# Patient Record
Sex: MALE | Race: WHITE | Employment: OTHER | RURAL
[De-identification: names, ages, dates, MRNs, and addresses within clinical notes are randomized per-mention and may not be internally consistent; named-entity substitution may affect disease eponyms.]

---

## 2017-03-09 DIAGNOSIS — M15.9 GENERALIZED OSTEOARTHRITIS OF MULTIPLE SITES: ICD-10-CM

## 2017-03-09 RX ORDER — DICLOFENAC SODIUM 75 MG/1
TABLET, DELAYED RELEASE ORAL
Qty: 60 TAB | Refills: 3 | Status: SHIPPED | OUTPATIENT
Start: 2017-03-09 | End: 2017-07-25 | Stop reason: SDUPTHER

## 2017-04-10 DIAGNOSIS — K21.9 GASTROESOPHAGEAL REFLUX DISEASE WITHOUT ESOPHAGITIS: ICD-10-CM

## 2017-04-10 RX ORDER — FAMOTIDINE 40 MG/1
40 TABLET, FILM COATED ORAL DAILY
Qty: 30 TAB | Refills: 5 | Status: ON HOLD | OUTPATIENT
Start: 2017-04-10 | End: 2017-10-11 | Stop reason: SDUPTHER

## 2017-07-25 ENCOUNTER — OFFICE VISIT (OUTPATIENT)
Dept: FAMILY MEDICINE CLINIC | Age: 72
End: 2017-07-25

## 2017-07-25 VITALS
BODY MASS INDEX: 33.13 KG/M2 | RESPIRATION RATE: 18 BRPM | HEIGHT: 68 IN | OXYGEN SATURATION: 96 % | HEART RATE: 84 BPM | WEIGHT: 218.6 LBS | DIASTOLIC BLOOD PRESSURE: 99 MMHG | SYSTOLIC BLOOD PRESSURE: 157 MMHG

## 2017-07-25 DIAGNOSIS — Z13.1 DIABETES MELLITUS SCREENING: ICD-10-CM

## 2017-07-25 DIAGNOSIS — M15.9 GENERALIZED OSTEOARTHRITIS OF MULTIPLE SITES: ICD-10-CM

## 2017-07-25 DIAGNOSIS — R06.09 DYSPNEA ON EXERTION: Primary | ICD-10-CM

## 2017-07-25 DIAGNOSIS — R03.0 ELEVATED BLOOD PRESSURE READING WITHOUT DIAGNOSIS OF HYPERTENSION: ICD-10-CM

## 2017-07-25 DIAGNOSIS — R07.89 ANTERIOR CHEST WALL PAIN: ICD-10-CM

## 2017-07-25 RX ORDER — DICLOFENAC SODIUM 75 MG/1
TABLET, DELAYED RELEASE ORAL
Qty: 60 TAB | Refills: 5 | Status: SHIPPED | OUTPATIENT
Start: 2017-07-25 | End: 2018-05-30 | Stop reason: SDUPTHER

## 2017-07-25 NOTE — MR AVS SNAPSHOT
Visit Information Date & Time Provider Department Dept. Phone Encounter #  
 7/25/2017  7:00 AM Gemini Nunez MD [facility] Primary [facility] [phone] [id number] Upcoming Health Maintenance Date Due Hepatitis C Screening 1945 DTaP/Tdap/Td series (1 - Tdap) 3/31/1967 FOBT Q 1 YEAR AGE 50-75 3/31/1996 ZOSTER VACCINE AGE 60> 1/31/2006 GLAUCOMA SCREENING Q2Y 3/31/2011 Pneumococcal 65+ Low/Medium Risk (1 of 2 - PCV13) 3/31/2011 INFLUENZA AGE 9 TO ADULT 8/1/2017 MEDICARE YEARLY EXAM 7/22/2018 Allergies as of 7/25/2017  Review Complete On: 7/25/2017 By: Gemini Nunez MD  
  
 Severity Noted Reaction Type Reactions Pcn [Penicillins]  12/23/2013    Unknown (comments) Current Immunizations  Never Reviewed No immunizations on file. Not reviewed this visit You Were Diagnosed With   
  
 Codes Comments Dyspnea on exertion    -  Primary ICD-10-CM: R06.09 
ICD-9-CM: 786.09 Generalized osteoarthritis of multiple sites     ICD-10-CM: M15.9 ICD-9-CM: 715.09 Anterior chest wall pain     ICD-10-CM: R07.89 ICD-9-CM: 786.52 Elevated blood pressure reading without diagnosis of hypertension     ICD-10-CM: R03.0 ICD-9-CM: 796.2 Diabetes mellitus screening     ICD-10-CM: Z13.1 ICD-9-CM: V77.1 Vitals BP Pulse Resp Height(growth percentile) Weight(growth percentile) SpO2  
 (!) 157/99 (BP 1 Location: Left arm, BP Patient Position: Sitting) 84 18 5' 8\" (1.727 m) 218 lb 9.6 oz (99.2 kg) 96% BMI Smoking Status 33.24 kg/m2 Former Smoker Vitals History BMI and BSA Data Body Mass Index Body Surface Area  
 33.24 kg/m 2 2.18 m 2 Preferred Pharmacy Pharmacy Name Phone [id number] [address] [place] [phone] Your Updated Medication List  
  
   
This list is accurate as of: 7/25/17  7:58 AM.  Always use your most recent med list.  
  
  
  
  
 diclofenac EC 75 mg EC tablet Commonly known as:  VOLTAREN Take twice a day  as needed for arthritis pain  
  
 doxazosin 4 mg tablet Commonly known as:  CARDURA Take  by mouth daily. famotidine 40 mg tablet Commonly known as:  PEPCID Take 1 Tab by mouth daily. finasteride 5 mg tablet Commonly known as:  PROSCAR Take 5 mg by mouth daily. Prescriptions Sent to Pharmacy Refills  
 diclofenac EC (VOLTAREN) 75 mg EC tablet 5 Sig: Take twice a day  as needed for arthritis pain  
 Class: Normal  
 Pharmacy: [facility]/pharmacy #[id number] Remingtonharshad Yeung, [address] [place] Ph #: [phone] We Performed the Following AMB POC EKG ROUTINE W/ 12 LEADS, INTER & REP [64367 CPT(R)] CBC WITH AUTOMATED DIFF [93097 CPT(R)] HEMOGLOBIN A1C WITH EAG [41554 CPT(R)] LIPID PANEL [49919 CPT(R)] METABOLIC PANEL, COMPREHENSIVE [45853 CPT(R)] PA COLLECTION VENOUS BLOOD,VENIPUNCTURE Z598595 CPT(R)] REFERRAL FOR COLONOSCOPY [HGY913 Custom] Comments:  
 Screening colonoscopy To-Do List   
 07/31/2017 Imaging:  NM CARDIAC SPECT W STRS/REST MULT Referral Information Referral ID Referred By Referred To  
  
 [id number] Yesika Rm, [address] [place] [address] Way Phone: [phone] Fax: [phone] Visits Status Start Date End Date 1 Authorized 7/25/17 7/25/18 If your referral has a status of pending review or denied, additional information will be sent to support the outcome of this decision. Introducing [facility] & HEALTH SERVICES! Deepti Lopez introduces Mindscore patient portal. Now you can access parts of your medical record, email your doctor's office, and request medication refills online. 1. In your internet browser, go to [email or web address] 2. Click on the First Time User? Click Here link in the Sign In box. You will see the New Member Sign Up page. 3. Enter your [employer] Access Code exactly as it appears below. You will not need to use this code after youve completed the sign-up process. If you do not sign up before the expiration date, you must request a new code. · [employer] Access Code: [email or web address] Expires: 10/23/2017  7:58 AM 
 
4. Enter the last four digits of your Social Security Number (xxxx) and Date of Birth (mm/dd/yyyy) as indicated and click Submit. You will be taken to the next sign-up page. 5. Create a HistoSonics ID. This will be your Histo[employer]ics login ID and cannot be changed, so think of one that is secure and easy to remember. 6. Create a [employer] password. You can change your password at any time. 7. Enter your Password Reset Question and Answer. This can be used at a later time if you forget your password. 8. Enter your e-mail address. You will receive e-mail notification when new information is available in [address] 9. Click Sign Up. You can now view and download portions of your medical record. 10. Click the Download Summary menu link to download a portable copy of your medical information. If you have questions, please visit the Frequently Asked Questions section of the [employer]ics website. Remember, [employer]oSonics is NOT to be used for urgent needs. For medical emergencies, dial 911. Now available from your iPhone and Android! Please provide this summary of care documentation to your next provider. Your primary care clinician is listed as Miah Crane. If you have any questions after today's visit, please call [phone].

## 2017-07-25 NOTE — PROGRESS NOTES
Clinton Jovel is a 71 y.o. male who presents with the following:  Chief Complaint   Patient presents with    Weight Gain       Chest Pain    The history is provided by the patient and spouse (Patient complains of dyspnea on exertion as well as squeezing chest pain when he takes one lap around his house. No palpitations. No sweating. But his wife is noted his abdomen becoming more protuberant and she was concerned about fluid. ). Associated symptoms include malaise/fatigue and shortness of breath. Pertinent negatives include no abdominal pain, no claudication, no cough, no dizziness, no fever, no headaches, no orthopnea and no palpitations. Allergies   Allergen Reactions    Pcn [Penicillins] Unknown (comments)       Current Outpatient Prescriptions   Medication Sig    diclofenac EC (VOLTAREN) 75 mg EC tablet Take twice a day  as needed for arthritis pain    famotidine (PEPCID) 40 mg tablet Take 1 Tab by mouth daily.  finasteride (PROSCAR) 5 mg tablet Take 5 mg by mouth daily.  doxazosin (CARDURA) 4 mg tablet Take  by mouth daily. No current facility-administered medications for this visit.         Past Medical History:   Diagnosis Date    BPH (benign prostatic hyperplasia) 1/5/2015    Enlarged prostate     GERD (gastroesophageal reflux disease)     Rotator cuff tear        Past Surgical History:   Procedure Laterality Date    HX CHOLECYSTECTOMY      HX HERNIA REPAIR  2001    HX MOHS PROCEDURES         Family History   Problem Relation Age of Onset    Cancer Mother     Heart Disease Father     Cancer Sister     Diabetes Brother        Social History     Social History    Marital status:      Spouse name: N/A    Number of children: N/A    Years of education: N/A     Social History Main Topics    Smoking status: Former Smoker    Smokeless tobacco: None    Alcohol use No    Drug use: None    Sexual activity: Not Asked     Other Topics Concern    None     Social History Narrative       Review of Systems   Constitutional: Positive for malaise/fatigue. Negative for chills, fever and weight loss. HENT: Negative for congestion, hearing loss, sore throat and tinnitus. Eyes: Negative for blurred vision, pain and discharge. Respiratory: Positive for shortness of breath. Negative for cough and wheezing. Cardiovascular: Positive for chest pain. Negative for palpitations, orthopnea, claudication and leg swelling. Gastrointestinal: Negative for abdominal pain, constipation and heartburn. Genitourinary: Negative for dysuria, frequency and urgency. Musculoskeletal: Negative for falls, joint pain and myalgias. Skin: Negative for itching and rash. Neurological: Negative for dizziness, tingling, tremors and headaches. Endo/Heme/Allergies: Negative for environmental allergies and polydipsia. Psychiatric/Behavioral: Negative for depression and substance abuse. The patient is not nervous/anxious. Visit Vitals    BP (!) 157/99 (BP 1 Location: Left arm, BP Patient Position: Sitting)    Pulse 84    Resp 18    Ht 5' 8\" (1.727 m)    Wt 218 lb 9.6 oz (99.2 kg)    SpO2 96%    BMI 33.24 kg/m2     Physical Exam   Constitutional: He is oriented to person, place, and time and well-developed, well-nourished, and in no distress. Obese white male with most of his weight carried in his middle. HENT:   Head: Normocephalic and atraumatic. Nose: Nose normal.   Mouth/Throat: Oropharynx is clear and moist.   Eyes: Conjunctivae and EOM are normal. Pupils are equal, round, and reactive to light. Neck: Normal range of motion. Neck supple. No JVD present. No tracheal deviation present. No thyromegaly present. Cardiovascular: Normal rate, regular rhythm, normal heart sounds and intact distal pulses. Exam reveals no gallop and no friction rub. No murmur heard. Pulmonary/Chest: Effort normal and breath sounds normal. No respiratory distress. He has no wheezes.  He has no rales. He exhibits no tenderness. Abdominal: Soft. Bowel sounds are normal. He exhibits no distension and no mass. There is no tenderness. There is no rebound and no guarding. Musculoskeletal: Normal range of motion. He exhibits no edema or tenderness. Lymphadenopathy:     He has no cervical adenopathy. Neurological: He is alert and oriented to person, place, and time. He has normal reflexes. No cranial nerve deficit. He exhibits normal muscle tone. Gait normal. Coordination normal.   Skin: Skin is warm and dry. No rash noted. No erythema. Psychiatric: Mood, memory, affect and judgment normal.   Vitals reviewed. ICD-10-CM ICD-9-CM    1. Dyspnea on exertion R06.09 786.09 diclofenac EC (VOLTAREN) 75 mg EC tablet      AMB POC EKG ROUTINE W/ 12 LEADS, INTER & REP      NM CARDIAC SPECT W STRS/REST MULT      CBC WITH AUTOMATED DIFF      METABOLIC PANEL, COMPREHENSIVE      LIPID PANEL      REFERRAL FOR COLONOSCOPY   2. Generalized osteoarthritis of multiple sites M15.9 715.09 diclofenac EC (VOLTAREN) 75 mg EC tablet      AMB POC EKG ROUTINE W/ 12 LEADS, INTER & REP      NM CARDIAC SPECT W STRS/REST MULT      CBC WITH AUTOMATED DIFF      METABOLIC PANEL, COMPREHENSIVE      LIPID PANEL      REFERRAL FOR COLONOSCOPY   3.  Anterior chest wall pain R07.89 786.52 diclofenac EC (VOLTAREN) 75 mg EC tablet      AMB POC EKG ROUTINE W/ 12 LEADS, INTER & REP      NM CARDIAC SPECT W STRS/REST MULT      CBC WITH AUTOMATED DIFF      METABOLIC PANEL, COMPREHENSIVE      LIPID PANEL      REFERRAL FOR COLONOSCOPY   4. Elevated blood pressure reading without diagnosis of hypertension R03.0 796.2        Orders Placed This Encounter    NM CARDIAC SPECT W STRS/REST MULT     Standing Status:   Future     Standing Expiration Date:   8/25/2018    CBC WITH AUTOMATED DIFF    METABOLIC PANEL, COMPREHENSIVE    LIPID PANEL    REFERRAL FOR COLONOSCOPY     Referral Priority:   Routine     Referral Type:   Consultation     Referral Reason:   Specialty Services Required     Referred to Provider:   Harry Jaramillo MD     Requested Specialty:   Surgery    AMB POC EKG ROUTINE W/ 12 LEADS, INTER & REP     Order Specific Question:   Reason for Exam:     Answer:   Elevated blood pressure with a diagnosis of hypertension, dyspnea on exertion, squeezing chest pain with exertion    diclofenac EC (VOLTAREN) 75 mg EC tablet     Sig: Take twice a day  as needed for arthritis pain     Dispense:  60 Tab     Refill:  5    I told the patient to cut out pasta rice potatoes. And to eat much less than he has been eating at supper. Patient tends to skip in the morning skip at lunch and only eats snacks at those times and then he eats everything in sight around supper and sits around and that does nothing and is just putting on weight. However I am disturbed about his chest tightness and dyspnea on exertion. His blood pressure is also up some and that will need to be rechecked.     Follow-up Disposition: Not on Pradip Brennan MD

## 2017-07-26 LAB
ALBUMIN SERPL-MCNC: 3.9 G/DL (ref 3.5–4.8)
ALBUMIN/GLOB SERPL: 1.6 {RATIO} (ref 1.2–2.2)
ALP SERPL-CCNC: 71 IU/L (ref 39–117)
ALT SERPL-CCNC: 35 IU/L (ref 0–44)
AST SERPL-CCNC: 20 IU/L (ref 0–40)
BASOPHILS # BLD AUTO: 0 X10E3/UL (ref 0–0.2)
BASOPHILS NFR BLD AUTO: 0 %
BILIRUB SERPL-MCNC: 0.4 MG/DL (ref 0–1.2)
BUN SERPL-MCNC: 18 MG/DL (ref 8–27)
BUN/CREAT SERPL: 19 (ref 10–24)
CALCIUM SERPL-MCNC: 9 MG/DL (ref 8.6–10.2)
CHLORIDE SERPL-SCNC: 100 MMOL/L (ref 96–106)
CHOLEST SERPL-MCNC: 178 MG/DL (ref 100–199)
CO2 SERPL-SCNC: 24 MMOL/L (ref 18–29)
CREAT SERPL-MCNC: 0.96 MG/DL (ref 0.76–1.27)
EOSINOPHIL # BLD AUTO: 0.2 X10E3/UL (ref 0–0.4)
EOSINOPHIL NFR BLD AUTO: 3 %
ERYTHROCYTE [DISTWIDTH] IN BLOOD BY AUTOMATED COUNT: 14.5 % (ref 12.3–15.4)
EST. AVERAGE GLUCOSE BLD GHB EST-MCNC: 111 MG/DL
GLOBULIN SER CALC-MCNC: 2.5 G/DL (ref 1.5–4.5)
GLUCOSE SERPL-MCNC: 118 MG/DL (ref 65–99)
HBA1C MFR BLD: 5.5 % (ref 4.8–5.6)
HCT VFR BLD AUTO: 40.7 % (ref 37.5–51)
HDLC SERPL-MCNC: 28 MG/DL
HGB BLD-MCNC: 13.8 G/DL (ref 12.6–17.7)
IMM GRANULOCYTES # BLD: 0 X10E3/UL (ref 0–0.1)
IMM GRANULOCYTES NFR BLD: 0 %
INTERPRETATION, 910389: NORMAL
LDLC SERPL CALC-MCNC: 112 MG/DL (ref 0–99)
LYMPHOCYTES # BLD AUTO: 1.7 X10E3/UL (ref 0.7–3.1)
LYMPHOCYTES NFR BLD AUTO: 24 %
MCH RBC QN AUTO: 27.9 PG (ref 26.6–33)
MCHC RBC AUTO-ENTMCNC: 33.9 G/DL (ref 31.5–35.7)
MCV RBC AUTO: 82 FL (ref 79–97)
MONOCYTES # BLD AUTO: 0.6 X10E3/UL (ref 0.1–0.9)
MONOCYTES NFR BLD AUTO: 9 %
NEUTROPHILS # BLD AUTO: 4.8 X10E3/UL (ref 1.4–7)
NEUTROPHILS NFR BLD AUTO: 64 %
PLATELET # BLD AUTO: 313 X10E3/UL (ref 150–379)
POTASSIUM SERPL-SCNC: 4 MMOL/L (ref 3.5–5.2)
PROT SERPL-MCNC: 6.4 G/DL (ref 6–8.5)
RBC # BLD AUTO: 4.94 X10E6/UL (ref 4.14–5.8)
SODIUM SERPL-SCNC: 141 MMOL/L (ref 134–144)
TRIGL SERPL-MCNC: 188 MG/DL (ref 0–149)
VLDLC SERPL CALC-MCNC: 38 MG/DL (ref 5–40)
WBC # BLD AUTO: 7.4 X10E3/UL (ref 3.4–10.8)

## 2017-09-13 ENCOUNTER — OFFICE VISIT (OUTPATIENT)
Dept: FAMILY MEDICINE CLINIC | Age: 72
End: 2017-09-13

## 2017-09-13 VITALS
TEMPERATURE: 97.5 F | BODY MASS INDEX: 33.25 KG/M2 | HEART RATE: 79 BPM | RESPIRATION RATE: 16 BRPM | SYSTOLIC BLOOD PRESSURE: 160 MMHG | DIASTOLIC BLOOD PRESSURE: 80 MMHG | OXYGEN SATURATION: 96 % | HEIGHT: 68 IN | WEIGHT: 219.4 LBS

## 2017-09-13 DIAGNOSIS — E78.5 HYPERLIPIDEMIA, UNSPECIFIED HYPERLIPIDEMIA TYPE: ICD-10-CM

## 2017-09-13 DIAGNOSIS — I10 ESSENTIAL HYPERTENSION: ICD-10-CM

## 2017-09-13 DIAGNOSIS — D17.1 LIPOMA OF BACK: Primary | ICD-10-CM

## 2017-09-13 RX ORDER — CIPROFLOXACIN 500 MG/1
TABLET ORAL
COMMUNITY
End: 2018-10-16 | Stop reason: ALTCHOICE

## 2017-09-13 RX ORDER — LISINOPRIL 10 MG/1
10 TABLET ORAL DAILY
Qty: 30 TAB | Refills: 2 | Status: SHIPPED | OUTPATIENT
Start: 2017-09-13 | End: 2017-10-17 | Stop reason: DRUGHIGH

## 2017-09-13 RX ORDER — ASPIRIN 81 MG/1
81 TABLET ORAL DAILY
COMMUNITY
Start: 2017-09-13 | End: 2017-12-19 | Stop reason: SINTOL

## 2017-09-13 RX ORDER — LORATADINE 10 MG/1
10 TABLET ORAL
COMMUNITY

## 2017-09-13 NOTE — MR AVS SNAPSHOT
Visit Information Date & Time Provider Department Dept. Phone Encounter #  
 9/13/2017 10:00 AM Fernando Gonzalez MD [address] [place] [address] [phone] [id number] Follow-up Instructions Return in about 1 month (around 10/13/2017). Upcoming Health Maintenance Date Due Hepatitis C Screening 1945 DTaP/Tdap/Td series (1 - Tdap) 3/31/1967 ZOSTER VACCINE AGE 60> 1/31/2006 GLAUCOMA SCREENING Q2Y 3/31/2011 Pneumococcal 65+ Low/Medium Risk (1 of 2 - PCV13) 3/31/2011 INFLUENZA AGE 9 TO ADULT 8/1/2017 FOBT Q 1 YEAR AGE 50-75 8/1/2018* MEDICARE YEARLY EXAM 7/22/2018 *Topic was postponed. The date shown is not the original due date. Allergies as of 9/13/2017  Review Complete On: 9/13/2017 By: Jonathon Gutierrez RN Severity Noted Reaction Type Reactions Pcn [Penicillins]  12/23/2013    Hives Ultram [Tramadol]  09/13/2017    Nausea and Vomiting Current Immunizations  Never Reviewed No immunizations on file. Not reviewed this visit You Were Diagnosed With   
  
 Codes Comments Lipoma of back    -  Primary ICD-10-CM: D17.1 ICD-9-CM: 214.8 Essential hypertension     ICD-10-CM: I10 
ICD-9-CM: 401.9 Hyperlipidemia, unspecified hyperlipidemia type     ICD-10-CM: E78.5 ICD-9-CM: 272.4 BMI 33.0-33.9,adult     ICD-10-CM: E22.66 
ICD-9-CM: V85.33 Vitals BP Pulse Temp Resp Height(growth percentile) Weight(growth percentile) 168/89 (BP 1 Location: Left arm, BP Patient Position: Sitting) 79 97.5 °F (36.4 °C) (Oral) 16 5' 8\" (1.727 m) 219 lb 6.4 oz (99.5 kg) SpO2 BMI Smoking Status 96% 33.36 kg/m2 Former Smoker BMI and BSA Data Body Mass Index Body Surface Area  
 33.36 kg/m 2 2.18 m 2 Preferred Pharmacy [facility] Name Phone [id number], [address] [phone] Your Updated Medication List  
  
   
 This list is accurate as of: 9/13/17 10:19 AM.  Always use your most recent med list.  
  
  
  
  
 aspirin delayed-release 81 mg tablet Take 1 Tab by mouth daily. CIPRO 500 mg tablet Generic drug:  ciprofloxacin HCl Take  by mouth two (2) times daily as needed (Low Abdomin Infections due to bladder not emptying). CLARITIN 10 mg tablet Generic drug:  loratadine Take 10 mg by mouth daily as needed for Allergies. diclofenac EC 75 mg EC tablet Commonly known as:  VOLTAREN Take twice a day  as needed for arthritis pain  
  
 doxazosin 4 mg tablet Commonly known as:  CARDURA Take  by mouth daily. famotidine 40 mg tablet Commonly known as:  PEPCID Take 1 Tab by mouth daily. finasteride 5 mg tablet Commonly known as:  PROSCAR Take 5 mg by mouth daily. lisinopril 10 mg tablet Commonly known as:  Moe Boehringer Take 1 Tab by mouth daily. Prescriptions Sent to Pharmacy Refills  
 lisinopril (PRINIVIL, ZESTRIL) 10 mg tablet 2 Sig: Take 1 Tab by mouth daily. Class: Normal  
 Pharmacy: [facility]acy #[id number] [email or web address]], [address] Ph #: [phone] Route: Oral  
  
We Performed the Following REFERRAL TO GENERAL SURGERY [REF27 Custom] Comments:  
 Patient requests Christiano Broussard surgeon in [facility] lipoma left shoulder Follow-up Instructions Return in about 1 month (around 10/13/2017). Referral Information Referral ID Referred By Referred To  
  
 [id number] Ana Reading Not Available Visits Status Start Date End Date 1 New Request 9/13/17 9/13/18 If your referral has a status of pending review or denied, additional information will be sent to support the outcome of this decision. Patient Instructions   
meds Start lisinopril one daily Add Baby aspirin daily Referral to surgery for lipoma Work on diet and exercise Learning About High Cholesterol What is high cholesterol? Cholesterol is a type of fat in your blood. It is needed for many body functions, such as making new cells. Cholesterol is made by your body. It also comes from food you eat. If you have too much cholesterol, it starts to build up in your arteries. This is called hardening of the arteries, or atherosclerosis. High cholesterol raises your risk of a heart attack and stroke. There are different types of cholesterol. LDL is the \"bad\" cholesterol. High LDL can raise your risk for heart disease, heart attack, and stroke. HDL is the \"good\" cholesterol. High HDL is linked with a lower risk for heart disease, heart attack, and stroke. Your cholesterol levels help your doctor find out your risk for having a heart attack or stroke. How can you prevent high cholesterol? A heart-healthy lifestyle can help you prevent high cholesterol. This lifestyle helps lower your risk for a heart attack and stroke. · Eat heart-healthy foods. ¨ Eat fruits, vegetables, whole grains (like oatmeal), dried beans and peas, nuts and seeds, soy products (like tofu), and fat-free or low-fat dairy products. ¨ Replace butter, margarine, and hydrogenated or partially hydrogenated oils with olive and canola oils. (Canola oil margarine without trans fat is fine.) ¨ Replace red meat with fish, poultry, and soy protein (like tofu). ¨ Limit processed and packaged foods like chips, crackers, and cookies. · Be active. Exercise can improve your cholesterol level. Get at least 30 minutes of exercise on most days of the week. Walking is a good choice. You also may want to do other activities, such as running, swimming, cycling, or playing tennis or team sports. · Stay at a healthy weight. Lose weight if you need to. · Don't smoke. If you need help quitting, talk to your doctor about stop-smoking programs and medicines. These can increase your chances of quitting for good. How is high cholesterol treated? The goal of treatment is to reduce your chances of having a heart attack or stroke. The goal is not to lower your cholesterol numbers only. · You may make lifestyle changes, such as eating healthy foods, not smoking, losing weight, and being more active. · You may have to take medicine. Follow-up care is a key part of your treatment and safety. Be sure to make and go to all appointments, and call your doctor if you are having problems. It's also a good idea to know your test results and keep a list of the medicines you take. Where can you learn more? Go to http://ling-ted.info/. Enter I851 in the search box to learn more about \"Learning About High Cholesterol. \" Current as of: April 3, 2017 Content Version: 11.3 © 4015-3001 [employer]. Care instructions adapted under license by [employer] (which disclaims liability or warranty for this information). If you have questions about a medical condition or this instruction, always ask your healthcare professional. [address] any warranty or liability for your use of this information. Learning About High Cholesterol What is high cholesterol? Cholesterol is a type of fat in your blood. It is needed for many body functions, such as making new cells. Cholesterol is made by your body. It also comes from food you eat. If you have too much cholesterol, it starts to build up in your arteries. This is called hardening of the arteries, or atherosclerosis. High cholesterol raises your risk of a heart attack and stroke. There are different types of cholesterol. LDL is the \"bad\" cholesterol. High LDL can raise your risk for heart disease, heart attack, and stroke. HDL is the \"good\" cholesterol. High HDL is linked with a lower risk for heart disease, heart attack, and stroke.  
Your cholesterol levels help your doctor find out your risk for having a heart attack or stroke. How can you prevent high cholesterol? A heart-healthy lifestyle can help you prevent high cholesterol. This lifestyle helps lower your risk for a heart attack and stroke. · Eat heart-healthy foods. ¨ Eat fruits, vegetables, whole grains (like oatmeal), dried beans and peas, nuts and seeds, soy products (like tofu), and fat-free or low-fat dairy products. ¨ Replace butter, margarine, and hydrogenated or partially hydrogenated oils with olive and canola oils. (Canola oil margarine without trans fat is fine.) ¨ Replace red meat with fish, poultry, and soy protein (like tofu). ¨ Limit processed and packaged foods like chips, crackers, and cookies. · Be active. Exercise can improve your cholesterol level. Get at least 30 minutes of exercise on most days of the week. Walking is a good choice. You also may want to do other activities, such as running, swimming, cycling, or playing tennis or team sports. · Stay at a healthy weight. Lose weight if you need to. · Don't smoke. If you need help quitting, talk to your doctor about stop-smoking programs and medicines. These can increase your chances of quitting for good. How is high cholesterol treated? The goal of treatment is to reduce your chances of having a heart attack or stroke. The goal is not to lower your cholesterol numbers only. · You may make lifestyle changes, such as eating healthy foods, not smoking, losing weight, and being more active. · You may have to take medicine. Follow-up care is a key part of your treatment and safety. Be sure to make and go to all appointments, and call your doctor if you are having problems. It's also a good idea to know your test results and keep a list of the medicines you take. Where can you learn more? Go to http://ling-ted.info/. Enter V112 in the search box to learn more about \"Learning About High Cholesterol. \" Current as of: April 3, 2017 Content Version: 11.3 © 7554-6480 Healthwise[employer] Incorporated. Care instructions adapted under license by [employer] (which disclaims liability or warranty for this information). If you have questions about a medical condition or this instruction, always ask your healthcare professional. [address] any warranty or liability for your use of this information. Introducing [facility] & HE[facility] SERVICES! Joe Medellin introduces EduRise patient portal. Now you can access parts of your medical record, email your doctor's office, and request medication refills online. 1. In your internet browser, go to [email or web address] 2. Click on the First Time User? Click Here link in the Sign In box. You will see the New Member Sign Up page. 3. Enter your EduRise Access Code exactly as it appears below. You will not need to use this code after youve completed the sign-up process. If you do not sign up before the expiration date, you must request a new code. · EduRise Access Code: [email or web address] Expires: 10/23/2017  7:58 AM 
 
4. Enter the last four digits of your Social Security Number (xxxx) and Date of Birth (mm/dd/yyyy) as indicated and click Submit. You will be taken to the next sign-up page. 5. Create a EduRise ID. This will be your EduRise login ID and cannot be changed, so think of one that is secure and easy to remember. 6. Create a EduRise password. You can change your password at any time. 7. Enter your Password Reset Question and Answer. This can be used at a later time if you forget your password. 8. Enter your e-mail address. You will receive e-mail notification when new information is available in [address] 9. Click Sign Up. You can now view and download portions of your medical record. 10. Click the Download Summary menu link to download a portable copy of your medical information.  
 
If you have questions, please visit the Frequently Asked Questions section of the Plusmo. Remember, [employer]-ID[employer] is NOT to be used for urgent needs. For medical emergencies, dial 911. Now available from your iPhone and Android! Please provide this summary of care documentation to your next provider. Your primary care clinician is listed as Richard Mahan. If you have any questions after today's visit, please call [phone].

## 2017-09-13 NOTE — PATIENT INSTRUCTIONS
meds  Start lisinopril one daily  Add Baby aspirin daily    Referral to surgery for lipoma    Work on diet and exercise     Learning About High Cholesterol  What is high cholesterol? Cholesterol is a type of fat in your blood. It is needed for many body functions, such as making new cells. Cholesterol is made by your body. It also comes from food you eat. If you have too much cholesterol, it starts to build up in your arteries. This is called hardening of the arteries, or atherosclerosis. High cholesterol raises your risk of a heart attack and stroke. There are different types of cholesterol. LDL is the \"bad\" cholesterol. High LDL can raise your risk for heart disease, heart attack, and stroke. HDL is the \"good\" cholesterol. High HDL is linked with a lower risk for heart disease, heart attack, and stroke. Your cholesterol levels help your doctor find out your risk for having a heart attack or stroke. How can you prevent high cholesterol? A heart-healthy lifestyle can help you prevent high cholesterol. This lifestyle helps lower your risk for a heart attack and stroke. · Eat heart-healthy foods. ¨ Eat fruits, vegetables, whole grains (like oatmeal), dried beans and peas, nuts and seeds, soy products (like tofu), and fat-free or low-fat dairy products. ¨ Replace butter, margarine, and hydrogenated or partially hydrogenated oils with olive and canola oils. (Canola oil margarine without trans fat is fine.)  ¨ Replace red meat with fish, poultry, and soy protein (like tofu). ¨ Limit processed and packaged foods like chips, crackers, and cookies. · Be active. Exercise can improve your cholesterol level. Get at least 30 minutes of exercise on most days of the week. Walking is a good choice. You also may want to do other activities, such as running, swimming, cycling, or playing tennis or team sports. · Stay at a healthy weight. Lose weight if you need to. · Don't smoke.  If you need help quitting, talk to your doctor about stop-smoking programs and medicines. These can increase your chances of quitting for good. How is high cholesterol treated? The goal of treatment is to reduce your chances of having a heart attack or stroke. The goal is not to lower your cholesterol numbers only. · You may make lifestyle changes, such as eating healthy foods, not smoking, losing weight, and being more active. · You may have to take medicine. Follow-up care is a key part of your treatment and safety. Be sure to make and go to all appointments, and call your doctor if you are having problems. It's also a good idea to know your test results and keep a list of the medicines you take. Where can you learn more? Go to http://ling-ted.info/. Enter H337 in the search box to learn more about \"Learning About High Cholesterol. \"  Current as of: April 3, 2017  Content Version: 11.3  © 0415-0946 [employer]. Care instructions adapted under license by [employer] (which disclaims liability or warranty for this information). If you have questions about a medical condition or this instruction, always ask your healthcare professional. [place] [address] any warranty or liability for your use of this information. Learning About High Cholesterol  What is high cholesterol? Cholesterol is a type of fat in your blood. It is needed for many body functions, such as making new cells. Cholesterol is made by your body. It also comes from food you eat. If you have too much cholesterol, it starts to build up in your arteries. This is called hardening of the arteries, or atherosclerosis. High cholesterol raises your risk of a heart attack and stroke. There are different types of cholesterol. LDL is the \"bad\" cholesterol. High LDL can raise your risk for heart disease, heart attack, and stroke. HDL is the \"good\" cholesterol.  High HDL is linked with a lower risk for heart disease, heart attack, and stroke. Your cholesterol levels help your doctor find out your risk for having a heart attack or stroke. How can you prevent high cholesterol? A heart-healthy lifestyle can help you prevent high cholesterol. This lifestyle helps lower your risk for a heart attack and stroke. · Eat heart-healthy foods. ¨ Eat fruits, vegetables, whole grains (like oatmeal), dried beans and peas, nuts and seeds, soy products (like tofu), and fat-free or low-fat dairy products. ¨ Replace butter, margarine, and hydrogenated or partially hydrogenated oils with olive and canola oils. (Canola oil margarine without trans fat is fine.)  ¨ Replace red meat with fish, poultry, and soy protein (like tofu). ¨ Limit processed and packaged foods like chips, crackers, and cookies. · Be active. Exercise can improve your cholesterol level. Get at least 30 minutes of exercise on most days of the week. Walking is a good choice. You also may want to do other activities, such as running, swimming, cycling, or playing tennis or team sports. · Stay at a healthy weight. Lose weight if you need to. · Don't smoke. If you need help quitting, talk to your doctor about stop-smoking programs and medicines. These can increase your chances of quitting for good. How is high cholesterol treated? The goal of treatment is to reduce your chances of having a heart attack or stroke. The goal is not to lower your cholesterol numbers only. · You may make lifestyle changes, such as eating healthy foods, not smoking, losing weight, and being more active. · You may have to take medicine. Follow-up care is a key part of your treatment and safety. Be sure to make and go to all appointments, and call your doctor if you are having problems. It's also a good idea to know your test results and keep a list of the medicines you take. Where can you learn more? Go to http://ling-ted.info/.   Enter U167 in the search box to learn more about \"Learning About High Cholesterol. \"  Current as of: April 3, 2017  Content Version: 11.3  © 4819-6035 [employer]. Care instructions adapted under license by [employer] (which disclaims liability or warranty for this information). If you have questions about a medical condition or this instruction, always ask your healthcare professional. [place] [address] any warranty or liability for your use of this information.

## 2017-09-13 NOTE — PROGRESS NOTES
Roger Hudson is a 71 y.o. male who presents to the office today with the following:  Chief Complaint   Patient presents with    Cyst     has a knot on his back going up into his neck       Allergies   Allergen Reactions    Pcn [Penicillins] Hives    Ultram [Tramadol] Nausea and Vomiting       Current Outpatient Prescriptions   Medication Sig    ciprofloxacin HCl (CIPRO) 500 mg tablet Take  by mouth two (2) times daily as needed (Low Abdomin Infections due to bladder not emptying).  loratadine (CLARITIN) 10 mg tablet Take 10 mg by mouth daily as needed for Allergies.  lisinopril (PRINIVIL, ZESTRIL) 10 mg tablet Take 1 Tab by mouth daily.  aspirin delayed-release 81 mg tablet Take 1 Tab by mouth daily.  diclofenac EC (VOLTAREN) 75 mg EC tablet Take twice a day  as needed for arthritis pain    famotidine (PEPCID) 40 mg tablet Take 1 Tab by mouth daily.  finasteride (PROSCAR) 5 mg tablet Take 5 mg by mouth daily.  doxazosin (CARDURA) 4 mg tablet Take  by mouth daily. No current facility-administered medications for this visit. Past Medical History:   Diagnosis Date    BPH (benign prostatic hyperplasia) 1/5/2015    Enlarged prostate     GERD (gastroesophageal reflux disease)     Rotator cuff tear        Past Surgical History:   Procedure Laterality Date    HX CHOLECYSTECTOMY      HX HERNIA REPAIR  2001    HX MOHS PROCEDURES         History   Smoking Status    Former Smoker   Smokeless Tobacco    Never Used       Family History   Problem Relation Age of Onset    Cancer Mother     Heart Disease Father     Cancer Sister     Diabetes Brother          History of Present Illness:  Patient here for evaluation lipoma and to establish care in this office    Patient with a long history of a soft tissue mass left upper back shoulder blade area. He had this removed a number of years ago. He was told it was a benign fatty cysts. It has regrown and continues to grow.   Is now causing him some discomfort in the area and he is interested in having this removed again. Review patient charts indicates he does have a history of BPH. He does follow with urology on a regular basis. He is on Proscar and Cardura. He does have Cipro to take as needed urinary infections. He tells me he is up-to-date with his urologist.    Patient's blood pressure elevated in the office today. Review of his records show it is been elevated 5 out of his last 6 visits to various clinics. I feel he does have hypertension. He would be willing to be treated for this. Recent EKG 7/17 normal.  Chemistry panel normal.  He denies any chest pain or palpitations. He would be willing to start blood pressure medication and a daily aspirin    Lipid panel in July borderline with an LDL of 112. This is too high given his other cardiac risk factors. I have instructed him in strict low-cholesterol diet. We will be repeating this in the future and if it remains elevated we will need to consider adding a medication for his lipids. He was in to see another provider over the summer complaining of some shortness of breath. It was felt most likely due to his obesity. Has noted EKG was normal.  He is referred for a stress test.  Patient canceled the appointment. He  does not want to go for cardiac stress testing. He is aware of the indication. He wants to lose weight and see how he feels. Patient does have a history of GERD. Currently on Pepcid. Asymptomatic. He does have history of DJD. He takes Voltaren on a as needed basis. Patient is not interested in any colon screening. He is aware the indication.     Patient declines any and all immunizations          Review of Systems:    Review of systems negative except as noted above      Physical Exam:  Visit Vitals    /80    Pulse 79    Temp 97.5 °F (36.4 °C) (Oral)    Resp 16    Ht 5' 8\" (1.727 m)    Wt 219 lb 6.4 oz (99.5 kg)    SpO2 96%    BMI 33.36 kg/m2     Vitals:    09/13/17 0952 09/13/17 1020   BP: 168/89 160/80   BP 1 Location: Left arm    BP Patient Position: Sitting    Pulse: 79    Resp: 16    Temp: 97.5 °F (36.4 °C)    TempSrc: Oral    SpO2: 96%    Weight: 219 lb 6.4 oz (99.5 kg)    Height: 5' 8\" (1.727 m)      Patient no acute distress. Vital signs repeated and as above  Head was normocephalic  Neck no nodes or masses no bruits  Chest was clear no wheezes rhonchi or rales  Cor regular rate and rhythm no S3 no S4 no murmurs  Abdomen obese no obvious masses soft nontender  Left upper back patient had a large soft tissue mass most consistent with a lipoma  Extremities had no edema    Assessment/Plan:  1. Lipoma of back  Enlarging lipoma upper back. Referring patient to surgery for further evaluation recommendations  - REFERRAL TO GENERAL SURGERY    2. Essential hypertension  Starting lisinopril and baby aspirin daily for his hypertension. Patient scheduled for follow-up in 1 month for recheck  - lisinopril (PRINIVIL, ZESTRIL) 10 mg tablet; Take 1 Tab by mouth daily. Dispense: 30 Tab; Refill: 2  - aspirin delayed-release 81 mg tablet; Take 1 Tab by mouth daily. 3. Hyperlipidemia, unspecified hyperlipidemia type  Patient given information on low-cholesterol diet. Will be ordering follow-up lab work in the future    4. BMI 33.0-33.9,adult  Encouraged to work on his diet and exercise    Patient Instructions   meds  Start lisinopril one daily  Add Baby aspirin daily    Referral to surgery for lipoma    Work on diet and exercise     Learning About High Cholesterol  What is high cholesterol? Cholesterol is a type of fat in your blood. It is needed for many body functions, such as making new cells. Cholesterol is made by your body. It also comes from food you eat. If you have too much cholesterol, it starts to build up in your arteries. This is called hardening of the arteries, or atherosclerosis.  High cholesterol raises your risk of a heart attack and stroke. There are different types of cholesterol. LDL is the \"bad\" cholesterol. High LDL can raise your risk for heart disease, heart attack, and stroke. HDL is the \"good\" cholesterol. High HDL is linked with a lower risk for heart disease, heart attack, and stroke. Your cholesterol levels help your doctor find out your risk for having a heart attack or stroke. How can you prevent high cholesterol? A heart-healthy lifestyle can help you prevent high cholesterol. This lifestyle helps lower your risk for a heart attack and stroke. · Eat heart-healthy foods. ¨ Eat fruits, vegetables, whole grains (like oatmeal), dried beans and peas, nuts and seeds, soy products (like tofu), and fat-free or low-fat dairy products. ¨ Replace butter, margarine, and hydrogenated or partially hydrogenated oils with olive and canola oils. (Canola oil margarine without trans fat is fine.)  ¨ Replace red meat with fish, poultry, and soy protein (like tofu). ¨ Limit processed and packaged foods like chips, crackers, and cookies. · Be active. Exercise can improve your cholesterol level. Get at least 30 minutes of exercise on most days of the week. Walking is a good choice. You also may want to do other activities, such as running, swimming, cycling, or playing tennis or team sports. · Stay at a healthy weight. Lose weight if you need to. · Don't smoke. If you need help quitting, talk to your doctor about stop-smoking programs and medicines. These can increase your chances of quitting for good. How is high cholesterol treated? The goal of treatment is to reduce your chances of having a heart attack or stroke. The goal is not to lower your cholesterol numbers only. · You may make lifestyle changes, such as eating healthy foods, not smoking, losing weight, and being more active. · You may have to take medicine. Follow-up care is a key part of your treatment and safety.  Be sure to make and go to all appointments, and call your doctor if you are having problems. It's also a good idea to know your test results and keep a list of the medicines you take. Where can you learn more? Go to http://ling-ted.info/. Enter L407 in the search box to learn more about \"Learning About High Cholesterol. \"  Current as of: April 3, 2017  Content Version: 11.3  © 6775-0495 [employer]. Care instructions adapted under license by [employer] (which disclaims liability or warranty for this information). If you have questions about a medical condition or this instruction, always ask your healthcare professional. [place] [address] any warranty or liability for your use of this information. Learning About High Cholesterol  What is high cholesterol? Cholesterol is a type of fat in your blood. It is needed for many body functions, such as making new cells. Cholesterol is made by your body. It also comes from food you eat. If you have too much cholesterol, it starts to build up in your arteries. This is called hardening of the arteries, or atherosclerosis. High cholesterol raises your risk of a heart attack and stroke. There are different types of cholesterol. LDL is the \"bad\" cholesterol. High LDL can raise your risk for heart disease, heart attack, and stroke. HDL is the \"good\" cholesterol. High HDL is linked with a lower risk for heart disease, heart attack, and stroke. Your cholesterol levels help your doctor find out your risk for having a heart attack or stroke. How can you prevent high cholesterol? A heart-healthy lifestyle can help you prevent high cholesterol. This lifestyle helps lower your risk for a heart attack and stroke. · Eat heart-healthy foods. ¨ Eat fruits, vegetables, whole grains (like oatmeal), dried beans and peas, nuts and seeds, soy products (like tofu), and fat-free or low-fat dairy products.   ¨ Replace butter, margarine, and hydrogenated or partially hydrogenated oils with olive and canola oils. (Canola oil margarine without trans fat is fine.)  ¨ Replace red meat with fish, poultry, and soy protein (like tofu). ¨ Limit processed and packaged foods like chips, crackers, and cookies. · Be active. Exercise can improve your cholesterol level. Get at least 30 minutes of exercise on most days of the week. Walking is a good choice. You also may want to do other activities, such as running, swimming, cycling, or playing tennis or team sports. · Stay at a healthy weight. Lose weight if you need to. · Don't smoke. If you need help quitting, talk to your doctor about stop-smoking programs and medicines. These can increase your chances of quitting for good. How is high cholesterol treated? The goal of treatment is to reduce your chances of having a heart attack or stroke. The goal is not to lower your cholesterol numbers only. · You may make lifestyle changes, such as eating healthy foods, not smoking, losing weight, and being more active. · You may have to take medicine. Follow-up care is a key part of your treatment and safety. Be sure to make and go to all appointments, and call your doctor if you are having problems. It's also a good idea to know your test results and keep a list of the medicines you take. Where can you learn more? Go to http://ling-ted.info/. Enter H521 in the search box to learn more about \"Learning About High Cholesterol. \"  Current as of: April 3, 2017  Content Version: 11.3  © 3480-4013 [employer]. Care instructions adapted under license by [employer] (which disclaims liability or warranty for this information). If you have questions about a medical condition or this instruction, always ask your healthcare professional. [place] [address] any warranty or liability for your use of this information. Continue current therapy plan except for indicated above.  Verbal and written instructions (see AVS) provided.  Patient expresses understanding of diagnosis and treatment plan. Follow-up Disposition:  Return in about 1 month (around 10/13/2017). Francine Harper MD

## 2017-09-26 ENCOUNTER — OFFICE VISIT (OUTPATIENT)
Dept: SURGERY | Age: 72
End: 2017-09-26

## 2017-09-26 VITALS
HEART RATE: 75 BPM | DIASTOLIC BLOOD PRESSURE: 91 MMHG | WEIGHT: 219 LBS | SYSTOLIC BLOOD PRESSURE: 157 MMHG | BODY MASS INDEX: 33.19 KG/M2 | HEIGHT: 68 IN | OXYGEN SATURATION: 100 % | RESPIRATION RATE: 24 BRPM

## 2017-09-26 DIAGNOSIS — D17.1 LIPOMA OF BACK: Primary | ICD-10-CM

## 2017-09-26 NOTE — PROGRESS NOTES
Surgery Consult:  lipoma  Requesting physician:  Dr. Radha Vigil    Subjective:   Patient 71 y.o.  male s/p excision of left posterior shoulder lipoma 12 years ago presents with recurrent lump at the same spot. Patient noticed it 2 years ago and it's increasing in size. Patient also complains of pain especially when lean against it. Denies any history of infection in this location. No skin changes overlying the lump. Patient would like to have it excised. Past Medical & Surgical History:  Past Medical History:   Diagnosis Date    Arthritis     BPH (benign prostatic hyperplasia) 1/5/2015    Enlarged prostate     GERD (gastroesophageal reflux disease)     Rotator cuff tear       Past Surgical History:   Procedure Laterality Date    HX CHOLECYSTECTOMY      HX HERNIA REPAIR  2001    HX MOHS PROCEDURES      HX ORTHOPAEDIC  2015    Knee replacement and shoulder       Social History:  Social History     Social History    Marital status:      Spouse name: N/A    Number of children: N/A    Years of education: N/A     Occupational History    Not on file. Social History Main Topics    Smoking status: Former Smoker    Smokeless tobacco: Never Used    Alcohol use No    Drug use: No    Sexual activity: Not on file     Other Topics Concern    Not on file     Social History Narrative        Family History:  Family History   Problem Relation Age of Onset   [facility] Cancer Mother     Heart Disease Father     Cancer Sister     Diabetes Brother         Medications:  Current Outpatient Prescriptions   Medication Sig    ciprofloxacin HCl (CIPRO) 500 mg tablet Take  by mouth two (2) times daily as needed (Low Abdomin Infections due to bladder not emptying).  loratadine (CLARITIN) 10 mg tablet Take 10 mg by mouth daily as needed for Allergies.  lisinopril (PRINIVIL, ZESTRIL) 10 mg tablet Take 1 Tab by mouth daily.  aspirin delayed-release 81 mg tablet Take 1 Tab by mouth daily.     diclofenac EC (VOLTAREN) 75 mg EC tablet Take twice a day  as needed for arthritis pain    famotidine (PEPCID) 40 mg tablet Take 1 Tab by mouth daily.  finasteride (PROSCAR) 5 mg tablet Take 5 mg by mouth daily.  doxazosin (CARDURA) 4 mg tablet Take  by mouth daily. No current facility-administered medications for this visit. Allergies: Allergies   Allergen Reactions    Pcn [Penicillins] Hives    Ultram [Tramadol] Nausea and Vomiting       Review of Systems  A comprehensive review of systems was negative except for that written in the HPI. Objective:     Exam:    Visit Vitals    BP (!) 157/91    Pulse 75    Resp 24    Ht 5' 8\" (1.727 m)    Wt 99.3 kg (219 lb)    SpO2 100%    BMI 33.3 kg/m2     General appearance: alert, cooperative, no distress, appears stated age  Lungs: clear to auscultation bilaterally  Heart: regular rate and rhythm  Extremities: extremities normal, atraumatic, no cyanosis or edema. JONO. Skin: Skin color, texture, turgor normal.   Left posterior shoulder with 6 x 7 cm rubbery mobile non-tender mass. No erythema. No skin changes overlying the mass except for well healed incision over it. Neurologic: Grossly normal      Assessment:     Recurrent left posterior shoulder lipoma    Plan:     Excision of recurrent left posterior shoulder lipoma  Risks, benefit, and alternative to surgery was discussed with the patient. The risks of surgery include but not limited to infection, bleeding, recurrence, and the risks of anesthetic. Patient is agreeable to surgery. All questions answered.

## 2017-09-26 NOTE — MR AVS SNAPSHOT
Visit Information Date & Time Provider Department Dept. Phone Encounter #  
 9/26/2017 10:00 AM Etelvina Gooden MD [facility] [address] [id number] Your Appointments 10/17/2017 10:50 AM  
Any with Celsa Argueta MD  
[address] [place] [address] [address] [facility]) Appt Note: 1 mo f/u,CP$0/9.13.2017  
 [address] [facility] [address]  
[phone]  
  
   
 [address]  
  
    
 10/24/2017 10:00 AM  
ESTABLISHED PATIENT with Etelvina Gooden MD  
[facility] ([address]) Appt Note: Excision left shoulder lipoma 10/10/17  
 [address] [place] [address], [address] [facility], [address] [phone]  
[address] [facility], [address] [facility], [address] [facility] [address] [address] Upcoming Health Maintenance Date Due Hepatitis C Screening 1945 GLAUCOMA SCREENING Q2Y 3/31/2011 FOBT Q 1 YEAR AGE 50-75 8/1/2018* MEDICARE YEARLY EXAM 7/22/2018 Pneumococcal 65+ Low/Medium Risk (2 of 2 - PPSV23) 9/13/2018 DTaP/Tdap/Td series (2 - Td) 9/13/2027 *Topic was postponed. The date shown is not the original due date. Allergies as of 9/26/2017  Review Complete On: 9/26/2017 By: Etelvina Gooden MD  
  
 Severity Noted Reaction Type Reactions Pcn [Penicillins]  12/23/2013    Hives Ultram [Tramadol]  09/13/2017    Nausea and Vomiting Current Immunizations  Never Reviewed No immunizations on file. Not reviewed this visit You Were Diagnosed With   
  
 Codes Comments Lipoma of back    -  Primary ICD-10-CM: D17.1 ICD-9-CM: 214.8 Vitals BP Pulse Resp Height(growth percentile) Weight(growth percentile) SpO2  
 (!) 157/91 75 24 5' 8\" (1.727 m) 219 lb (99.3 kg) 100% BMI Smoking Status 33.3 kg/m2 Former Smoker BMI and BSA Data  Body Mass Index Body Surface Area  
 33.3 kg/m 2 2.18 m 2  
 Preferred Pharmacy Pharmacy Name Phone [id number] [place] [phone] Your Updated Medication List  
  
   
This list is accurate as of: 9/26/17 11:57 AM.  Always use your most recent med list.  
  
  
  
  
 aspirin delayed-release 81 mg tablet Take 1 Tab by mouth daily. CIPRO 500 mg tablet Generic drug:  ciprofloxacin HCl Take  by mouth two (2) times daily as needed (Low Abdomin Infections due to bladder not emptying). CLARITIN 10 mg tablet Generic drug:  loratadine Take 10 mg by mouth daily as needed for Allergies. diclofenac EC 75 mg EC tablet Commonly known as:  VOLTAREN Take twice a day  as needed for arthritis pain  
  
 doxazosin 4 mg tablet Commonly known as:  CARDURA Take  by mouth daily. famotidine 40 mg tablet Commonly known as:  PEPCID Take 1 Tab by mouth daily. finasteride 5 mg tablet Commonly known as:  PROSCAR Take 5 mg by mouth daily. lisinopril 10 mg tablet Commonly known as:  Lupe Bills Take 1 Tab by mouth daily. Introducing [facility]! [employer] introduces ZOGO[employer] patient portal. Now you can access parts of your medical record, email your doctor's office, and request medication refills online. 1. In your internet browser, go to [email or web address] 2. Click on the First Time User? Click Here link in the Sign In box. You will see the New Member Sign Up page. 3. Enter your ZOGOtennis Access Code exactly as it appears below. You will not need to use this code after youve completed the sign-up process. If you do not sign up before the expiration date, you must request a new code. · ZOGO[employer]nis Access Code: [email or web address] Expires: 10/23/2017  7:58 AM 
 
4. Enter the last four digits of your Social Security Number (xxxx) and Date of Birth (mm/dd/yyyy) as indicated and click Submit.  You will be taken to the next sign-up page. 5. Create a InfoDif ID. This will be your InfoDif login ID and cannot be changed, so think of one that is secure and easy to remember. 6. Create a InfoDif password. You can change your password at any time. 7. Enter your Password Reset Question and Answer. This can be used at a later time if you forget your password. 8. Enter your e-mail address. You will receive e-mail notification when new information is available in [address]. 9. Click Sign Up. You can now view and download portions of your medical record. 10. Click the Download Summary menu link to download a portable copy of your medical information. If you have questions, please visit the Frequently Asked Questions section of the InfoDif website. Remember, InfoDif is NOT to be used for urgent needs. For medical emergencies, dial 911. Now available from your iPhone and Android! Please provide this summary of care documentation to your next provider. Your primary care clinician is listed as Whitney Rosen. If you have any questions after today's visit, please call [phone].

## 2017-10-05 NOTE — PERIOP NOTES
[facility]  Preoperative Instructions        Surgery Date 10/10/17          Time of Arrival 1030    1. On the day of your surgery, please report to the Surgical Services Registration Desk and sign in at your designated time. The Surgery Center is located to the right of the Emergency Room. 2. You must have someone with you to drive you home. You should not drive a car for 24 hours following surgery. Please make arrangements for a friend or family member to stay with you for the first 24 hours after your surgery. 3. Do not have anything to eat or drink (including water, gum, mints, coffee, juice) after midnight ?10/09/17? Hazel Lemuel ? This may not apply to medications prescribed by your physician. ?(Please note below the special instructions with medications to take the morning of your procedure.)    4. We recommend you do not drink any alcoholic beverages for 24 hours before and after your surgery. 5. Contact your surgeons office for instructions on the following medications: non-steroidal anti-inflammatory drugs (i.e. Advil, Aleve), vitamins, and supplements. (Some surgeons will want you to stop these medications prior to surgery and others may allow you to take them)  **If you are currently taking Plavix, Coumadin, Aspirin and/or other blood-thinning agents, contact your surgeon for instructions. ** Your surgeon will partner with the physician prescribing these medications to determine if it is safe to stop or if you need to continue taking. Please do not stop taking these medications without instructions from your surgeon    6. Wear comfortable clothes. Wear glasses instead of contacts. Do not bring any money or jewelry. Please bring picture ID, insurance card, and any prearranged co-payment or hospital payment. Do not wear make-up, particularly mascara the morning of your surgery. Do not wear nail polish, particularly if you are having foot /hand surgery.   Wear your hair loose or down, no ponytails, buns, farheen pins or clips. All body piercings must be removed. Please shower with antibacterial soap for three consecutive days before and on the morning of surgery, but do not apply any lotions, powders or deodorants after the shower on the day of surgery. Please use a fresh towels after each shower. Please sleep in clean clothes and change bed linens the night before surgery. Please do not shave for 48 hours prior to surgery. Shaving of the face is acceptable. 7. You should understand that if you do not follow these instructions your surgery may be cancelled. If your physical condition changes (I.e. fever, cold or flu) please contact your surgeon as soon as possible. 8. It is important that you be on time. If a situation occurs where you may be late, please call [phone] (OR Holding Area). 9. If you have any questions and or problems, please call [phone] (Pre-admission Testing). 10. Your surgery time may be subject to change. You will receive a phone call the evening prior if your time changes. 11.  If having outpatient surgery, you must have someone to drive you here, stay with you during the duration of your stay, and to drive you home at time of discharge. 12.   In an effort to improve the efficiency, privacy, and safety for all of our Pre-op patients visitors are not allowed in the Holding area. Once you arrive and are registered your family/visitors will be asked to remain in the waiting room. The Pre-op staff will get you from the Surgical Waiting Area and will explain to you and your family/visitors that the Pre-op phase is beginning. The staff will answer any questions and provide instructions for tracking of the patient, by use of the existing tracking number and color-coded status board in the waiting room.   At this time the staff will also ask for your designated spokesperson information in the event that the physician or staff need to provide an update or obtain any pertinent information. The designated spokesperson will be notified if the physician needs to speak to family during the pre-operative phase. If at any time your family/visitors has questions or concerns they may approach the volunteer desk in the waiting area for assistance. Special Instructions:    MEDICATIONS TO TAKE THE MORNING OF SURGERY WITH A SIP OF WATER:cardura      I understand a pre-operative phone call will be made to verify my surgery time. In the event that I am not available, I give permission for a message to be left on my answering service and/or with another person?   [phone] yes     ___________________      __________   _________    (Signature of Patient)             (Witness)                (Date and Time)

## 2017-10-10 ENCOUNTER — ANESTHESIA (OUTPATIENT)
Dept: SURGERY | Age: 72
End: 2017-10-10
Payer: COMMERCIAL

## 2017-10-10 ENCOUNTER — HOSPITAL ENCOUNTER (OUTPATIENT)
Age: 72
Setting detail: OUTPATIENT SURGERY
Discharge: HOME | End: 2017-10-10
Attending: SURGERY | Admitting: SURGERY
Payer: COMMERCIAL

## 2017-10-10 ENCOUNTER — ANESTHESIA EVENT (OUTPATIENT)
Dept: SURGERY | Age: 72
End: 2017-10-10
Payer: COMMERCIAL

## 2017-10-10 VITALS
SYSTOLIC BLOOD PRESSURE: 133 MMHG | HEIGHT: 68 IN | TEMPERATURE: 97.7 F | DIASTOLIC BLOOD PRESSURE: 72 MMHG | OXYGEN SATURATION: 96 % | WEIGHT: 218.26 LBS | RESPIRATION RATE: 17 BRPM | BODY MASS INDEX: 33.08 KG/M2 | HEART RATE: 62 BPM

## 2017-10-10 LAB
GLUCOSE BLD STRIP.AUTO-MCNC: 111 MG/DL (ref 65–100)
SERVICE CMNT-IMP: ABNORMAL

## 2017-10-10 PROCEDURE — 77030018836 HC SOL IRR NACL ICUM -A: Performed by: SURGERY

## 2017-10-10 PROCEDURE — 74011000250 HC RX REV CODE- 250

## 2017-10-10 PROCEDURE — 76210000006 HC OR PH I REC 0.5 TO 1 HR: Performed by: SURGERY

## 2017-10-10 PROCEDURE — 76210000021 HC REC RM PH II 0.5 TO 1 HR: Performed by: SURGERY

## 2017-10-10 PROCEDURE — 77030032490 HC SLV COMPR SCD KNE COVD -B: Performed by: SURGERY

## 2017-10-10 PROCEDURE — 77030010507 HC ADH SKN DERMBND J&J -B: Performed by: SURGERY

## 2017-10-10 PROCEDURE — 88377 M/PHMTRC ALYS ISHQUANT/SEMIQ: CPT | Performed by: SURGERY

## 2017-10-10 PROCEDURE — 74011250636 HC RX REV CODE- 250/636

## 2017-10-10 PROCEDURE — 77030011640 HC PAD GRND REM COVD -A: Performed by: SURGERY

## 2017-10-10 PROCEDURE — 76060000033 HC ANESTHESIA 1 TO 1.5 HR: Performed by: SURGERY

## 2017-10-10 PROCEDURE — 77030013079 HC BLNKT BAIR HGGR 3M -A: Performed by: NURSE ANESTHETIST, CERTIFIED REGISTERED

## 2017-10-10 PROCEDURE — 74011000250 HC RX REV CODE- 250: Performed by: SURGERY

## 2017-10-10 PROCEDURE — 76010000149 HC OR TIME 1 TO 1.5 HR: Performed by: SURGERY

## 2017-10-10 PROCEDURE — 82962 GLUCOSE BLOOD TEST: CPT

## 2017-10-10 PROCEDURE — 74011250636 HC RX REV CODE- 250/636: Performed by: ANESTHESIOLOGY

## 2017-10-10 PROCEDURE — 77030031139 HC SUT VCRL2 J&J -A: Performed by: SURGERY

## 2017-10-10 PROCEDURE — 77030003029 HC SUT VCRL J&J -B: Performed by: SURGERY

## 2017-10-10 PROCEDURE — 88307 TISSUE EXAM BY PATHOLOGIST: CPT | Performed by: SURGERY

## 2017-10-10 RX ORDER — MIDAZOLAM HYDROCHLORIDE 1 MG/ML
1 INJECTION, SOLUTION INTRAMUSCULAR; INTRAVENOUS AS NEEDED
Status: DISCONTINUED | OUTPATIENT
Start: 2017-10-10 | End: 2017-10-10 | Stop reason: HOSPADM

## 2017-10-10 RX ORDER — FENTANYL CITRATE 50 UG/ML
25 INJECTION, SOLUTION INTRAMUSCULAR; INTRAVENOUS
Status: DISCONTINUED | OUTPATIENT
Start: 2017-10-10 | End: 2017-10-10 | Stop reason: HOSPADM

## 2017-10-10 RX ORDER — SODIUM CHLORIDE, SODIUM LACTATE, POTASSIUM CHLORIDE, CALCIUM CHLORIDE 600; 310; 30; 20 MG/100ML; MG/100ML; MG/100ML; MG/100ML
25 INJECTION, SOLUTION INTRAVENOUS CONTINUOUS
Status: DISCONTINUED | OUTPATIENT
Start: 2017-10-10 | End: 2017-10-10 | Stop reason: HOSPADM

## 2017-10-10 RX ORDER — CLINDAMYCIN PHOSPHATE 900 MG/50ML
INJECTION INTRAVENOUS AS NEEDED
Status: DISCONTINUED | OUTPATIENT
Start: 2017-10-10 | End: 2017-10-10 | Stop reason: HOSPADM

## 2017-10-10 RX ORDER — PROPOFOL 10 MG/ML
INJECTION, EMULSION INTRAVENOUS AS NEEDED
Status: DISCONTINUED | OUTPATIENT
Start: 2017-10-10 | End: 2017-10-10 | Stop reason: HOSPADM

## 2017-10-10 RX ORDER — SODIUM CHLORIDE, SODIUM LACTATE, POTASSIUM CHLORIDE, CALCIUM CHLORIDE 600; 310; 30; 20 MG/100ML; MG/100ML; MG/100ML; MG/100ML
25 INJECTION, SOLUTION INTRAVENOUS CONTINUOUS
Status: DISCONTINUED | OUTPATIENT
Start: 2017-10-10 | End: 2017-10-12 | Stop reason: HOSPADM

## 2017-10-10 RX ORDER — LIDOCAINE HYDROCHLORIDE 20 MG/ML
INJECTION, SOLUTION EPIDURAL; INFILTRATION; INTRACAUDAL; PERINEURAL AS NEEDED
Status: DISCONTINUED | OUTPATIENT
Start: 2017-10-10 | End: 2017-10-10 | Stop reason: HOSPADM

## 2017-10-10 RX ORDER — FENTANYL CITRATE 50 UG/ML
50 INJECTION, SOLUTION INTRAMUSCULAR; INTRAVENOUS AS NEEDED
Status: DISCONTINUED | OUTPATIENT
Start: 2017-10-10 | End: 2017-10-10 | Stop reason: HOSPADM

## 2017-10-10 RX ORDER — LIDOCAINE HYDROCHLORIDE 10 MG/ML
0.1 INJECTION, SOLUTION EPIDURAL; INFILTRATION; INTRACAUDAL; PERINEURAL AS NEEDED
Status: DISCONTINUED | OUTPATIENT
Start: 2017-10-10 | End: 2017-10-10 | Stop reason: HOSPADM

## 2017-10-10 RX ORDER — PHENYLEPHRINE HCL IN 0.9% NACL 0.4MG/10ML
SYRINGE (ML) INTRAVENOUS AS NEEDED
Status: DISCONTINUED | OUTPATIENT
Start: 2017-10-10 | End: 2017-10-10 | Stop reason: HOSPADM

## 2017-10-10 RX ORDER — HYDROMORPHONE HYDROCHLORIDE 1 MG/ML
.2-.5 INJECTION, SOLUTION INTRAMUSCULAR; INTRAVENOUS; SUBCUTANEOUS
Status: DISCONTINUED | OUTPATIENT
Start: 2017-10-10 | End: 2017-10-10 | Stop reason: HOSPADM

## 2017-10-10 RX ORDER — ONDANSETRON 2 MG/ML
4 INJECTION INTRAMUSCULAR; INTRAVENOUS AS NEEDED
Status: DISCONTINUED | OUTPATIENT
Start: 2017-10-10 | End: 2017-10-10 | Stop reason: HOSPADM

## 2017-10-10 RX ORDER — ONDANSETRON 2 MG/ML
INJECTION INTRAMUSCULAR; INTRAVENOUS AS NEEDED
Status: DISCONTINUED | OUTPATIENT
Start: 2017-10-10 | End: 2017-10-10 | Stop reason: HOSPADM

## 2017-10-10 RX ORDER — FENTANYL CITRATE 50 UG/ML
INJECTION, SOLUTION INTRAMUSCULAR; INTRAVENOUS AS NEEDED
Status: DISCONTINUED | OUTPATIENT
Start: 2017-10-10 | End: 2017-10-10 | Stop reason: HOSPADM

## 2017-10-10 RX ORDER — HYDROCODONE BITARTRATE AND ACETAMINOPHEN 5; 325 MG/1; MG/1
1-2 TABLET ORAL
Qty: 30 TAB | Refills: 0 | Status: SHIPPED | OUTPATIENT
Start: 2017-10-10 | End: 2017-10-24

## 2017-10-10 RX ORDER — DIPHENHYDRAMINE HYDROCHLORIDE 50 MG/ML
12.5 INJECTION, SOLUTION INTRAMUSCULAR; INTRAVENOUS AS NEEDED
Status: DISCONTINUED | OUTPATIENT
Start: 2017-10-10 | End: 2017-10-10 | Stop reason: HOSPADM

## 2017-10-10 RX ORDER — MIDAZOLAM HYDROCHLORIDE 1 MG/ML
INJECTION, SOLUTION INTRAMUSCULAR; INTRAVENOUS AS NEEDED
Status: DISCONTINUED | OUTPATIENT
Start: 2017-10-10 | End: 2017-10-10 | Stop reason: HOSPADM

## 2017-10-10 RX ORDER — BUPIVACAINE HYDROCHLORIDE AND EPINEPHRINE 2.5; 5 MG/ML; UG/ML
INJECTION, SOLUTION EPIDURAL; INFILTRATION; INTRACAUDAL; PERINEURAL AS NEEDED
Status: DISCONTINUED | OUTPATIENT
Start: 2017-10-10 | End: 2017-10-10 | Stop reason: HOSPADM

## 2017-10-10 RX ORDER — PROPOFOL 10 MG/ML
INJECTION, EMULSION INTRAVENOUS
Status: DISCONTINUED | OUTPATIENT
Start: 2017-10-10 | End: 2017-10-10 | Stop reason: HOSPADM

## 2017-10-10 RX ORDER — DOCUSATE SODIUM 100 MG/1
100 CAPSULE, LIQUID FILLED ORAL 2 TIMES DAILY
Qty: 20 CAP | Refills: 0 | Status: SHIPPED | OUTPATIENT
Start: 2017-10-10 | End: 2018-11-19

## 2017-10-10 RX ADMIN — Medication 80 MCG: at 13:15

## 2017-10-10 RX ADMIN — PROPOFOL 50 MG: 10 INJECTION, EMULSION INTRAVENOUS at 12:26

## 2017-10-10 RX ADMIN — Medication 80 MCG: at 12:59

## 2017-10-10 RX ADMIN — FENTANYL CITRATE 25 MCG: 50 INJECTION, SOLUTION INTRAMUSCULAR; INTRAVENOUS at 12:42

## 2017-10-10 RX ADMIN — Medication 40 MCG: at 12:45

## 2017-10-10 RX ADMIN — Medication 80 MCG: at 13:04

## 2017-10-10 RX ADMIN — Medication 80 MCG: at 12:51

## 2017-10-10 RX ADMIN — FENTANYL CITRATE 25 MCG: 50 INJECTION, SOLUTION INTRAMUSCULAR; INTRAVENOUS at 12:25

## 2017-10-10 RX ADMIN — ONDANSETRON 4 MG: 2 INJECTION INTRAMUSCULAR; INTRAVENOUS at 13:06

## 2017-10-10 RX ADMIN — CLINDAMYCIN PHOSPHATE 900 MG: 900 INJECTION INTRAVENOUS at 12:42

## 2017-10-10 RX ADMIN — SODIUM CHLORIDE, SODIUM LACTATE, POTASSIUM CHLORIDE, AND CALCIUM CHLORIDE 25 ML/HR: 600; 310; 30; 20 INJECTION, SOLUTION INTRAVENOUS at 10:59

## 2017-10-10 RX ADMIN — PROPOFOL 120 MCG/KG/MIN: 10 INJECTION, EMULSION INTRAVENOUS at 12:25

## 2017-10-10 RX ADMIN — LIDOCAINE HYDROCHLORIDE 40 MG: 20 INJECTION, SOLUTION EPIDURAL; INFILTRATION; INTRACAUDAL; PERINEURAL at 12:25

## 2017-10-10 RX ADMIN — MIDAZOLAM HYDROCHLORIDE 2 MG: 1 INJECTION, SOLUTION INTRAMUSCULAR; INTRAVENOUS at 12:17

## 2017-10-10 RX ADMIN — Medication 120 MCG: at 13:12

## 2017-10-10 NOTE — PERIOP NOTES
Patient discharged to home. Discharge teaching conducted with the patient and his wife, and included follow up appointments, diet and activity recommendations, surgical site care, and medication education. Medication education provided on pericolace and Norco.  Medication education included dosage, timing, safety, and potential side effects. The patient's wife was made aware by this nurse that the patient was wheezing upon exertion; she reiterated that this was baseline for the patient, she stated \"He get short of breath. He needs to lose weight. \"  The patient's IV was removed; pressure held for approximately three minutes until hemostasis achieved. The patient and his wife verbalized understanding of discharge teaching provided and had no further questions or concerns at the time of discharge.

## 2017-10-10 NOTE — PERIOP NOTES
56 Pt's wife states pt's shoulder doctor, Dr Osborn Russian informed pt and wife to inform Dr Aiyana Pitt concern regarding eleventh cranial nerve involvement. Will inform Dr Aiyana Pitt.  36 Dr Aiyana Pitt notified pt's information regarding Dr Rashard Julian concern regarding 11th cranial nerve. States ok, should not be a problem. 1141 Report to Yen Sandhu RN per SBAR for lunch relief.

## 2017-10-10 NOTE — ANESTHESIA POSTPROCEDURE EVALUATION
Post-Anesthesia Evaluation and Assessment    Patient: Amaury Bravo MRN: [id number]  SSN: [id number]    YOB: 1945  Age: 71 y.o. Sex: male       Cardiovascular Function/Vital Signs  Visit Vitals    /85    Pulse 67    Temp 36.6 °C (97.9 °F)    Resp 17    Ht 5' 8\" (1.727 m)    Wt 99 kg (218 lb 4.1 oz)    SpO2 94%    BMI 33.19 kg/m2       Patient is status post total IV anesthesia, MAC anesthesia for Procedure(s):  EXCISION LEFT POSTERIOR SHOULDER LIPOMA. Nausea/Vomiting: None    Postoperative hydration reviewed and adequate. Pain:  Pain Scale 1: Numeric (0 - 10) (10/10/17 1350)  Pain Intensity 1: 0 (10/10/17 1350)   Managed    Neurological Status:   Neuro (WDL): Exceptions to WDL (10/10/17 1328)  Neuro  Neurologic State: Drowsy (10/10/17 1328)  Orientation Level: Oriented X4 (10/10/17 1328)  Cognition: Follows commands (10/10/17 1328)  Speech: Clear (10/10/17 1328)  LUE Motor Response: Purposeful (10/10/17 1328)  LLE Motor Response: Purposeful (10/10/17 1328)  RUE Motor Response: Purposeful (10/10/17 1328)  RLE Motor Response: Purposeful (10/10/17 1328)   At baseline    Mental Status and Level of Consciousness: Arousable    Pulmonary Status:   O2 Device: Room air (10/10/17 1350)   Adequate oxygenation and airway patent    Complications related to anesthesia: None    Post-anesthesia assessment completed.  No concerns    Signed By: Shauna Chu MD     October 10, 2017

## 2017-10-10 NOTE — DISCHARGE INSTRUCTIONS
Patient Discharge Instructions    Amaury Lawrence / [id number] : 1945    Admitted 10/10/2017 Discharged: 10/10/2017         What to do at Home    Recommended diet: Regular Diet    Recommended activity: no strenuous exercises x 2 weeks; no driving while taking narcotics for pain. May take shower, but no bath x 2 weeks. Keep ice over the incision to minimize swelling. If you experience a lot of drainage, develop redness around the wound, or a fever over 101 F occurs please call the office. Narcotics and anesthesia sometimes cause nausea and vomiting. If persistent please call the office. Do not hesitate to call with questions or concerns. Follow-up with Dr. Eulalio Vann in 2 weeks. Please call [phone] for an appointment. Information obtained by :  I understand that if any problems occur once I am at home I am to contact my physician. I understand and acknowledge receipt of the instructions indicated above. Physician's or R.N.'s Signature                                                                  Date/Time                                                                                                                                              Patient or Representative Signature                                                          Date/Time                How to Care for Your Wound After Its Treated With  DERMABOND* Topical Skin Adhesive  DERMABOND* Topical Skin Adhesive (2-octyl cyanoacrylate) is a sterile, liquid skin adhesive  that holds wound edges together. The film will usually remain in place for 5 to 10 days, then  naturally fall off your skin.   The following will answer some of your questions and provide instructions for proper care for your  wound while it is healing:    CHECK WOUND APPEARANCE   Some swelling, redness, and pain are common with all wounds and normally will go away as the  wound heals. If swelling, redness, or pain increases or if the wound feels warm to the touch,  contact a doctor. Also contact a doctor if the wound edges reopen or separate. REPLACE BANDAGES   If your wound is bandaged, keep the bandage dry.  Replace the dressing daily until the adhesive film has fallen off or if the  bandage should become wet, unless otherwise instructed by your  physician.  When changing the dressing, do not place tape directly over the  DERMABOND adhesive film, because removing the tape later may also  remove the film. AVOID TOPICAL MEDICATIONS   Do not apply liquid or ointment medications or any other product to your wound while the  DERMABOND adhesive film is in place. These may loosen the film before your wound is healed. KEEP WOUND DRY AND PROTECTED   You may occasionally and briefly wet your wound in the shower or bath. Do not soak or scrub  your wound, do not swim, and avoid periods of heavy perspiration until the DERMABOND  adhesive has naturally fallen off. After showering or bathing, gently blot your wound dry with a  soft towel. If a protective dressing is being used, apply a fresh, dry bandage, being sure to keep  the tape off the DERMABOND adhesive film.  Apply a clean, dry bandage over the wound if necessary to protect it.  Protect your wound from injury until the skin has had sufficient time to heal.   Do not scratch, rub, or pick at the DERMABOND adhesive film. This may loosen the film before  your wound is healed.  Protect the wound from prolonged exposure to sunlight or tanning lamps while the film is in  place. If you have any questions or concerns about this product, please consult your doctor. *Trademark ©[employer] Naty Garcia common side effect of anesthesia following surgery is nausea and/or vomiting.  In order to decrease symptoms, it is wise to avoid foods that are high in fat, greasy foods, milk products, and spicy foods for the first 24 hours. Acceptable foods for the first 24 hours following surgery include but are not limited to:     soup   broth    toast    crackers    applesauce    bananas    mashed potatoes,   soft or scrambled eggs   oatmeal    jello    It is important to eat when taking your pain medication. This will help to prevent nausea. If possible, please try to time your meals with your medications. It is very important to stay hydrated following surgery. Sip fluids frequently while awake. Avoid acidic drinks such as citrus juices and soda for 24 hours. Carbonated beverages may cause bloating and gas. Acceptable fluids include:    - water (flavor packets may add variety)  - coffee or tea (in moderation)  - Gatorade  - Daniel-aid  - apple juice  - cranberry juice    You are encouraged to cough and deep breathe every hour when awake. This will help to prevent respiratory complications following anesthesia. You may want to hug a pillow when coughing and sneezing to add additional support to the surgical area and to decrease discomfort if you had abdominal or chest surgery. If you are discharged home with support stockings, you may remove them after 24 hours. Support stockings are used to help prevent blood clots in the legs following surgery. Please take time to review all of your Home Care Instructions and Medication Information sheets provided in your discharge packet. If you have any questions, please contact your surgeons office. Thank you. Hydrocodone/Acetaminophen (By mouth)   Acetaminophen (j-srew-t-MIN-oh-fen), Hydrocodone Bitartrate (fep-zbjb-BAS-done bye-TAR-trate)  Treats pain. This medicine contains a narcotic pain reliever.    Brand Name(s): Hycet, Lorcet, Lorcet HD, Lorcet Plus, Lortab 10/325, Lortab 5/325, Lortab 7.5/325, Lortab Elixir, Norco, Verdrocet, Vicodin, Vicodin ES, Vicodin HP, Xodol, Xodol 5/300   There may be other brand names for this medicine. When This Medicine Should Not Be Used: This medicine is not right for everyone. Do not use it if you had an allergic reaction to acetaminophen, hydrocodone, or other narcotic medicines, or stomach or bowel blockage (including paralytic ileus). How to Use This Medicine:   Capsule, Liquid, Tablet  · Your doctor will tell you how much medicine to use. Do not use more than directed. · An overdose can be dangerous. Follow directions carefully so you do not get too much medicine at one time. · Oral liquid: Measure the oral liquid medicine with a marked measuring spoon, oral syringe, or medicine cup. · Drink plenty of liquids to help avoid constipation. · This medicine should come with a Medication Guide. Ask your pharmacist for a copy if you do not have one. · Missed dose: Take a dose as soon as you remember. If it is almost time for your next dose, wait until then and take a regular dose. Do not take extra medicine to make up for a missed dose. · Store the medicine in a closed container at room temperature, away from heat, moisture, and direct light. Flush any unused Norco® tablets down the toilet. Drugs and Foods to Avoid:   Ask your doctor or pharmacist before using any other medicine, including over-the-counter medicines, vitamins, and herbal products. · Do not use this medicine if you are using or have used an MAO inhibitor within the past 14 days. · Some medicines can affect how hydrocodone/acetaminophen works. Tell your doctor if you are using any of the following:   ¨ Carbamazepine, erythromycin, ketoconazole, mirtazapine, phenytoin, rifampin, ritonavir, tramadol, trazodone  ¨ Diuretic (water pill)  ¨ Medicine to treat depression or mental health problems  ¨ Medicine to treat migraine headaches  ¨ Phenothiazine medicine  · Tell your doctor if you use anything else that makes you sleepy. Some examples are allergy medicine, narcotic pain medicine, and alcohol.  Tell your doctor if you are using buprenorphine, butorphanol, nalbuphine, pentazocine, or a muscle relaxer. · Do not drink alcohol while you are using this medicine. Acetaminophen can damage your liver, and your risk is higher if you also drink alcohol. Warnings While Using This Medicine:   · Tell your doctor if you are pregnant or breastfeeding, or if you have kidney disease, liver disease, lung or breathing problems, gallbladder or pancreas problems, an underactive thyroid, Roger disease, prostate problems, trouble urinating, stomach problems, or a history of head injury or brain tumor, seizures, alcohol or drug addiction. · This medicine may cause the following problems:   ¨ High risk of overdose, which can lead to death  ¨ Respiratory depression (serious breathing problem that can be life-threatening)  ¨ Liver problems  ¨ Serious skin reactions  ¨ Serotonin syndrome (when used with certain medicines)  · This medicine can be habit-forming. Do not use more than your prescribed dose. Call your doctor if you think your medicine is not working. · This medicine may make you dizzy or drowsy. Do not drive or doing anything else that could be dangerous until you know how this medicine affects you. · This medicine contains acetaminophen. Read the labels of all other medicines you are using to see if they also contain acetaminophen, or ask your doctor or pharmacist. Raffy Pizarro not use more than 4 grams (4,000 milligrams) total of acetaminophen in one day. · Tell any doctor or dentist who treats you that you are using this medicine. This medicine may affect certain medical test results. · This medicine may cause constipation, especially with long-term use. Ask your doctor if you should use a laxative to prevent and treat constipation. · This medicine could cause infertility. Talk with your doctor before using this medicine if you plan to have children. · Keep all medicine out of the reach of children.  Never share your medicine with anyone. Possible Side Effects While Using This Medicine:   Call your doctor right away if you notice any of these side effects:  · Allergic reaction: Itching or hives, swelling in your face or hands, swelling or tingling in your mouth or throat, chest tightness, trouble breathing  · Anxiety, restlessness, fast heartbeat, fever, sweating, muscle spasms, twitching, diarrhea, seeing or hearing things that are not there  · Blistering, peeling, red skin rash  · Blue lips, fingernails, or skin  · Dark urine or pale stools, loss of appetite, nausea or vomiting, stomach pain, yellow skin or eyes  · Extreme weakness, shallow breathing, slow heartbeat, sweating, seizures, cold or clammy skin  · Lightheadedness, dizziness, fainting  If you notice these less serious side effects, talk with your doctor:   · Constipation, nausea, vomiting  · Tiredness or sleepiness  If you notice other side effects that you think are caused by this medicine, tell your doctor. Call your doctor for medical advice about side effects. You may report side effects to FDA at 7-902-FDA-3662  © 2017 [facility][note 3] Information is for End User's use only and may not be sold, redistributed or otherwise used for commercial purposes. The above information is an  only. It is not intended as medical advice for individual conditions or treatments. Talk to your doctor, nurse or pharmacist before following any medical regimen to see if it is safe and effective for you.

## 2017-10-10 NOTE — PERIOP NOTES
1328-Handoff Report from Operating Room to PACU    Report received from [facility] and Bandar Howe CRNA regarding [address]. Surgeon(s):  Inna Sanchez MD  And Procedure(s) (LRB):  EXCISION LEFT POSTERIOR SHOULDER LIPOMA (Left)  confirmed   with allergies and dressings discussed. Anesthesia type, drugs, patient history, complications, estimated blood loss, vital signs, intake and output, and last pain medication, lines and temperature were reviewed. 1400-TRANSFER - OUT REPORT:    Verbal report given to Debi ODOM(name) on [address]  being transferred to phase II(unit) for routine post - op       Report consisted of patients Situation, Background, Assessment and   Recommendations(SBAR). Information from the following report(s) SBAR, Kardex, OR Summary, Procedure Summary, Intake/Output, MAR and Recent Results was reviewed with the receiving nurse. Opportunity for questions and clarification was provided.       Patient transported with:   Registered Nurse

## 2017-10-10 NOTE — OP NOTES
Patient ID:   Name: Jose Miguel Holbrook Record Number: [id number]   YOB: 1945    Date of Surgery: 10/10/2017     Preoperative Diagnosis:   Recurrent left posterior shoulder lipoma    Postoperative Diagnosis: Same as preoperative diagnosis. Procedures:  Excision of recurrent left posterior shoulder lipoma    Surgeon: Kaylee Moran MD     Anesthesia: MAC    Estimated Blood Loss:  2 cc    Specimens:   ID Type Source Tests Collected by Time Destination   1 : Left Posterior Shoulder Lipoma Preservative Shoulder  Kaylee Moran MD 10/10/2017 1312 Pathology        Indications:   Patient 71 y.o.  male s/p excision of left posterior shoulder lipoma 12 years ago presents with recurrent lump at the same spot. Patient noticed it 2 years ago and it's increasing in size. Patient presents today for excision. Procedure Details: The patient was seen in the Holding Room. The risks, benefits, complications, treatment options, and expected outcomes were discussed with the patient. After informed consent was performed, patient was taken to the operating room and was placed lateral decubitus position in the operating table. After establishing MAC anesthesia, left posterior shoulder was prepped and draped in standard surgical fashion. A 7.5 cm transverse incision was made over the palpable mass in the left posterior shoulder and the multiloculated fatty tumor was excised using electrocautery. Specimen was sent off to the pathology. Wound was irrigated and good hemostasis was obtained. Incision was then closed in two layers. Subcutaneous layer was approximated using interrupted 3-0 Vicryl and skin was closed with 4-0 Vicryl subcuticular stitch. Dermabond was then applied. Instrument, sponge, and needle counts were correct prior to closure and at the conclusion of the case. The patient was taken to recovery room in good condition having tolerated the procedure well.       CC: Aissatou Whyte Torri Maxwell MD

## 2017-10-10 NOTE — IP AVS SNAPSHOT
[address] [facility] 
[phone] Patient: Noreen Leyva MRN: [id number] QRK:[id number] You are allergic to the following Allergen Reactions Pcn (Penicillins) Hives Ultram (Tramadol) Nausea and Vomiting Recent Documentation Height Weight BMI Smoking Status 1.727 m 99 kg 33.19 kg/m2 Former Smoker Emergency Contacts Name Discharge Info Relation Home Work Mobile Nicole Lagos DISCHARGE CAREGIVER [3] Spouse [3] [phone] About your hospitalization You were admitted on:  October 10, 2017 You last received care in the:  [facility] PACU You were discharged on:  October 10, 2017 Unit phone number:  [phone] Why you were hospitalized Your primary diagnosis was:  Not on File Providers Seen During Your Hospitalizations Provider Role Specialty Primary office phone Harish Power MD Attending Provider General Surgery [phone] Your Primary Care Physician (PCP) Primary Care Physician Office Phone Office Fax Tania Carrion [phone] Follow-up Information Follow up With Details Comments Contact Info Sean Ambriz MD   [address] [facility] [address] 
[phone] Your Appointments Tuesday October 17, 2017 10:50 AM EDT Any with Sean Ambriz MD  
[address] [place] [address]) [facility] [address]  
[phone] Tuesday October 24, 2017 10:00 AM EDT  
ESTABLISHED PATIENT with Harish Power MD  
[facility] Dr. Nik Soriano [facility] ([facility]) [address] [place] [address], [address] [address] [facility]  
[phone] Current Discharge Medication List  
  
START taking these medications Dose & Instructions Dispensing Information Comments Morning Noon Evening Bedtime  
 docusate sodium 100 mg capsule Commonly known as:  Edd Foote Your last dose was: Your next dose is:    
   
   
 Dose:  100 mg Take 1 Cap by mouth two (2) times a day. Quantity:  20 Cap Refills:  0 HYDROcodone-acetaminophen 5-325 mg per tablet Commonly known as:  Prabhjot Cortez Your last dose was: Your next dose is:    
   
   
 Dose:  1-2 Tab Take 1-2 Tabs by mouth every four (4) hours as needed for Pain. Max Daily Amount: 12 Tabs. Quantity:  30 Tab Refills:  0 CONTINUE these medications which have CHANGED Dose & Instructions Dispensing Information Comments Morning Noon Evening Bedtime  
 famotidine 40 mg tablet Commonly known as:  PEPCID What changed:   
- when to take this 
- reasons to take this Your last dose was: Your next dose is:    
   
   
 Dose:  40 mg Take 1 Tab by mouth daily. Quantity:  30 Tab Refills:  5 CONTINUE these medications which have NOT CHANGED Dose & Instructions Dispensing Information Comments Morning Noon Evening Bedtime  
 aspirin delayed-release 81 mg tablet Your last dose was: Your next dose is:    
   
   
 Dose:  81 mg Take 1 Tab by mouth daily. Refills:  0  
     
   
   
   
  
 CIPRO 500 mg tablet Generic drug:  ciprofloxacin HCl Your last dose was: Your next dose is: Take  by mouth two (2) times daily as needed (Low Abdomin Infections due to bladder not emptying). Refills:  0 CLARITIN 10 mg tablet Generic drug:  loratadine Your last dose was: Your next dose is:    
   
   
 Dose:  10 mg Take 10 mg by mouth daily as needed for Allergies. Refills:  0  
     
   
   
   
  
 diclofenac EC 75 mg EC tablet Commonly known as:  VOLTAREN Your last dose was: Your next dose is: Take twice a day  as needed for arthritis pain Quantity:  60 Tab Refills:  5  
     
   
   
   
  
 doxazosin 4 mg tablet Commonly known as:  CARDURA Your last dose was: Your next dose is: Take  by mouth daily. Refills:  0  
     
   
   
   
  
 finasteride 5 mg tablet Commonly known as:  PROSCAR Your last dose was: Your next dose is:    
   
   
 Dose:  5 mg Take 5 mg by mouth daily. Refills:  0  
     
   
   
   
  
 lisinopril 10 mg tablet Commonly known as:  Oglesby Escort Your last dose was: Your next dose is:    
   
   
 Dose:  10 mg Take 1 Tab by mouth daily. Quantity:  30 Tab Refills:  2 Where to Get Your Medications Information on where to get these meds will be given to you by the nurse or doctor. ! Ask your nurse or doctor about these medications  
  docusate sodium 100 mg capsule HYDROcodone-acetaminophen 5-325 mg per tablet Discharge Instructions Patient Discharge Instructions Angely Duque / [id number] : 1945 Admitted 10/10/2017 Discharged: 10/10/2017 What to do at [facility] Recommended diet: Regular Diet Recommended activity: no strenuous exercises x 2 weeks; no driving while taking narcotics for pain. May take shower, but no bath x 2 weeks. Keep ice over the incision to minimize swelling. If you experience a lot of drainage, develop redness around the wound, or a fever over 101 F occurs please call the office. Narcotics and anesthesia sometimes cause nausea and vomiting. If persistent please call the office. Do not hesitate to call with questions or concerns. Follow-up with Dr. Darren Figueroa in 2 weeks. Please call [phone] for an appointment. Information obtained by : 
I understand that if any problems occur once I am at home I am to contact my physician. I understand and acknowledge receipt of the instructions indicated above. Physician's or R.N.'s Signature                                                                  Date/Time Patient or Representative Signature                                                          Date/Time How to Care for Your Wound After Its Treated With DERMABOND* Topical Skin Adhesive DERMABOND* Topical Skin Adhesive (2-octyl cyanoacrylate) is a sterile, liquid skin adhesive 
that holds wound edges together. The film will usually remain in place for 5 to 10 days, then 
naturally fall off your skin. The following will answer some of your questions and provide instructions for proper care for your 
wound while it is healing: CHECK WOUND APPEARANCE 
 Some swelling, redness, and pain are common with all wounds and normally will go away as the 
wound heals. If swelling, redness, or pain increases or if the wound feels warm to the touch, 
contact a doctor. Also contact a doctor if the wound edges reopen or separate. REPLACE BANDAGES 
 If your wound is bandaged, keep the bandage dry.  Replace the dressing daily until the adhesive film has fallen off or if the 
bandage should become wet, unless otherwise instructed by your 
physician.  When changing the dressing, do not place tape directly over the DERMABOND adhesive film, because removing the tape later may also 
remove the film. AVOID TOPICAL MEDICATIONS  Do not apply liquid or ointment medications or any other product to your wound while the DERMABOND adhesive film is in place. These may loosen the film before your wound is healed. KEEP WOUND DRY AND PROTECTED 
  You may occasionally and briefly wet your wound in the shower or bath. Do not soak or scrub 
your wound, do not swim, and avoid periods of heavy perspiration until the DERMABOND 
adhesive has naturally fallen off. After showering or bathing, gently blot your wound dry with a 
soft towel. If a protective dressing is being used, apply a fresh, dry bandage, being sure to keep 
the tape off the DERMABOND adhesive film.  Apply a clean, dry bandage over the wound if necessary to protect it.  Protect your wound from injury until the skin has had sufficient time to heal. 
 Do not scratch, rub, or pick at the DERMABOND adhesive film. This may loosen the film before 
your wound is healed.  Protect the wound from prolonged exposure to sunlight or tanning lamps while the film is in 
place. If you have any questions or concerns about this product, please consult your doctor. *Trademark ©[employer] Paty Merino common side effect of anesthesia following surgery is nausea and/or vomiting. In order to decrease symptoms, it is wise to avoid foods that are high in fat, greasy foods, milk products, and spicy foods for the first 24 hours. Acceptable foods for the first 24 hours following surgery include but are not limited to: 
 
? soup 
? broth 
?  toast  
? crackers ? applesauce 
? bananas  
? mashed potatoes, 
? soft or scrambled eggs 
? oatmeal 
?  jello It is important to eat when taking your pain medication. This will help to prevent nausea. If possible, please try to time your meals with your medications. It is very important to stay hydrated following surgery. Sip fluids frequently while awake. Avoid acidic drinks such as citrus juices and soda for 24 hours. Carbonated beverages may cause bloating and gas. Acceptable fluids include: 
 
? water (flavor packets may add variety) ? coffee or tea (in moderation) ? Gatorade ? Nilay Spindle ? apple juice 
? cranberry juice You are encouraged to cough and deep breathe every hour when awake. This will help to prevent respiratory complications following anesthesia. You may want to hug a pillow when coughing and sneezing to add additional support to the surgical area and to decrease discomfort if you had abdominal or chest surgery. If you are discharged home with support stockings, you may remove them after 24 hours. Support stockings are used to help prevent blood clots in the legs following surgery. Please take time to review all of your Home Care Instructions and Medication Information sheets provided in your discharge packet. If you have any questions, please contact your surgeons office. Thank you. Hydrocodone/Acetaminophen (By mouth) Acetaminophen (v-ltpx-r-MIN-oh-fen), Hydrocodone Bitartrate (riu-ivap-EQO-done bye-TAR-trate) Treats pain. This medicine contains a narcotic pain reliever. Brand Name(s): Hycet, Lorcet, Lorcet HD, Lorcet Plus, Lortab 10/325, Lortab 5/325, Lortab 7.5/325, Lortab Elixir, Norco, Verdrocet, Vicodin, Vicodin ES, Vicodin HP, Xodol, Xodol 5/300 There may be other brand names for this medicine. When This Medicine Should Not Be Used: This medicine is not right for everyone. Do not use it if you had an allergic reaction to acetaminophen, hydrocodone, or other narcotic medicines, or stomach or bowel blockage (including paralytic ileus). How to Use This Medicine:  
Capsule, Liquid, Tablet · Your doctor will tell you how much medicine to use. Do not use more than directed. · An overdose can be dangerous. Follow directions carefully so you do not get too much medicine at one time. · Oral liquid: Measure the oral liquid medicine with a marked measuring spoon, oral syringe, or medicine cup. · Drink plenty of liquids to help avoid constipation. · This medicine should come with a Medication Guide. Ask your pharmacist for a copy if you do not have one. · Missed dose: Take a dose as soon as you remember. If it is almost time for your next dose, wait until then and take a regular dose. Do not take extra medicine to make up for a missed dose. · Store the medicine in a closed container at room temperature, away from heat, moisture, and direct light. Flush any unused Norco® tablets down the toilet. Drugs and Foods to Avoid: Ask your doctor or pharmacist before using any other medicine, including over-the-counter medicines, vitamins, and herbal products. · Do not use this medicine if you are using or have used an MAO inhibitor within the past 14 days. · Some medicines can affect how hydrocodone/acetaminophen works. Tell your doctor if you are using any of the following: ¨ Carbamazepine, erythromycin, ketoconazole, mirtazapine, phenytoin, rifampin, ritonavir, tramadol, trazodone ¨ Diuretic (water pill) ¨ Medicine to treat depression or mental health problems ¨ Medicine to treat migraine headaches ¨ Phenothiazine medicine · Tell your doctor if you use anything else that makes you sleepy. Some examples are allergy medicine, narcotic pain medicine, and alcohol. Tell your doctor if you are using buprenorphine, butorphanol, nalbuphine, pentazocine, or a muscle relaxer. · Do not drink alcohol while you are using this medicine. Acetaminophen can damage your liver, and your risk is higher if you also drink alcohol. Warnings While Using This Medicine: · Tell your doctor if you are pregnant or breastfeeding, or if you have kidney disease, liver disease, lung or breathing problems, gallbladder or pancreas problems, an underactive thyroid, [place] disease, prostate problems, trouble urinating, stomach problems, or a history of head injury or brain tumor, seizures, alcohol or drug addiction. · This medicine may cause the following problems:  
¨ High risk of overdose, which can lead to death ¨ Respiratory depression (serious breathing problem that can be life-threatening) ¨ Liver problems ¨ Serious skin reactions ¨ Serotonin syndrome (when used with certain medicines) · This medicine can be habit-forming. Do not use more than your prescribed dose. Call your doctor if you think your medicine is not working. · This medicine may make you dizzy or drowsy. Do not drive or doing anything else that could be dangerous until you know how this medicine affects you. · This medicine contains acetaminophen. Read the labels of all other medicines you are using to see if they also contain acetaminophen, or ask your doctor or pharmacist. Nadeen Martin not use more than 4 grams (4,000 milligrams) total of acetaminophen in one day. · Tell any doctor or dentist who treats you that you are using this medicine. This medicine may affect certain medical test results. · This medicine may cause constipation, especially with long-term use. Ask your doctor if you should use a laxative to prevent and treat constipation. · This medicine could cause infertility. Talk with your doctor before using this medicine if you plan to have children. · Keep all medicine out of the reach of children. Never share your medicine with anyone. Possible Side Effects While Using This Medicine:  
Call your doctor right away if you notice any of these side effects: · Allergic reaction: Itching or hives, swelling in your face or hands, swelling or tingling in your mouth or throat, chest tightness, trouble breathing · Anxiety, restlessness, fast heartbeat, fever, sweating, muscle spasms, twitching, diarrhea, seeing or hearing things that are not there · Blistering, peeling, red skin rash · Blue lips, fingernails, or skin · Dark urine or pale stools, loss of appetite, nausea or vomiting, stomach pain, yellow skin or eyes · Extreme weakness, shallow breathing, slow heartbeat, sweating, seizures, cold or clammy skin · Lightheadedness, dizziness, fainting If you notice these less serious side effects, talk with your doctor: · Constipation, nausea, vomiting · Tiredness or sleepiness If you notice other side effects that you think are caused by this medicine, tell your doctor. Call your doctor for medical advice about side effects. You may report side effects to FDA at 3-485-FDA-4797 © 2017 [address] Information is for End User's use only and may not be sold, redistributed or otherwise used for commercial purposes. The above information is an  only. It is not intended as medical advice for individual conditions or treatments. Talk to your doctor, nurse or pharmacist before following any medical regimen to see if it is safe and effective for you. Discharge Orders None ACO Transitions of Care Introducing [employer] [address] Wendy Motta offers a voluntary care coordination program to provide high quality service and care to [facility] fee-for-service beneficiaries. [employer] was designed to help you enhance your health and well-being through the following services: ? Transitions of Care  support for individuals who are transitioning from one care setting to another (example: Hospital to home). ? Chronic and Complex Care Coordination  support for individuals and caregivers of those with serious or chronic illnesses or with more than one chronic (ongoing) condition and those who take a number of different medications. If you meet specific medical criteria, a [facility] [address] may call you directly to coordinate your care with your primary care physician and your other care providers. For questions about the [facility] programs, please, contact your physicians office. For general questions or additional information about Accountable Care Organizations: 
Please visit www.medicare.gov/acos. html or call 1-800-MEDICARE (5-702.104.5108) TTY users should call 5-114.676.2424. Introducing [address]! Pritesh Claudette introduces [facility] patient portal. Now you can access parts of your medical record, email your doctor's office, and request medication refills online. 1. In your internet browser, go to [email or web address] 2. Click on the First Time User? Click Here link in the Sign In box. You will see the New Member Sign Up page. 3. Enter your [facility] Access Code exactly as it appears below. You will not need to use this code after youve completed the sign-up process. If you do not sign up before the expiration date, you must request a new code. · [facility] Access Code: [email or web address] Expires: 10/23/2017  7:58 AM 
 
4. Enter the last four digits of your Social Security Number (xxxx) and Date of Birth (mm/dd/yyyy) as indicated and click Submit. You will be taken to the next sign-up page. 5. Create a [facility] ID. This will be your [facility] login ID and cannot be changed, so think of one that is secure and easy to remember. 6. Create a [facility] password. You can change your password at any time. 7. Enter your Password Reset Question and Answer. This can be used at a later time if you forget your password. 8. Enter your e-mail address. You will receive e-mail notification when new information is available in [address] 9. Click Sign Up. You can now view and download portions of your medical record. 10. Click the Download Summary menu link to download a portable copy of your medical information. If you have questions, please visit the Frequently Asked Questions section of the [facility] website. Remember, [facility] is NOT to be used for urgent needs. For medical emergencies, dial 911. Now available from your iPhone and Android! General Information Please provide this summary of care documentation to your next provider.  
  
  
    
    
 Patient Signature: ____________________________________________________________ Date:  ____________________________________________________________  
  
Adeel Shove Provider Signature:  ____________________________________________________________ Date:  ____________________________________________________________

## 2017-10-10 NOTE — ANESTHESIA PREPROCEDURE EVALUATION
Anesthetic History   No history of anesthetic complications            Review of Systems / Medical History  Patient summary reviewed, nursing notes reviewed and pertinent labs reviewed    Pulmonary  Within defined limits                 Neuro/Psych   Within defined limits           Cardiovascular    Hypertension          CAD (? hx of stent placement)    Exercise tolerance: >4 METS     GI/Hepatic/Renal     GERD           Endo/Other    Diabetes    Morbid obesity and arthritis     Other Findings              Physical Exam    Airway  Mallampati: III  TM Distance: 4 - 6 cm  Neck ROM: normal range of motion   Mouth opening: Normal     Cardiovascular  Regular rate and rhythm,  S1 and S2 normal,  no murmur, click, rub, or gallop             Dental    Dentition: Poor dentition     Pulmonary  Breath sounds clear to auscultation               Abdominal  GI exam deferred       Other Findings            Anesthetic Plan    ASA: 2  Anesthesia type: total IV anesthesia and MAC          Induction: Intravenous  Anesthetic plan and risks discussed with: Patient

## 2017-10-10 NOTE — H&P (VIEW-ONLY)
Surgery Consult:  lipoma  Requesting physician:  Dr. Marichuy Burgess    Subjective:   Patient 71 y.o.  male s/p excision of left posterior shoulder lipoma 12 years ago presents with recurrent lump at the same spot. Patient noticed it 2 years ago and it's increasing in size. Patient also complains of pain especially when lean against it. Denies any history of infection in this location. No skin changes overlying the lump. Patient would like to have it excised. Past Medical & Surgical History:  Past Medical History:   Diagnosis Date    Arthritis     BPH (benign prostatic hyperplasia) 1/5/2015    Enlarged prostate     GERD (gastroesophageal reflux disease)     Rotator cuff tear       Past Surgical History:   Procedure Laterality Date    HX CHOLECYSTECTOMY      HX HERNIA REPAIR  2001    HX MOHS PROCEDURES      HX ORTHOPAEDIC  2015    Knee replacement and shoulder       Social History:  Social History     Social History    Marital status:      Spouse name: N/A    Number of children: N/A    Years of education: N/A     Occupational History    Not on file. Social History Main Topics    Smoking status: Former Smoker    Smokeless tobacco: Never Used    Alcohol use No    Drug use: No    Sexual activity: Not on file     Other Topics Concern    Not on file     Social History Narrative        Family History:  Family History   Problem Relation Age of Onset   Kerline Bryan Cancer Mother     Heart Disease Father     Cancer Sister     Diabetes Brother         Medications:  Current Outpatient Prescriptions   Medication Sig    ciprofloxacin HCl (CIPRO) 500 mg tablet Take  by mouth two (2) times daily as needed (Low Abdomin Infections due to bladder not emptying).  loratadine (CLARITIN) 10 mg tablet Take 10 mg by mouth daily as needed for Allergies.  lisinopril (PRINIVIL, ZESTRIL) 10 mg tablet Take 1 Tab by mouth daily.  aspirin delayed-release 81 mg tablet Take 1 Tab by mouth daily.     diclofenac EC (VOLTAREN) 75 mg EC tablet Take twice a day  as needed for arthritis pain    famotidine (PEPCID) 40 mg tablet Take 1 Tab by mouth daily.  finasteride (PROSCAR) 5 mg tablet Take 5 mg by mouth daily.  doxazosin (CARDURA) 4 mg tablet Take  by mouth daily. No current facility-administered medications for this visit. Allergies: Allergies   Allergen Reactions    Pcn [Penicillins] Hives    Ultram [Tramadol] Nausea and Vomiting       Review of Systems  A comprehensive review of systems was negative except for that written in the HPI. Objective:     Exam:    Visit Vitals    BP (!) 157/91    Pulse 75    Resp 24    Ht 5' 8\" (1.727 m)    Wt 99.3 kg (219 lb)    SpO2 100%    BMI 33.3 kg/m2     General appearance: alert, cooperative, no distress, appears stated age  Lungs: clear to auscultation bilaterally  Heart: regular rate and rhythm  Extremities: extremities normal, atraumatic, no cyanosis or edema. JONO. Skin: Skin color, texture, turgor normal.   Left posterior shoulder with 6 x 7 cm rubbery mobile non-tender mass. No erythema. No skin changes overlying the mass except for well healed incision over it. Neurologic: Grossly normal      Assessment:     Recurrent left posterior shoulder lipoma    Plan:     Excision of recurrent left posterior shoulder lipoma  Risks, benefit, and alternative to surgery was discussed with the patient. The risks of surgery include but not limited to infection, bleeding, recurrence, and the risks of anesthetic. Patient is agreeable to surgery. All questions answered.

## 2017-10-10 NOTE — INTERVAL H&P NOTE
H&P Update:  Lili Cole was seen and examined. History and physical has been reviewed. The patient has been examined.  There have been no significant clinical changes since the completion of the originally dated History and Physical.    Signed By: Chloé Simmons MD     October 10, 2017 11:08 AM

## 2017-10-11 DIAGNOSIS — K21.9 GASTROESOPHAGEAL REFLUX DISEASE WITHOUT ESOPHAGITIS: ICD-10-CM

## 2017-10-11 RX ORDER — FAMOTIDINE 40 MG/1
TABLET, FILM COATED ORAL
Qty: 30 TAB | Refills: 5 | Status: SHIPPED | OUTPATIENT
Start: 2017-10-11 | End: 2018-04-03 | Stop reason: SDUPTHER

## 2017-10-12 ENCOUNTER — TELEPHONE (OUTPATIENT)
Dept: SURGERY | Age: 72
End: 2017-10-12

## 2017-10-12 NOTE — TELEPHONE ENCOUNTER
Returned call to wife of patient. Instructed she may cover incision with bandage that should be fine. She is agreeable.

## 2017-10-12 NOTE — TELEPHONE ENCOUNTER
Patient's wife wanted to let nurse know some of the glue has come off of surgical site and also there is bloody discharge. Please return call today.

## 2017-10-13 ENCOUNTER — TELEPHONE (OUTPATIENT)
Dept: SURGERY | Age: 72
End: 2017-10-13

## 2017-10-17 ENCOUNTER — OFFICE VISIT (OUTPATIENT)
Dept: FAMILY MEDICINE CLINIC | Age: 72
End: 2017-10-17

## 2017-10-17 VITALS
WEIGHT: 216.4 LBS | OXYGEN SATURATION: 95 % | HEART RATE: 70 BPM | TEMPERATURE: 97 F | RESPIRATION RATE: 18 BRPM | BODY MASS INDEX: 32.9 KG/M2 | SYSTOLIC BLOOD PRESSURE: 150 MMHG | DIASTOLIC BLOOD PRESSURE: 80 MMHG

## 2017-10-17 DIAGNOSIS — D17.1 LIPOMA OF BACK: ICD-10-CM

## 2017-10-17 DIAGNOSIS — I10 ESSENTIAL HYPERTENSION: Primary | ICD-10-CM

## 2017-10-17 DIAGNOSIS — K21.9 GASTROESOPHAGEAL REFLUX DISEASE WITHOUT ESOPHAGITIS: ICD-10-CM

## 2017-10-17 DIAGNOSIS — E78.5 HYPERLIPIDEMIA, UNSPECIFIED HYPERLIPIDEMIA TYPE: ICD-10-CM

## 2017-10-17 RX ORDER — LISINOPRIL 20 MG/1
20 TABLET ORAL DAILY
Qty: 30 TAB | Refills: 2 | Status: SHIPPED | OUTPATIENT
Start: 2017-10-17 | End: 2017-12-19 | Stop reason: SDUPTHER

## 2017-10-17 NOTE — PROGRESS NOTES
Chief Complaint   Patient presents with    Follow-up     1 mo f/u; pt had cyst removed 10/10/17     Health Maintenance Due   Topic Date Due    Hepatitis C Screening  1945    GLAUCOMA SCREENING Q2Y  03/31/2011     Visit Vitals    /82 (BP 1 Location: Right arm, BP Patient Position: Sitting)    Pulse 70    Temp 97 °F (36.1 °C) (Oral)    Resp 18    Wt 216 lb 6.4 oz (98.2 kg)    SpO2 95%    BMI 32.9 kg/m2     Leonidas Velasquez LPN

## 2017-10-17 NOTE — PROGRESS NOTES
Cielo Antonio is a 71 y.o. male who presents to the office today with the following:  Chief Complaint   Patient presents with    Follow-up     1 mo f/u; pt had cyst removed 10/10/17       Allergies   Allergen Reactions    Pcn [Penicillins] Hives    Ultram [Tramadol] Nausea and Vomiting       Current Outpatient Prescriptions   Medication Sig    lisinopril (PRINIVIL, ZESTRIL) 20 mg tablet Take 1 Tab by mouth daily.  famotidine (PEPCID) 40 mg tablet TAKE 1 TABLET BY MOUTH EVERY DAY    docusate sodium (COLACE) 100 mg capsule Take 1 Cap by mouth two (2) times a day.  HYDROcodone-acetaminophen (NORCO) 5-325 mg per tablet Take 1-2 Tabs by mouth every four (4) hours as needed for Pain. Max Daily Amount: 12 Tabs.  ciprofloxacin HCl (CIPRO) 500 mg tablet Take  by mouth two (2) times daily as needed (Low Abdomin Infections due to bladder not emptying).  loratadine (CLARITIN) 10 mg tablet Take 10 mg by mouth daily as needed for Allergies.  diclofenac EC (VOLTAREN) 75 mg EC tablet Take twice a day  as needed for arthritis pain    finasteride (PROSCAR) 5 mg tablet Take 5 mg by mouth daily.  doxazosin (CARDURA) 4 mg tablet Take  by mouth daily.  aspirin delayed-release 81 mg tablet Take 1 Tab by mouth daily. No current facility-administered medications for this visit.         Past Medical History:   Diagnosis Date    Arthritis     BPH (benign prostatic hyperplasia) 1/5/2015    Diabetes ([address]     borderline    Enlarged prostate     GERD (gastroesophageal reflux disease)     Hypertension     Ill-defined condition 2017    lt shoulder lipoma    Rotator cuff tear        Past Surgical History:   Procedure Laterality Date    HX CHOLECYSTECTOMY      HX CYST REMOVAL  10/10/2017    HX HERNIA REPAIR  2001    HX MOHS PROCEDURES      HX ORTHOPAEDIC  2015     lt  shoulder replacement    HX ROTATOR CUFF REPAIR      rt shoulder       History   Smoking Status    Former Smoker    Quit date: 10/5/2009   Smokeless Tobacco    Never Used       Family History   Problem Relation Age of Onset   [facility] Cancer Mother     Heart Disease Father     Cancer Sister     Diabetes Brother          History of Present Illness:  Patient here for ongoing medical follow-up    Patient with a history of a large lipoma left shoulder blade area. I saw him last month and referred him back to surgery. That was removed earlier this month. He is tolerated the procedure well. He has a follow-up with his surgeon later on this week    Patient recently diagnosed with hypertension. At the last visit I did start him on lisinopril. Tolerating the medication. Blood pressure improved but still elevated today. No cardiac complaints. EKG 7/17. Base metabolic profile 6/19 normal.  I have started daily aspirin as well    Patient with borderline hyperlipidemia with an LDL of 112. Patient has been given a low-cholesterol diet. I plan to repeat lipid studies when I see him back in 2 months and would consider statin therapy if not improved. Patient does have a history of BPH. He follows with urology. Currently on Proscar and Cardura. He has as needed Cipro to take for UTIs from his urologist.  He saw them October 2017    Patient does have elevated BMI. We did discuss importance of diet and exercise. Patient with a history of GERD. On Pepcid as needed. Asymptomatic. Patient does have DJD. He takes Voltaren on a daily basis. I did suggest he uses only as needed given potential risks of medication. Patient with a history of borderline diabetes but lab work 7/17 essentially normal with an A1c 5.5      Patient declines Hemoccult or colonoscopies.   Aware of the indications    Patient declines any and all immunizations      Review of Systems:    Review of systems negative except as noted above      Physical Exam:  Visit Vitals    /80    Pulse 70    Temp 97 °F (36.1 °C) (Oral)    Resp 18    Wt 216 lb 6.4 oz (98.2 kg)    SpO2 95%    BMI 32.9 kg/m2     Vitals:    10/17/17 1059 10/17/17 1113   BP: 159/82 150/80   BP 1 Location: Right arm    BP Patient Position: Sitting    Pulse: 70    Resp: 18    Temp: 97 °F (36.1 °C)    TempSrc: Oral    SpO2: 95%    Weight: 216 lb 6.4 oz (98.2 kg)    Patient no acute distress vital signs stable as above on repeat  Head is normocephalic  External ears normal.  Ear canals normal TMs were clear. Hearing grossly normal  Eyes PERRLA. EOMs full. Sclera clear. I did recommend routine ophthalmologic exam  Nose within normal limits. Nares  normal.  No significant congestion  OP mucosa normal, no obvious lesions. Pharynx normal.  No erythema or exudate. Structures midline. Poor dentition. I recommended follow-up with his dentist  Neck no nodes no masses no bruits  Chest was clear no wheezes rhonchi or rales. Good air exchange  Left shoulder blade area showed healing excision of the lipoma. No erythema or signs of infection  Cor regular rate and rhythm no S3-S4 no murmurs  Abdomen obese no HSM no masses soft and was nontender  Extremities no edema. Nontender. Good range of motion  Gait and gross neurologic exam were normal      Assessment/Plan:  1. Essential hypertension  Improved on his current regimen. I am increasing his lisinopril to 20 mg daily. I will see him back in 2 months for recheck and lab work  - lisinopril (PRINIVIL, ZESTRIL) 20 mg tablet; Take 1 Tab by mouth daily. Dispense: 30 Tab; Refill: 2    2. Hyperlipidemia, unspecified hyperlipidemia type  Working on diet. We will recheck lab work in 2 months    3. BMI 33.0-33.9,adult  Diet stressed    4. Gastroesophageal reflux disease without esophagitis  Asymptomatic    5. Lipoma of back  Following surgery      Patient Instructions   Continue current medications  Increase lisinopril to 20 mg    Work on diet and exercise      Keep planned follow up visit         Continue current therapy plan except for indicated above.  Verbal and written instructions (see AVS) provided.  Patient expresses understanding of diagnosis and treatment plan. Follow-up Disposition:  Return in about 2 months (around 12/17/2017). Leelee Villa.  Nathaly Yang MD

## 2017-10-17 NOTE — MR AVS SNAPSHOT
Visit Information Date & Time Provider Department Dept. Phone Encounter #  
 10/17/2017 10:50 AM Dixie Andujar MD [facility] [id number] Follow-up Instructions Return in about 2 months (around 12/17/2017). Your Appointments 10/24/2017 10:00 AM  
ESTABLISHED PATIENT with Amish Torres MD  
[facility] Dr. Nik Soriano [facility] ([facility]) Appt Note: Excision left shoulder lipoma 10/10/17  
 [address] [facility] [address] [facility], [address] [facility] [address], [address] [address]  
[address], [address], [address] [facility] [address], [address] [facility] [address] [address] Upcoming Health Maintenance Date Due Hepatitis C Screening 1945 GLAUCOMA SCREENING Q2Y 3/31/2011 FOBT Q 1 YEAR AGE 50-75 8/1/2018* MEDICARE YEARLY EXAM 7/22/2018 Pneumococcal 65+ Low/Medium Risk (2 of 2 - PPSV23) 9/13/2018 DTaP/Tdap/Td series (2 - Td) 9/13/2027 *Topic was postponed. The date shown is not the original due date. Allergies as of 10/17/2017  Review Complete On: 10/17/2017 By: Dixie Andujar MD  
  
 Severity Noted Reaction Type Reactions Pcn [Penicillins]  12/23/2013    Hives Ultram [Tramadol]  09/13/2017    Nausea and Vomiting Current Immunizations  Never Reviewed No immunizations on file. Not reviewed this visit You Were Diagnosed With   
  
 Codes Comments Essential hypertension    -  Primary ICD-10-CM: I10 
ICD-9-CM: 401.9 Hyperlipidemia, unspecified hyperlipidemia type     ICD-10-CM: E78.5 ICD-9-CM: 272.4 BMI 33.0-33.9,adult     ICD-10-CM: C17.94 
ICD-9-CM: V85.33 Gastroesophageal reflux disease without esophagitis     ICD-10-CM: K21.9 ICD-9-CM: 530.81 Lipoma of back     ICD-10-CM: D17.1 ICD-9-CM: 214.8 Vitals BP Pulse Temp Resp Weight(growth percentile) SpO2  
 150/80 70 97 °F (36.1 °C) (Oral) 18 216 lb 6.4 oz (98.2 kg) 95% BMI Smoking Status 32.9 kg/m2 Former Smoker Vitals History BMI and BSA Data Body Mass Index Body Surface Area 32.9 kg/m 2 2.17 m 2 Preferred Pharmacy Pharmacy Name Phone [facility]/PHARMACY #[id number] [place] [phone] Your Updated Medication List  
  
   
This list is accurate as of: 10/17/17 11:17 AM.  Always use your most recent med list.  
  
  
  
  
 aspirin delayed-release 81 mg tablet Take 1 Tab by mouth daily. CIPRO 500 mg tablet Generic drug:  ciprofloxacin HCl Take  by mouth two (2) times daily as needed (Low Abdomin Infections due to bladder not emptying). CLARITIN 10 mg tablet Generic drug:  loratadine Take 10 mg by mouth daily as needed for Allergies. diclofenac EC 75 mg EC tablet Commonly known as:  VOLTAREN Take twice a day  as needed for arthritis pain  
  
 docusate sodium 100 mg capsule Commonly known as:  Ribera Lass Take 1 Cap by mouth two (2) times a day. doxazosin 4 mg tablet Commonly known as:  CARDURA Take  by mouth daily. famotidine 40 mg tablet Commonly known as:  PEPCID  
TAKE 1 TABLET BY MOUTH EVERY DAY  
  
 finasteride 5 mg tablet Commonly known as:  PROSCAR Take 5 mg by mouth daily. HYDROcodone-acetaminophen 5-325 mg per tablet Commonly known as:  [address] Take 1-2 Tabs by mouth every four (4) hours as needed for Pain. Max Daily Amount: 12 Tabs. lisinopril 20 mg tablet Commonly known as:  Donnald Code Take 1 Tab by mouth daily. Prescriptions Sent to Pharmacy Refills  
 lisinopril (PRINIVIL, ZESTRIL) 20 mg tablet 2 Sig: Take 1 Tab by mouth daily. Class: Normal  
 Pharmacy: [facility]/pharmacy #[id number] [address] [place] Ph #: [phone] Route: Oral  
  
Follow-up Instructions Return in about 2 months (around 12/17/2017). Patient Instructions Continue current medications Increase lisinopril to 20 mg Work on diet and exercise Keep planned follow up visit Introducing [facility] & HE[facility]! [employer] Serv[employer] introduces iCouch patient portal. Now you can access parts of your medical record, email your doctor's office, and request medication refills online. 1. In your internet browser, go to [email or web address] 2. Click on the First Time User? Click Here link in the Sign In box. You will see the New Member Sign Up page. 3. Enter your iCouch Access Code exactly as it appears below. You will not need to use this code after youve completed the sign-up process. If you do not sign up before the expiration date, you must request a new code. · iCouch Access Code: [email or web address] Expires: 10/23/2017  7:58 AM 
 
4. Enter the last four digits of your Social Security Number (xxxx) and Date of Birth (mm/dd/yyyy) as indicated and click Submit. You will be taken to the next sign-up page. 5. Create a iCouch ID. This will be your iCouch login ID and cannot be changed, so think of one that is secure and easy to remember. 6. Create a iCouch password. You can change your password at any time. 7. Enter your Password Reset Question and Answer. This can be used at a later time if you forget your password. 8. Enter your e-mail address. You will receive e-mail notification when new information is available in [address] 9. Click Sign Up. You can now view and download portions of your medical record. 10. Click the Download Summary menu link to download a portable copy of your medical information. If you have questions, please visit the Frequently Asked Questions section of the iCouch website. Remember, iCouch is NOT to be used for urgent needs. For medical emergencies, dial 911. Now available from your iPhone and Android! Please provide this summary of care documentation to your next provider. Your primary care clinician is listed as Fallon Finn. If you have any questions after today's visit, please call [phone].

## 2017-10-17 NOTE — PATIENT INSTRUCTIONS
Continue current medications  Increase lisinopril to 20 mg    Work on diet and exercise      Keep planned follow up visit

## 2017-10-24 ENCOUNTER — OFFICE VISIT (OUTPATIENT)
Dept: SURGERY | Age: 72
End: 2017-10-24

## 2017-10-24 VITALS
BODY MASS INDEX: 32.58 KG/M2 | DIASTOLIC BLOOD PRESSURE: 87 MMHG | HEART RATE: 84 BPM | WEIGHT: 215 LBS | HEIGHT: 68 IN | RESPIRATION RATE: 16 BRPM | OXYGEN SATURATION: 95 % | SYSTOLIC BLOOD PRESSURE: 142 MMHG

## 2017-10-24 DIAGNOSIS — Z09 POSTOPERATIVE EXAMINATION: Primary | ICD-10-CM

## 2017-10-24 NOTE — MR AVS SNAPSHOT
Visit Information Date & Time Provider Department Dept. Phone Encounter #  
 10/24/2017 10:00 AM Agata Terrazas MD [facility] [place] [facility] [id number] Your Appointments 10/24/2017 10:00 AM  
POST OP with Agata Terrazas MD  
[facility] Dr. Nik Sousa ([facility]) Appt Note: Excision left shoulder lipoma 10/10/17; .  
 [address], [address], [address] [address]  
[address], [address], [address] [address]  
  
    
 12/19/2017 10:10 AM  
ESTABLISHED PATIENT with Marlyse Holter, MD  
[address] [place] [address] Appt Note: 2 mo f/u $0 CP mbm  
 [phone]  
[phone]  
  
   
 [address] [address] Upcoming Health Maintenance Date Due Hepatitis C Screening 1945 GLAUCOMA SCREENING Q2Y 3/31/2011 FOBT Q 1 YEAR AGE 50-75 8/1/2018* MEDICARE YEARLY EXAM 7/22/2018 Pneumococcal 65+ Low/Medium Risk (2 of 2 - PPSV23) 9/13/2018 DTaP/Tdap/Td series (2 - Td) 9/13/2027 *Topic was postponed. The date shown is not the original due date. Allergies as of 10/24/2017  Review Complete On: 10/24/2017 By: Lino Hartman Severity Noted Reaction Type Reactions Pcn [Penicillins]  12/23/2013    Hives Ultram [Tramadol]  09/13/2017    Nausea and Vomiting Current Immunizations  Never Reviewed No immunizations on file. Not reviewed this visit Vitals BP Pulse Resp Height(growth percentile) Weight(growth percentile) SpO2  
 142/87 (BP 1 Location: Left arm, BP Patient Position: Sitting) 84 16 5' 8\" (1.727 m) 215 lb (97.5 kg) 95% BMI Smoking Status 32.69 kg/m2 Former Smoker BMI and BSA Data Body Mass Index Body Surface Area  
 32.69 kg/m 2 2.16 m 2 Preferred Pharmacy Pharmacy Name Phone [facility]/PHARMACY #[id number] [facility] [place] [phone] Your Updated Medication List  
  
   
This list is accurate as of: 10/24/17  9:47 AM.  Always use your most recent med list.  
  
  
  
  
 aspirin delayed-release 81 mg tablet Take 1 Tab by mouth daily. CIPRO 500 mg tablet Generic drug:  ciprofloxacin HCl Take  by mouth two (2) times daily as needed (Low Abdomin Infections due to bladder not emptying). CLARITIN 10 mg tablet Generic drug:  loratadine Take 10 mg by mouth daily as needed for Allergies. diclofenac EC 75 mg EC tablet Commonly known as:  VOLTAREN Take twice a day  as needed for arthritis pain  
  
 docusate sodium 100 mg capsule Commonly known as:  Annice Curry Take 1 Cap by mouth two (2) times a day. doxazosin 4 mg tablet Commonly known as:  CARDURA Take  by mouth daily. famotidine 40 mg tablet Commonly known as:  PEPCID  
TAKE 1 TABLET BY MOUTH EVERY DAY  
  
 finasteride 5 mg tablet Commonly known as:  PROSCAR Take 5 mg by mouth daily. lisinopril 20 mg tablet Commonly known as:  Aury Escort Take 1 Tab by mouth daily. Introducing [facility]! [employer] introduces [employer] patient portal. Now you can access parts of your medical record, email your doctor's office, and request medication refills online. 1. In your internet browser, go to [email or web address] 2. Click on the First Time User? Click Here link in the Sign In box. You will see the New Member Sign Up page. 3. Enter your [employer] Access Code exactly as it appears below. You will not need to use this code after youve completed the sign-up process. If you do not sign up before the expiration date, you must request a new code. · [employer] Access Code: [email or web address] Expires: 1/22/2018  9:32 AM 
 
4.  Enter the last four digits of your Social Security Number (xxxx) and Date of Birth (mm/dd/yyyy) as indicated and click Submit. You will be taken to the next sign-up page. 5. Create a [employer] ID. This will be your [employer] login ID and cannot be changed, so think of one that is secure and easy to remember. 6. Create a [employer] password. You can change your password at any time. 7. Enter your Password Reset Question and Answer. This can be used at a later time if you forget your password. 8. Enter your e-mail address. You will receive e-mail notification when new information is available in [address] 9. Click Sign Up. You can now view and download portions of your medical record. 10. Click the Download Summary menu link to download a portable copy of your medical information. If you have questions, please visit the Frequently Asked Questions section of the [employer] website. Remember, [employer] is NOT to be used for urgent needs. For medical emergencies, dial 911. Now available from your iPhone and Android! Please provide this summary of care documentation to your next provider. Your primary care clinician is listed as Lyndon Hansen. If you have any questions after today's visit, please call [phone].

## 2017-10-25 NOTE — PROGRESS NOTES
Patient is here for follow-up. Patient underwent excision of left posterior shoulder lipoma on 10/10/17. Doing well. No complaints. Path reviewed with the patient. PE:  Visit Vitals    /87 (BP 1 Location: Left arm, BP Patient Position: Sitting)    Pulse 84    Resp 16    Ht 5' 8\" (1.727 m)    Wt 97.5 kg (215 lb)    SpO2 95%    BMI 32.69 kg/m2     Skin:  Left posterior shoulder Inc C/D/I. Small seroma. No erythema or drainage. Assessment:    S/p excision of left posterior shoulder lipoma  Atypical lipoma    Plan:  -Given the pathology, option of going back and doing wide excision discussed with the patient. Risks, benefit, and alternative to surgery was discussed with the patient. At this time, patient wants to hold off on surgery and observe for now. -RTC in 6 months.

## 2017-12-19 ENCOUNTER — OFFICE VISIT (OUTPATIENT)
Dept: FAMILY MEDICINE CLINIC | Age: 72
End: 2017-12-19

## 2017-12-19 VITALS
BODY MASS INDEX: 32.86 KG/M2 | DIASTOLIC BLOOD PRESSURE: 80 MMHG | OXYGEN SATURATION: 96 % | HEIGHT: 68 IN | RESPIRATION RATE: 16 BRPM | TEMPERATURE: 96.7 F | WEIGHT: 216.8 LBS | HEART RATE: 60 BPM | SYSTOLIC BLOOD PRESSURE: 116 MMHG

## 2017-12-19 DIAGNOSIS — E78.5 HYPERLIPIDEMIA, UNSPECIFIED HYPERLIPIDEMIA TYPE: ICD-10-CM

## 2017-12-19 DIAGNOSIS — K21.9 GASTROESOPHAGEAL REFLUX DISEASE WITHOUT ESOPHAGITIS: ICD-10-CM

## 2017-12-19 DIAGNOSIS — I10 ESSENTIAL HYPERTENSION: Primary | ICD-10-CM

## 2017-12-19 RX ORDER — LISINOPRIL 20 MG/1
20 TABLET ORAL DAILY
Qty: 30 TAB | Refills: 6 | Status: SHIPPED | OUTPATIENT
Start: 2017-12-19 | End: 2018-02-15 | Stop reason: SDUPTHER

## 2017-12-19 NOTE — MR AVS SNAPSHOT
Visit Information Date & Time Provider Department Dept. Phone Encounter #  
 12/19/2017 10:10 AM Scott Berkowitz MD [address] [place] [address] [phone] [id number] Follow-up Instructions Return in about 6 months (around 6/19/2018). Follow-up and Disposition History Your Appointments 4/24/2018 10:00 AM  
ESTABLISHED PATIENT with Gurpreet Taylor MD  
[facility] Dr. Nik Sousa ([facility]) Appt Note: 6 mo f/u Excision left shoulder lipoma 10/10/17  
 [address] [place] [address], [address] [facility], [address] [address]  
[address], [address] [place], [address] [facility], [address] [facility] [address] [address] Upcoming Health Maintenance Date Due Hepatitis C Screening 1945 GLAUCOMA SCREENING Q2Y 3/31/2011 FOBT Q 1 YEAR AGE 50-75 8/1/2018* MEDICARE YEARLY EXAM 7/22/2018 Pneumococcal 65+ Low/Medium Risk (2 of 2 - PPSV23) 9/13/2018 DTaP/Tdap/Td series (2 - Td) 9/13/2027 *Topic was postponed. The date shown is not the original due date. Allergies as of 12/19/2017  Review Complete On: 12/19/2017 By: Scott Berkowitz MD  
  
 Severity Noted Reaction Type Reactions Pcn [Penicillins]  12/23/2013    Hives Ultram [Tramadol]  09/13/2017    Nausea and Vomiting Current Immunizations  Never Reviewed No immunizations on file. Not reviewed this visit You Were Diagnosed With   
  
 Codes Comments Essential hypertension    -  Primary ICD-10-CM: I10 
ICD-9-CM: 401.9 Hyperlipidemia, unspecified hyperlipidemia type     ICD-10-CM: E78.5 ICD-9-CM: 272.4 BMI 33.0-33.9,adult     ICD-10-CM: V36.02 
ICD-9-CM: V85.33 Gastroesophageal reflux disease without esophagitis     ICD-10-CM: K21.9 ICD-9-CM: 530.81 Vitals BP Pulse Temp Resp Height(growth percentile) Weight(growth percentile)  116/80 (BP 1 Location: Left arm, BP Patient Position: Sitting) 60 96.7 °F (35.9 °C) (Oral) 16 5' 8\" (1.727 m) 216 lb 12.8 oz (98.3 kg) SpO2 BMI Smoking Status 96% 32.96 kg/m2 Former Smoker Vitals History BMI and BSA Data Body Mass Index Body Surface Area  
 32.96 kg/m 2 2.17 m 2 Preferred Pharmacy Pharmacy Name Phone [id number] [place] [phone] Your Updated Medication List  
  
   
This list is accurate as of: 12/19/17 11:02 AM.  Always use your most recent med list.  
  
  
  
  
 CIPRO 500 mg tablet Generic drug:  ciprofloxacin HCl Take  by mouth two (2) times daily as needed (Low Abdomin Infections due to bladder not emptying). CLARITIN 10 mg tablet Generic drug:  loratadine Take 10 mg by mouth daily as needed for Allergies. diclofenac EC 75 mg EC tablet Commonly known as:  VOLTAREN Take twice a day  as needed for arthritis pain  
  
 docusate sodium 100 mg capsule Commonly known as:  Lucetta France Take 1 Cap by mouth two (2) times a day. doxazosin 4 mg tablet Commonly known as:  CARDURA Take  by mouth daily. famotidine 40 mg tablet Commonly known as:  PEPCID  
TAKE 1 TABLET BY MOUTH EVERY DAY  
  
 finasteride 5 mg tablet Commonly known as:  PROSCAR Take 5 mg by mouth daily. lisinopril 20 mg tablet Commonly known as:  Mollie Ripa Take 1 Tab by mouth daily. Prescriptions Sent to Pharmacy Refills  
 lisinopril (PRINIVIL, ZESTRIL) 20 mg tablet 6 Sig: Take 1 Tab by mouth daily. Class: Normal  
 Pharmacy: [facility] #[id number] [address] [place] Ph #: [phone] Route: Oral  
  
We Performed the Following ALT [id number] CPT(R)] AST [id number] CPT(R)] LIPID PANEL [90031 CPT(R)] METABOLIC PANEL, BASIC [80760 CPT(R)] TSH 3RD GENERATION [67774 CPT(R)] URINALYSIS W/ RFLX MICROSCOPIC [99712 CPT(R)] Follow-up Instructions Return in about 6 months (around 6/19/2018). Patient Instructions Continue current medications Work on diet and exercise Lab work Keep planned follow up visit Introducing \[facility]\"" & HEALTH SERVICES! Marip[employer] Dykes introduces [employer] patient portal. Now you can access parts of your medical record, email your doctor's office, and request medication refills online. 1. In your internet browser, go to [email or web address] 2. Click on the First Time User? Click Here link in the Sign In box. You will see the New Member Sign Up page. 3. Enter your [employer] Access Code exactly as it appears below. You will not need to use this code after youve completed the sign-up process. If you do not sign up before the expiration date, you must request a new code. · [employer] Access Code: [email or web address] Expires: 1/22/2018  8:32 AM 
 
4. Enter the last four digits of your Social Security Number (xxxx) and Date of Birth (mm/dd/yyyy) as indicated and click Submit. You will be taken to the next sign-up page. 5. Create a [employer] ID. This will be your [employer] login ID and cannot be changed, so think of one that is secure and easy to remember. 6. Create a [employer] password. You can change your password at any time. 7. Enter your Password Reset Question and Answer. This can be used at a later time if you forget your password. 8. Enter your e-mail address. You will receive e-mail notification when new information is available in [address] 9. Click Sign Up. You can now view and download portions of your medical record. 10. Click the Download Summary menu link to download a portable copy of your medical information. If you have questions, please visit the Frequently Asked Questions section of the [employer] website. Remember, [employer] is NOT to be used for urgent needs. For medical emergencies, dial 911. Now available from your iPhone and Android! Please provide this summary of care documentation to your next provider. Your primary care clinician is listed as Marlyse Holter. If you have any questions after today's visit, please call [phone].

## 2017-12-19 NOTE — PROGRESS NOTES
Nicole Timmons is a 71 y.o. male who presents to the office today with the following:  Chief Complaint   Patient presents with    Hypertension     f/u       Allergies   Allergen Reactions    Pcn [Penicillins] Hives    Ultram [Tramadol] Nausea and Vomiting       Current Outpatient Prescriptions   Medication Sig    lisinopril (PRINIVIL, ZESTRIL) 20 mg tablet Take 1 Tab by mouth daily.  famotidine (PEPCID) 40 mg tablet TAKE 1 TABLET BY MOUTH EVERY DAY    docusate sodium (COLACE) 100 mg capsule Take 1 Cap by mouth two (2) times a day.  loratadine (CLARITIN) 10 mg tablet Take 10 mg by mouth daily as needed for Allergies.  diclofenac EC (VOLTAREN) 75 mg EC tablet Take twice a day  as needed for arthritis pain    finasteride (PROSCAR) 5 mg tablet Take 5 mg by mouth daily.  doxazosin (CARDURA) 4 mg tablet Take  by mouth daily.  ciprofloxacin HCl (CIPRO) 500 mg tablet Take  by mouth two (2) times daily as needed (Low Abdomin Infections due to bladder not emptying). No current facility-administered medications for this visit.         Past Medical History:   Diagnosis Date    Arthritis     BPH (benign prostatic hyperplasia) 1/5/2015    Diabetes (HCC)     borderline    Enlarged prostate     GERD (gastroesophageal reflux disease)     Hypertension     Ill-defined condition 2017    lt shoulder lipoma    Rotator cuff tear        Past Surgical History:   Procedure Laterality Date    HX CHOLECYSTECTOMY      HX CYST REMOVAL  10/10/2017    HX HERNIA REPAIR  2001    HX MOHS PROCEDURES      HX ORTHOPAEDIC  2015     lt  shoulder replacement    HX PREMALIG/BENIGN SKIN LESION EXCISION  10/10/2017    Excision left shoulder cyst- Luly Anaya MD    HX ROTATOR CUFF REPAIR      rt shoulder       History   Smoking Status    Former Smoker    Quit date: 10/5/2009   Smokeless Tobacco    Never Used       Family History   Problem Relation Age of Onset    Cancer Mother     Heart Disease Father    [facility] Cancer Sister     Diabetes Brother          History of Present Illness:  Patient here for ongoing medical follow-up    Patient with a history of a large lipoma left shoulder blade area. Since the last visit he has undergone excision of this area. He tolerated the procedure well. The surgeon apparently wanted to go back and do a second procedure as they felt there was some residual lipoma which would grow back. The patient is aware but declines. Patient recently diagnosed with hypertension. On lisinopril. We have been adjusting his dose. He is tolerating the medication. Blood pressure in good control today. No cardiac complaints. EKG 7/17. Basic metabolic profile 6/51 normal.  I recommended aspirin therapy. The patient has declined. I recommended screening abdominal aortic ultrasound given his history of smoking. Patient declined    Patient with borderline hyperlipidemia with an LDL of 112. Patient has been given a low-cholesterol diet. I am repeating lab work today. He would be willing to consider medication if his lipid panel indicates that he would benefit from    Patient does have a history of BPH. He follows with urology. Currently on Proscar and Cardura. He has as needed Cipro to take for UTIs from his urologist.  He saw them October 2017    Patient does have elevated BMI. We did discuss importance of diet and exercise. Weight unchanged from the last visit. Patient with a history of GERD. On Pepcid as needed. Asymptomatic. Patient does have DJD. He takes Voltaren on a daily basis. I did suggest he uses only as needed given potential risks of medication. Patient with a history of borderline diabetes but lab work 7/17 essentially normal with an A1c 5.5      Patient declines Hemoccult or colonoscopies. Aware of the indications    Patient declines any and all immunizations.   Aware they are indicated      Review of Systems:    Review of systems negative except as noted above      Physical Exam:  Visit Vitals    /80 (BP 1 Location: Left arm, BP Patient Position: Sitting)    Pulse 60    Temp 96.7 °F (35.9 °C) (Oral)    Resp 16    Ht 5' 8\" (1.727 m)    Wt 216 lb 12.8 oz (98.3 kg)    SpO2 96%    BMI 32.96 kg/m2     Vitals:    12/19/17 0959   BP: 116/80   BP 1 Location: Left arm   BP Patient Position: Sitting   Pulse: 60   Resp: 16   Temp: 96.7 °F (35.9 °C)   TempSrc: Oral   SpO2: 96%   Weight: 216 lb 12.8 oz (98.3 kg)   Height: 5' 8\" (1.727 m)   Patient no acute distress vital signs stable as above  Head is normocephalic  External ears normal.  Ear canals normal TMs were clear. Hearing grossly normal  Eyes PERRLA. EOMs full. Sclera clear. I did recommend routine ophthalmologic exam  Nose within normal limits. Nares  normal.  No significant congestion  OP mucosa normal, no obvious lesions. Pharynx normal.  No erythema or exudate. Structures midline. Poor dentition. I recommended follow-up with his dentist  Neck no nodes no masses no bruits  Chest was clear no wheezes rhonchi or rales. Good air exchange  Left shoulder blade area showed healing excision of the lipoma. No evidence of recurrence   cor regular rate and rhythm no S3-S4 no murmurs  Abdomen obese no HSM no masses soft and was nontender  Extremities no edema. Nontender. Good range of motion  Gait and gross neurologic exam were normal      1. Essential hypertension  Blood pressure in good control on his current regimen. Overall patient appears medically stable. He is can continue with his current medication. Checking lab work today. He is can work harder on his diet and exercise program.  I would like to see him back in 6 months sooner if needed    - METABOLIC PANEL, BASIC  - URINALYSIS W/ RFLX MICROSCOPIC  - TSH 3RD GENERATION  - lisinopril (PRINIVIL, ZESTRIL) 20 mg tablet; Take 1 Tab by mouth daily. Dispense: 30 Tab; Refill: 6    2.  Hyperlipidemia, unspecified hyperlipidemia type  Lab work today. Patient work on diet. - LIPID PANEL  - AST  - ALT    3. BMI 33.0-33.9,adult  Importance of diet and exercise stressed    4. Gastroesophageal reflux disease without esophagitis  We will continue as needed Pepcid    Patient will keep planned annual follow-up with his urologist    Patient Instructions   Continue current medications    Work on diet and exercise    Lab work    Keep planned follow up visit         Continue current therapy plan except for indicated above. Verbal and written instructions (see AVS) provided.  Patient expresses understanding of diagnosis and treatment plan. Follow-up Disposition:  Return in about 6 months (around 6/19/2018). Gerardo Velez.  Naty Stuart MD

## 2017-12-20 LAB
ALT SERPL-CCNC: 38 IU/L (ref 0–44)
APPEARANCE UR: CLEAR
AST SERPL-CCNC: 29 IU/L (ref 0–40)
BILIRUB UR QL STRIP: NEGATIVE
BUN SERPL-MCNC: 19 MG/DL (ref 8–27)
BUN/CREAT SERPL: 22 (ref 10–24)
CALCIUM SERPL-MCNC: 8.9 MG/DL (ref 8.6–10.2)
CHLORIDE SERPL-SCNC: 100 MMOL/L (ref 96–106)
CHOLEST SERPL-MCNC: 194 MG/DL (ref 100–199)
CO2 SERPL-SCNC: 26 MMOL/L (ref 18–29)
COLOR UR: YELLOW
CREAT SERPL-MCNC: 0.88 MG/DL (ref 0.76–1.27)
GLUCOSE SERPL-MCNC: 106 MG/DL (ref 65–99)
GLUCOSE UR QL: ABNORMAL
HDLC SERPL-MCNC: 27 MG/DL
HGB UR QL STRIP: NEGATIVE
INTERPRETATION, 910389: NORMAL
KETONES UR QL STRIP: NEGATIVE
LDLC SERPL CALC-MCNC: 125 MG/DL (ref 0–99)
LEUKOCYTE ESTERASE UR QL STRIP: NEGATIVE
MICRO URNS: ABNORMAL
NITRITE UR QL STRIP: NEGATIVE
PH UR STRIP: 6 [PH] (ref 5–7.5)
POTASSIUM SERPL-SCNC: 4.9 MMOL/L (ref 3.5–5.2)
PROT UR QL STRIP: NEGATIVE
SODIUM SERPL-SCNC: 143 MMOL/L (ref 134–144)
SP GR UR: >=1.03 (ref 1–1.03)
TRIGL SERPL-MCNC: 212 MG/DL (ref 0–149)
TSH SERPL DL<=0.005 MIU/L-ACNC: 2.9 UIU/ML (ref 0.45–4.5)
UROBILINOGEN UR STRIP-MCNC: 0.2 MG/DL (ref 0.2–1)
VLDLC SERPL CALC-MCNC: 42 MG/DL (ref 5–40)

## 2017-12-20 RX ORDER — PRAVASTATIN SODIUM 40 MG/1
40 TABLET ORAL
Qty: 30 TAB | Refills: 2 | Status: SHIPPED | OUTPATIENT
Start: 2017-12-20 | End: 2018-03-19 | Stop reason: SDUPTHER

## 2017-12-20 NOTE — PROGRESS NOTES
Labs all stable and acceptable except lipids a bit higher with cholesterol at 194 with a low HDL  In light of his other cardiovascular risk factors I am recommending we start Pravachol 40 mg daily  Patient should work on his diet  Repeat lab work 2 months  Patient should notify me if he develops any side effects, myalgias etc.    Prescription sent to his pharmacy  Lab order in chart

## 2018-03-23 ENCOUNTER — TELEPHONE (OUTPATIENT)
Dept: FAMILY MEDICINE CLINIC | Age: 73
End: 2018-03-23

## 2018-04-03 DIAGNOSIS — K21.9 GASTROESOPHAGEAL REFLUX DISEASE WITHOUT ESOPHAGITIS: ICD-10-CM

## 2018-04-03 RX ORDER — FAMOTIDINE 40 MG/1
TABLET, FILM COATED ORAL
Qty: 30 TAB | Refills: 5 | Status: SHIPPED | OUTPATIENT
Start: 2018-04-03 | End: 2018-09-21 | Stop reason: SDUPTHER

## 2018-04-03 NOTE — TELEPHONE ENCOUNTER
Famotidine refilled  Patient to be reminded to get follow-up lab work ordered on his lipids and to schedule routine follow-up with me in [place]

## 2018-04-24 ENCOUNTER — OFFICE VISIT (OUTPATIENT)
Dept: SURGERY | Age: 73
End: 2018-04-24

## 2018-04-24 VITALS
DIASTOLIC BLOOD PRESSURE: 76 MMHG | TEMPERATURE: 98.6 F | SYSTOLIC BLOOD PRESSURE: 121 MMHG | HEIGHT: 68 IN | WEIGHT: 224 LBS | RESPIRATION RATE: 18 BRPM | BODY MASS INDEX: 33.95 KG/M2

## 2018-04-24 DIAGNOSIS — D17.79 LIPOMA OF OTHER SPECIFIED SITES: Primary | ICD-10-CM

## 2018-04-24 RX ORDER — FAMOTIDINE 40 MG/1
TABLET, FILM COATED ORAL
COMMUNITY
Start: 2018-04-03 | End: 2018-04-24 | Stop reason: SDUPTHER

## 2018-04-24 RX ORDER — PRAVASTATIN SODIUM 40 MG/1
TABLET ORAL
COMMUNITY
Start: 2018-03-19 | End: 2018-04-24 | Stop reason: SDUPTHER

## 2018-04-24 NOTE — PROGRESS NOTES
Chief Complaint   Patient presents with    Surgical Follow-up     6m f/u excision of left shoulder lipoma     1. Have you been to the ER, urgent care clinic since your last visit? Hospitalized since your last visit? No    2. Have you seen or consulted any other health care providers outside of the [address] [place] [address] since your last visit? Include any pap smears or colon screening.  No

## 2018-04-24 NOTE — PROGRESS NOTES
Patient is here for follow-up. Patient underwent excision of left posterior shoulder lipoma on 10/10/17. Doing well other than left shoulder pain. Patient had his left shoulder operated on by his Ortho. No obvious recurrent lump in the left posterior shoulder. PE:  Visit Vitals    /76 (BP 1 Location: Left arm, BP Patient Position: Sitting)    Temp 98.6 °F (37 °C) (Oral)    Resp 18    Ht 5' 8\" (1.727 m)    Wt 101.6 kg (224 lb)    BMI 34.06 kg/m2     Skin:  Left posterior shoulder Inc well healed. No obvious evidence of recurrence. Assessment:    S/p excision of left posterior shoulder lipoma  Atypical lipoma    Plan:  -F/u in 6 months.

## 2018-04-24 NOTE — MR AVS SNAPSHOT
[address] [facility] [address], [address] [facility], [address] [place] [address] [facility] 
[phone] Patient: Orconor Childs MRN: [id number] Visit Information Date & Time Provider Department Dept. Phone Encounter #  
 4/24/2018 10:00 AM Etelvina Gooden MD [facility] [place] [facility] [address] [id number] Your Appointments 10/23/2018 11:20 AM  
ESTABLISHED PATIENT with Etelvina Gooden MD  
[facility] Dr. Nik Soriano [facility] ([address] [facility]) Appt Note: 6 mo f/u Excision left shoulder lipoma [address] [place] [address], [address] [address]  
[address], [place], [address] [facility], [address] [place] [address] [address] [address] Upcoming Health Maintenance Date Due Hepatitis C Screening 1945 GLAUCOMA SCREENING Q2Y 3/31/2011 FOBT Q 1 YEAR AGE 50-75 8/1/2018* MEDICARE YEARLY EXAM 7/22/2018 Pneumococcal 65+ Low/Medium Risk (2 of 2 - PPSV23) 9/13/2018 DTaP/Tdap/Td series (2 - Td) 9/13/2027 *Topic was postponed. The date shown is not the original due date. Allergies as of 4/24/2018  Review Complete On: 4/24/2018 By: Etelvina Gooden MD  
  
 Severity Noted Reaction Type Reactions Pcn [Penicillins]  12/23/2013    Hives Ultram [Tramadol]  09/13/2017    Nausea and Vomiting Current Immunizations  Never Reviewed No immunizations on file. Not reviewed this visit You Were Diagnosed With   
  
 Codes Comments Lipoma of other specified sites    -  Primary ICD-10-CM: D17.79 ICD-9-CM: 214.8 Vitals BP Temp Resp Height(growth percentile) Weight(growth percentile) BMI  
 121/76 (BP 1 Location: Left arm, BP Patient Position: Sitting) 98.6 °F (37 °C) (Oral) 18 5' 8\" (1.727 m) 224 lb (101.6 kg) 34.06 kg/m2 Smoking Status Former Smoker Vitals History BMI and BSA Data Body Mass Index Body Surface Area 34.06 kg/m 2 2.21 m 2 Preferred Pharmacy Pharmacy Name Phone [id number] [place] [phone] Your Updated Medication List  
  
   
This list is accurate as of 4/24/18 10:14 AM.  Always use your most recent med list.  
  
  
  
  
 CIPRO 500 mg tablet Generic drug:  ciprofloxacin HCl Take  by mouth two (2) times daily as needed (Low Abdomin Infections due to bladder not emptying). CLARITIN 10 mg tablet Generic drug:  loratadine Take 10 mg by mouth daily as needed for Allergies. diclofenac EC 75 mg EC tablet Commonly known as:  VOLTAREN Take twice a day  as needed for arthritis pain  
  
 docusate sodium 100 mg capsule Commonly known as:  Mary Tania Take 1 Cap by mouth two (2) times a day. doxazosin 4 mg tablet Commonly known as:  CARDURA Take 4 mg by mouth daily. famotidine 40 mg tablet Commonly known as:  PEPCID  
TAKE 1 TABLET BY MOUTH EVERY DAY  
  
 finasteride 5 mg tablet Commonly known as:  PROSCAR Take 5 mg by mouth daily. lisinopril 20 mg tablet Commonly known as:  PRINIVIL, ZESTRIL  
TAKE 1 TABLET BY MOUTH EVERY DAY  
  
 pravastatin 40 mg tablet Commonly known as:  PRAVACHOL  
TAKE 1 TABLET BY MOUTH MONICA Introducing [facility] & HEALTH SERVICES! [employer] introduces [employer] patient portal. Now you can access parts of your medical record, email your doctor's office, and request medication refills online. 1. In your internet browser, go to [email or web address] 2. Click on the First Time User? Click Here link in the Sign In box. You will see the New Member Sign Up page. 3. Enter your [employer] Access Code exactly as it appears below. You will not need to use this code after youve completed the sign-up process. If you do not sign up before the expiration date, you must request a new code. · [employer] Access Code:  [email or web address] 
 Expires: 7/23/2018  9:52 AM 
 
4. Enter the last four digits of your Social Security Number (xxxx) and Date of Birth (mm/dd/yyyy) as indicated and click Submit. You will be taken to the next sign-up page. 5. Create a [employer] ID. This will be your [employer] login ID and cannot be changed, so think of one that is secure and easy to remember. 6. Create a [employer] password. You can change your password at any time. 7. Enter your Password Reset Question and Answer. This can be used at a later time if you forget your password. 8. Enter your e-mail address. You will receive e-mail notification when new information is available in [address] 9. Click Sign Up. You can now view and download portions of your medical record. 10. Click the Download Summary menu link to download a portable copy of your medical information. If you have questions, please visit the Frequently Asked Questions section of the [employer] website. Remember, [employer] is NOT to be used for urgent needs. For medical emergencies, dial 911. Now available from your iPhone and Android! Please provide this summary of care documentation to your next provider. Your primary care clinician is listed as Sandee Humphries. If you have any questions after today's visit, please call [phone].

## 2018-05-08 ENCOUNTER — TELEPHONE (OUTPATIENT)
Dept: FAMILY MEDICINE CLINIC | Age: 73
End: 2018-05-08

## 2018-05-10 ENCOUNTER — CLINICAL SUPPORT (OUTPATIENT)
Dept: FAMILY MEDICINE CLINIC | Age: 73
End: 2018-05-10

## 2018-05-10 DIAGNOSIS — E78.5 HYPERLIPIDEMIA, UNSPECIFIED HYPERLIPIDEMIA TYPE: ICD-10-CM

## 2018-05-11 LAB
ALT SERPL-CCNC: 39 IU/L (ref 0–44)
AST SERPL-CCNC: 26 IU/L (ref 0–40)
CHOLEST SERPL-MCNC: 142 MG/DL (ref 100–199)
HDLC SERPL-MCNC: 28 MG/DL
INTERPRETATION, 910389: NORMAL
LDLC SERPL CALC-MCNC: 85 MG/DL (ref 0–99)
TRIGL SERPL-MCNC: 147 MG/DL (ref 0–149)
VLDLC SERPL CALC-MCNC: 29 MG/DL (ref 5–40)

## 2018-05-14 NOTE — PROGRESS NOTES
Lipids much improved on current regiment with normal LFTs  Patient to continue current medications and diet and keep planned follow-up

## 2018-10-22 ENCOUNTER — OFFICE VISIT (OUTPATIENT)
Dept: CARDIOLOGY CLINIC | Age: 73
End: 2018-10-22

## 2018-10-22 VITALS
SYSTOLIC BLOOD PRESSURE: 142 MMHG | HEIGHT: 68 IN | HEART RATE: 80 BPM | OXYGEN SATURATION: 96 % | WEIGHT: 229 LBS | RESPIRATION RATE: 18 BRPM | BODY MASS INDEX: 34.71 KG/M2 | DIASTOLIC BLOOD PRESSURE: 88 MMHG

## 2018-10-22 DIAGNOSIS — K21.9 GASTROESOPHAGEAL REFLUX DISEASE WITHOUT ESOPHAGITIS: ICD-10-CM

## 2018-10-22 DIAGNOSIS — R06.09 DOE (DYSPNEA ON EXERTION): Primary | ICD-10-CM

## 2018-10-22 DIAGNOSIS — E66.09 OBESITY DUE TO EXCESS CALORIES WITHOUT SERIOUS COMORBIDITY, UNSPECIFIED CLASSIFICATION: ICD-10-CM

## 2018-10-22 DIAGNOSIS — I10 ESSENTIAL HYPERTENSION: ICD-10-CM

## 2018-10-22 DIAGNOSIS — M15.9 GENERALIZED OSTEOARTHRITIS OF MULTIPLE SITES: ICD-10-CM

## 2018-10-22 DIAGNOSIS — R07.2 PRECORDIAL PAIN: ICD-10-CM

## 2018-10-22 DIAGNOSIS — E78.2 MIXED HYPERLIPIDEMIA: ICD-10-CM

## 2018-10-22 RX ORDER — LOSARTAN POTASSIUM 50 MG/1
50 TABLET ORAL DAILY
Qty: 90 TAB | Refills: 1 | Status: SHIPPED | OUTPATIENT
Start: 2018-10-22 | End: 2019-04-17 | Stop reason: SDUPTHER

## 2018-10-22 NOTE — PATIENT INSTRUCTIONS
Call to schedule an appointment with Dr. Jennifer Fuentes once your cardiac testing has been scheduled. 5 .

## 2018-10-22 NOTE — PROGRESS NOTES
Supa Bernard is a 68 y.o. male is here for cardiac evaluation--sx of dypsnea on exertion, chest tightness. Hx hypertension, borderline DM, GERD, mild obesity, dyslipidemia, DJD without known cardiac hx, followed by Dr Vicky Blanca. Sx of ROQUE, cough (past 3 mos), vague chest tightness. Cardiac cath 12 yrs ago \"ok'. No recent testing. Shoulder injury, stopped working 3 yrs ago. Sedentary. Former smoker quit 2009. +FH CAD. Seen last week by PCP, EKG with mild NST, labs as noted. On statin, ACEI, ASA, pepcid. The patient denies  orthopnea, PND, LE edema, palpitations, syncope, presyncope or fatigue.        Patient Active Problem List    Diagnosis Date Noted    Gastroesophageal reflux disease without esophagitis 10/17/2017    BMI 33.0-33.9,adult 09/13/2017    Hyperlipidemia 09/13/2017    Essential hypertension 09/13/2017    Lipoma of back 09/13/2017    Elevated blood pressure reading without diagnosis of hypertension 07/25/2017    Anterior chest wall pain 07/25/2017    Dyspnea on exertion 07/25/2017    Generalized osteoarthritis of multiple sites 12/31/2015    GERD (gastroesophageal reflux disease) 01/05/2015    BPH (benign prostatic hyperplasia) 01/05/2015      Kavitha Couch MD  Past Medical History:   Diagnosis Date    Arthritis     BPH (benign prostatic hyperplasia) 1/5/2015    Diabetes ([facility] [address]     borderline    Enlarged prostate     GERD (gastroesophageal reflux disease)     Hypertension     Ill-defined condition 2017    lt shoulder lipoma    Rotator cuff tear       Past Surgical History:   Procedure Laterality Date    HX CHOLECYSTECTOMY      HX CYST REMOVAL  10/10/2017    HX HERNIA REPAIR  2001    HX MOHS PROCEDURES      HX ORTHOPAEDIC  2015     lt  shoulder replacement    HX PREMALIG/BENIGN SKIN LESION EXCISION  10/10/2017    Excision left shoulder cyst- Bebeto Junior MD    HX ROTATOR CUFF REPAIR      rt shoulder     Allergies   Allergen Reactions    Pcn [Penicillins] Hives    Ultram [Tramadol] Nausea and Vomiting      Family History   Problem Relation Age of Onset    Cancer Mother     Heart Disease Father     Cancer Sister     Diabetes Brother       Social History     Socioeconomic History    Marital status:      Spouse name: Not on file    Number of children: Not on file    Years of education: Not on file    Highest education level: Not on file   Social Needs    Financial resource strain: Not on file    Food insecurity - worry: Not on file    Food insecurity - inability: Not on file   Incredible Labs needs - medical: Not on file   Incredible Labs needs - non-medical: Not on file   Occupational History    Not on file   Tobacco Use    Smoking status: Former Smoker     Last attempt to quit: 10/5/2009     Years since quittin.0    Smokeless tobacco: Never Used   Substance and Sexual Activity    Alcohol use: No     Alcohol/week: 0.0 oz    Drug use: No    Sexual activity: Not on file   Other Topics Concern    Not on file   Social History Narrative    Not on file      Current Outpatient Medications   Medication Sig    cetirizine (ZYRTEC) 10 mg tablet Take  by mouth.  pravastatin (PRAVACHOL) 40 mg tablet TAKE 1 TABLET BY MOUTH NIGHTLY    lisinopril (PRINIVIL, ZESTRIL) 20 mg tablet TAKE 1 TABLET BY MOUTH EVERY DAY    famotidine (PEPCID) 40 mg tablet TAKE 1 TABLET BY MOUTH EVERY DAY    aspirin delayed-release 81 mg tablet Take 1 Tab by mouth daily.  fluticasone (FLONASE) 50 mcg/actuation nasal spray 2 puffs each nostril daily    meloxicam (MOBIC) 7.5 mg tablet Take 1 Tab by mouth daily.  docusate sodium (COLACE) 100 mg capsule Take 1 Cap by mouth two (2) times a day.  loratadine (CLARITIN) 10 mg tablet Take 10 mg by mouth daily as needed for Allergies.  finasteride (PROSCAR) 5 mg tablet Take 5 mg by mouth daily.  doxazosin (CARDURA) 4 mg tablet Take 4 mg by mouth daily.      No current facility-administered medications for this visit. Review of Symptoms:    CONST  No weight change. No fever, chills, sweats    ENT No visual changes, URI sx, sore throat    CV  See HPI   RESP  No cough, or sputum, wheezing. Also see HPI   GI  No abdominal pain or change in bowel habits. No heartburn or dysphagia. No melena or rectal bleeding.   No dysuria, urgency, frequency, hematuria   MSKEL  No joint pain, swelling. No muscle pain. SKIN  No rash or lesions. NEURO  No headache, syncope, or seizure. No weakness, loss of sensation, or paresthesias. PSYCH  No low mood or depression  No anxiety. HE/LYMPH  No easy bruising, abnormal bleeding, or enlarged glands.         Physical ExamPhysical Exam:    Visit Vitals  /88 (BP 1 Location: Left arm, BP Patient Position: Sitting)   Pulse 80   Resp 18   Ht 5' 8\" (1.727 m)   Wt 229 lb (103.9 kg)   SpO2 96% Comment: ra   BMI 34.82 kg/m²     Gen: NAD  HEENT:  PERRL, throat clear  Neck: no adenopathy, no thyromegaly, no JVD   Heart:  Regular,Nl S1S2,  no murmur, gallop or rub.   Lungs:  clear  Abdomen:   Soft, non-tender, bowel sounds are active.   Extremities:  No edema  Pulse: symmetric  Neuro: A&O times 3, No focal neuro deficits    Cardiographics    ECG: NSR, NST (last week)      Labs:   Lab Results   Component Value Date/Time    Sodium 143 10/16/2018 02:43 PM    Sodium 143 12/19/2017 11:00 AM    Sodium 141 07/25/2017 07:49 AM    Sodium 141 10/07/2016 02:41 PM    Potassium 4.1 10/16/2018 02:43 PM    Potassium 4.9 12/19/2017 11:00 AM    Potassium 4.0 07/25/2017 07:49 AM    Potassium 4.0 10/07/2016 02:41 PM    Chloride 101 10/16/2018 02:43 PM    Chloride 100 12/19/2017 11:00 AM    Chloride 100 07/25/2017 07:49 AM    Chloride 101 10/07/2016 02:41 PM    CO2 23 10/16/2018 02:43 PM    CO2 26 12/19/2017 11:00 AM    CO2 24 07/25/2017 07:49 AM    CO2 26 10/07/2016 02:41 PM    Glucose 100 (H) 10/16/2018 02:43 PM    Glucose 106 (H) 12/19/2017 11:00 AM    Glucose 118 (H) 07/25/2017 07:49 AM    Glucose 91 10/07/2016 02:41 PM    BUN 16 10/16/2018 02:43 PM    BUN 19 12/19/2017 11:00 AM    BUN 18 07/25/2017 07:49 AM    BUN 15 10/07/2016 02:41 PM    Creatinine 0.91 10/16/2018 02:43 PM    Creatinine 0.88 12/19/2017 11:00 AM    Creatinine 0.96 07/25/2017 07:49 AM    Creatinine 0.84 10/07/2016 02:41 PM    BUN/Creatinine ratio 18 10/16/2018 02:43 PM    BUN/Creatinine ratio 22 12/19/2017 11:00 AM    BUN/Creatinine ratio 19 07/25/2017 07:49 AM    BUN/Creatinine ratio 18 10/07/2016 02:41 PM    GFR est AA 97 10/16/2018 02:43 PM    GFR est  12/19/2017 11:00 AM    GFR est AA 92 07/25/2017 07:49 AM    GFR est  10/07/2016 02:41 PM    GFR est non-AA 84 10/16/2018 02:43 PM    GFR est non-AA 86 12/19/2017 11:00 AM    GFR est non-AA 79 07/25/2017 07:49 AM    GFR est non-AA 89 10/07/2016 02:41 PM    Calcium 9.0 10/16/2018 02:43 PM    Calcium 8.9 12/19/2017 11:00 AM    Calcium 9.0 07/25/2017 07:49 AM    Calcium 8.9 10/07/2016 02:41 PM    Bilirubin, total 0.4 07/25/2017 07:49 AM    Bilirubin, total 0.4 10/07/2016 02:41 PM    AST (SGOT) 40 10/16/2018 02:43 PM    AST (SGOT) 26 05/10/2018 09:47 AM    AST (SGOT) 29 12/19/2017 11:00 AM    AST (SGOT) 20 07/25/2017 07:49 AM    AST (SGOT) 14 10/07/2016 02:41 PM    Alk. phosphatase 71 07/25/2017 07:49 AM    Alk.  phosphatase 76 10/07/2016 02:41 PM    Protein, total 6.4 07/25/2017 07:49 AM    Protein, total 6.0 10/07/2016 02:41 PM    Albumin 3.9 07/25/2017 07:49 AM    Albumin 3.9 10/07/2016 02:41 PM    A-G Ratio 1.6 07/25/2017 07:49 AM    A-G Ratio 1.9 10/07/2016 02:41 PM    ALT (SGPT) 50 (H) 10/16/2018 02:43 PM    ALT (SGPT) 39 05/10/2018 09:47 AM    ALT (SGPT) 38 12/19/2017 11:00 AM    ALT (SGPT) 35 07/25/2017 07:49 AM     No results found for: CPK, CPKX, CPX  Lab Results   Component Value Date/Time    Cholesterol, total 175 10/16/2018 02:43 PM    Cholesterol, total 142 05/10/2018 09:47 AM    Cholesterol, total 194 12/19/2017 11:00 AM    Cholesterol, total 178 07/25/2017 07:49 AM    Cholesterol, total 158 10/07/2016 02:41 PM    HDL Cholesterol 31 (L) 10/16/2018 02:43 PM    HDL Cholesterol 28 (L) 05/10/2018 09:47 AM    HDL Cholesterol 27 (L) 12/19/2017 11:00 AM    HDL Cholesterol 28 (L) 07/25/2017 07:49 AM    LDL, calculated 101 (H) 10/16/2018 02:43 PM    LDL, calculated 85 05/10/2018 09:47 AM    LDL, calculated 125 (H) 12/19/2017 11:00 AM    LDL, calculated 112 (H) 07/25/2017 07:49 AM    Triglyceride 213 (H) 10/16/2018 02:43 PM    Triglyceride 147 05/10/2018 09:47 AM    Triglyceride 212 (H) 12/19/2017 11:00 AM    Triglyceride 188 (H) 07/25/2017 07:49 AM     No results found for this or any previous visit. Assessment:         Patient Active Problem List    Diagnosis Date Noted    Gastroesophageal reflux disease without esophagitis 10/17/2017    BMI 33.0-33.9,adult 09/13/2017    Hyperlipidemia 09/13/2017    Essential hypertension 09/13/2017    Lipoma of back 09/13/2017    Elevated blood pressure reading without diagnosis of hypertension 07/25/2017    Anterior chest wall pain 07/25/2017    Dyspnea on exertion 07/25/2017    Generalized osteoarthritis of multiple sites 12/31/2015    GERD (gastroesophageal reflux disease) 01/05/2015    BPH (benign prostatic hyperplasia) 01/05/2015     68 y.o. male is here for cardiac evaluation--sx of dypsnea on exertion, chest tightness. Hx hypertension, borderline DM, GERD, mild obesity, dyslipidemia, DJD without known cardiac hx, followed by Dr Ivan Lee. Sx of ROQUE, cough (past 3 mos), vague chest tightness. Cardiac cath 12 yrs ago \"ok'. No recent testing. Shoulder injury, stopped working 3 yrs ago. Sedentary. Former smoker quit 2009. +FH CAD. Seen last week by PCP, EKG with mild NST, labs as noted. On statin, ACEI, ASA, pepcid.      Plan:     ROQUE, chest tightness/diaph, multiple CV risks--r/o CAD/ischemia  Hypertension  Pre-DM  Dyslipidemia  Obesity  R/o SHEYLA  Cough--persistent--r/o ACEI related  --Stress Cardiolyte  --Echo/doppler  --stop Lisinopril, start Losartan 50 gmqd  --continue ASA 81  --continue statin  --diet (gluc intolerance, obesity, elev TG)  --?sleep study  --f/u after testing to discuss, to ER if acute sx    Orville Muhammad MD

## 2018-10-22 NOTE — PROGRESS NOTES
Verified patient with two patient identifiers. Medications reviewed/approved by Dr. Alton Bradley. A verbal from Dr. Alton Bradley was given to remove any medications that were deleted during the visit. Medication(s) removed: none    Chief Complaint   Patient presents with    Shortness of Breath     New patient evaluation - referred by Dr. Robinson Esqueda - duration 3 months    Excessive Sweating     duration 3 months     1. Have you been to the ER, urgent care clinic since your last visit? Hospitalized since your last visit? no    2. Have you seen or consulted any other health care providers outside of the [address] [place] [address] since your last visit? Include any pap smears or colon screening. no    Walked the pt for 2 minutes.   BASELINE o2 saturation: 95%  Post walking for 2 minutes o2 saturation: 96%  Post 1 minute of rest o2 saturation: 95%

## 2018-10-30 ENCOUNTER — OFFICE VISIT (OUTPATIENT)
Dept: SURGERY | Age: 73
End: 2018-10-30

## 2018-10-30 VITALS
BODY MASS INDEX: 35.01 KG/M2 | HEIGHT: 68 IN | TEMPERATURE: 97.7 F | OXYGEN SATURATION: 95 % | SYSTOLIC BLOOD PRESSURE: 135 MMHG | DIASTOLIC BLOOD PRESSURE: 76 MMHG | WEIGHT: 231 LBS | HEART RATE: 103 BPM

## 2018-10-30 DIAGNOSIS — D17.1 LIPOMA OF BACK: Primary | ICD-10-CM

## 2018-10-30 NOTE — PROGRESS NOTES
Chief Complaint Patient presents with  Follow-up  
  s/p Excision of recurrent left posterior shoulder lipoma 10/10/17 1. Have you been to the ER, urgent care clinic since your last visit? Hospitalized since your last visit? No 
 
2. Have you seen or consulted any other health care providers outside of the [facility] since your last visit? Include any pap smears or colon screening.  No

## 2018-10-30 NOTE — PROGRESS NOTES
Patient is here for 6 months follow-up. Patient underwent excision of left posterior shoulder lipoma on 10/10/17. Path returned atypical lipoma. Denies any pain or recurrent lump over his left posterior shoulder. Of note, patient is currently being evaluated for CHF by his cardiologist. 
 
PE: 
Visit Vitals /76 (BP 1 Location: Right arm, BP Patient Position: Sitting) Pulse (!) 103 Temp 97.7 °F (36.5 °C) Ht 5' 8\" (1.727 m) Wt 104.8 kg (231 lb) SpO2 95% BMI 35.12 kg/m² Alert. NAD. Skin:  Left posterior shoulder Inc well healed. A 3 x 3 cm rubbery mass medial aspect of the incision; ?seroma vs recurrence. No erythema or skin changes overlying the lump. Bedside US performed and shows no fluid collection. Assessment: S/p excision of left posterior shoulder lipoma Atypical lipoma Recurrent lipoma Plan: 
-Excision of recurrent lipoma 
-Risks, benefit, and alternative to surgery was discussed with the patient. The risks of surgery include but not limited to infection, bleeding, recurrence, and the risks of anesthetic. Patient is agreeable to surgery but wants to wait until cardiac workup. All questions answered. -Instructed to call to schedule surgery after cardiac clearance.

## 2018-11-12 ENCOUNTER — TELEPHONE (OUTPATIENT)
Dept: CARDIOLOGY CLINIC | Age: 73
End: 2018-11-12

## 2018-11-12 NOTE — TELEPHONE ENCOUNTER
----- Message from Keeley Ni MD sent at 11/9/2018  1:03 PM EST -----  Regarding: stress MPI and Echo  Advise stress nuclear scan shows no blockages or ischemia, normal LV pumping function. Echo shows normal pumping function, valves ok, overall looks good. Thanks twidox Vinson      Spoke with the patient. Verified patient with two patient identifiers. Results given and questions answered. Patient verbalized understanding. Pt wishes to discuss results with Dr. Argentina Michaels and will call if needed.

## 2018-11-19 PROBLEM — E66.01 SEVERE OBESITY (HCC): Status: ACTIVE | Noted: 2018-11-19

## 2019-04-17 RX ORDER — LOSARTAN POTASSIUM 50 MG/1
50 TABLET ORAL DAILY
Qty: 90 TAB | Refills: 1 | Status: SHIPPED | OUTPATIENT
Start: 2019-04-17 | End: 2019-10-12 | Stop reason: SDUPTHER

## 2019-10-14 RX ORDER — LOSARTAN POTASSIUM 50 MG/1
50 TABLET ORAL DAILY
Qty: 90 TAB | Refills: 1 | Status: SHIPPED | OUTPATIENT
Start: 2019-10-14 | End: 2020-04-13

## 2019-12-09 ENCOUNTER — OFFICE VISIT (OUTPATIENT)
Dept: SURGERY | Age: 74
End: 2019-12-09

## 2019-12-09 VITALS
RESPIRATION RATE: 24 BRPM | WEIGHT: 243.2 LBS | HEIGHT: 68 IN | TEMPERATURE: 98 F | OXYGEN SATURATION: 92 % | HEART RATE: 98 BPM | DIASTOLIC BLOOD PRESSURE: 92 MMHG | SYSTOLIC BLOOD PRESSURE: 168 MMHG | BODY MASS INDEX: 36.86 KG/M2

## 2019-12-09 DIAGNOSIS — M25.812 MASS OF JOINT OF LEFT SHOULDER: Primary | ICD-10-CM

## 2019-12-09 NOTE — Clinical Note
12/29/19 Patient: Rey Duval YOB: 1945 Date of Visit: 12/9/2019 Gay Opitz, MD 
[address] [facility] [address] [id number] VIA In Basket Dear Gay Opitz, MD, Thank you for referring Mr. Mert Dias to [address] [place] [address] for evaluation. My notes for this consultation are attached. If you have questions, please do not hesitate to call me. I look forward to following your patient along with you.  
 
 
Sincerely, 
 
Kelly Clement MD

## 2019-12-12 ENCOUNTER — DOCUMENTATION ONLY (OUTPATIENT)
Dept: SURGERY | Age: 74
End: 2019-12-12

## 2019-12-12 DIAGNOSIS — Z01.818 PRE-OP TESTING: Primary | ICD-10-CM

## 2019-12-12 NOTE — PATIENT INSTRUCTIONS
Surgery Instruction Sheet You have been scheduled for surgery on Wednesday 01/08/2020 at 4:15 pm at [facility]. Please report to the Surgery Center at 2:15 pm, this is approximately 2 hours prior to your surgery time. The Surgery Center is located on the [facility] [address] side of the hospital, just next to the Emergency Room. Reserved parking is available and  parking if lot is full. You will not need to have a pre-op visit before surgery, but the Pre-op Nurse will call you before surgery. The Pre-op nurse will review your medical history, medications and give you additional instructions. They will also confirm your arrival time. Call your physician immediately if you notice a change in your health between the time you saw your physician and the day of surgery. If you take a blood thinner, please let us know. Call your ordering Doctor to make sure you can stop taking it prior to your surgery. STOP YOUR ASPIRIN 10 DAYS PRIOR TO SURGERY. DO NOT TAKE  IBUPROFEN, ADVIL, MOTRIN, ALEVE, EXCEDRIN, BC POWDER, GOODIES, FISH OIL OR ANY MEDICATION CONTAINING ASPIRIN 10 DAYS PRIOR TO YOUR SURGERY. MAY TAKE TYLENOL. Eat a light dinner the evening before your surgery. DO NOT EAT OR DRINK ANYTHING AFTER MIDNIGHT THE NIGHT BEFORE YOUR SURGERY. This includes water, chewing gum, lifesavers, etc.  The Pre op nurse will check with you about any medication that you may need to take the morning of surgery. Shower with a new bar of anti-bacterial soap (Dial, Safeguard) or solution given to you by Pre-op, the night before surgery. Do not use lotion, powder, deodorant on the skin after showering.   Wear loose, comfortable clothing the day of surgery and bring a container to store your contacts, eyeglasses, dentures, hearing aid, etc.  Do not bring money, valuables, jewelry, etc. to the hospital.   
 
If you are having outpatient surgery, someone must come with you the morning of surgery to drive you home. You can not drive for 24 hours after any anesthesia. Sometimes it is necessary to stay overnight and leave the next morning. This is still considered outpatient for most insurance deductibles. Someone will still need to drive you home. If you have questions or concerns, please feel free to call Dr Melodie Mott at [phone]. If you need to cancel your surgery, please call as soon as possible.

## 2019-12-29 NOTE — PROGRESS NOTES
To: Roxy Pennington MD    From: Celi Vega MD    Thank you for sending Nelly Barrera back to see us. Nice to see him again. Encounter Date: 12/9/2019    Subjective:      Sindhu Estrella is a 69 y.o. male presents for evaluation of left shoulder mass. Recently had a back mass. Objective:     Visit Vitals  BP (!) 168/92 (BP 1 Location: Left arm, BP Patient Position: Sitting)   Pulse 98   Temp 98 °F (36.7 °C)   Resp 24   Ht 5' 8\" (1.727 m)   Wt 110.3 kg (243 lb 3.2 oz)   SpO2 92%   BMI 36.98 kg/m²       General:  alert, cooperative, no distress, appears stated age   Torso: Left upper back mass near prior scar. Assessment:     Recurrent left upper back mass. Previously excised - path showed atypical lipoma. Plan: Will need wider excision.     Will send for MRI prior due to the worrisome pathology in the past.  Liposarcoma is in the differential.    Celi Vega MD

## 2019-12-30 ENCOUNTER — TELEPHONE (OUTPATIENT)
Dept: SURGERY | Age: 74
End: 2019-12-30

## 2019-12-30 NOTE — PERIOP NOTES
[facility]  Preoperative Instructions        Surgery Date 1/8/20          Time of Arrival 2:15  [phone]    1. On the day of your surgery, please report to the Surgical Services Registration Desk and sign in at your designated time. The Surgery Center is located to the right of the Emergency Room. 2. You must have someone with you to drive you home. You should not drive a car for 24 hours following surgery. Please make arrangements for a friend or family member to stay with you for the first 24 hours after your surgery. 3. Do not have anything to eat or drink (including water, gum, mints, coffee, juice) after midnight 1/7/20 . ? This may not apply to medications prescribed by your physician. ?(Please note below the special instructions with medications to take the morning of your procedure.)    4. We recommend you do not drink any alcoholic beverages for 24 hours before and after your surgery. 5. Contact your surgeons office for instructions on the following medications: non-steroidal anti-inflammatory drugs (i.e. Advil, Aleve), vitamins, and supplements. (Some surgeons will want you to stop these medications prior to surgery and others may allow you to take them)  **If you are currently taking Plavix, Coumadin, Aspirin and/or other blood-thinning agents, contact your surgeon for instructions. ** Your surgeon will partner with the physician prescribing these medications to determine if it is safe to stop or if you need to continue taking. Please do not stop taking these medications without instructions from your surgeon    6. Wear comfortable clothes. Wear glasses instead of contacts. Do not bring any money or jewelry. Please bring picture ID, insurance card, and any prearranged co-payment or hospital payment. Do not wear make-up, particularly mascara the morning of your surgery. Do not wear nail polish, particularly if you are having foot /hand surgery.   Wear your hair loose or down, no ponytails, buns, farheen pins or clips. All body piercings must be removed. Please shower with antibacterial soap for three consecutive days before and on the morning of surgery, but do not apply any lotions, powders or deodorants after the shower on the day of surgery. Please use a fresh towels after each shower. Please sleep in clean clothes and change bed linens the night before surgery. Please do not shave for 48 hours prior to surgery. Shaving of the face is acceptable. 7. You should understand that if you do not follow these instructions your surgery may be cancelled. If your physical condition changes (I.e. fever, cold or flu) please contact your surgeon as soon as possible. 8. It is important that you be on time. If a situation occurs where you may be late, please call [phone] (OR Holding Area). 9. If you have any questions and or problems, please call [phone] (Pre-admission Testing). 10. Your surgery time may be subject to change. You will receive a phone call the evening prior if your time changes. 11.  If having outpatient surgery, you must have someone to drive you here, stay with you during the duration of your stay, and to drive you home at time of discharge. 12.   In an effort to improve the efficiency, privacy, and safety for all of our Pre-op patients visitors are not allowed in the Holding area. Once you arrive and are registered your family/visitors will be asked to remain in the waiting room. The Pre-op staff will get you from the Surgical Waiting Area and will explain to you and your family/visitors that the Pre-op phase is beginning. The staff will answer any questions and provide instructions for tracking of the patient, by use of the existing tracking number and color-coded status board in the waiting room.   At this time the staff will also ask for your designated spokesperson information in the event that the physician or staff need to provide an update or obtain any pertinent information. The designated spokesperson will be notified if the physician needs to speak to family during the pre-operative phase. If at any time your family/visitors has questions or concerns they may approach the volunteer desk in the waiting area for assistance. Special Instructions: none     MEDICATIONS TO TAKE THE MORNING OF SURGERY WITH A SIP OF WATER: zyrtec, cardura, pepcid, proscar and  hydrochlorothiazide      I understand a pre-operative phone call will be made to verify my surgery time. In the event that I am not available, I give permission for a message to be left on my answering service and/or with another person?   yes         ___________________      __________   _________    (Signature of Patient)             (Witness)                (Date and Time)

## 2020-01-08 ENCOUNTER — ANESTHESIA EVENT (OUTPATIENT)
Dept: SURGERY | Age: 75
End: 2020-01-08
Payer: MEDICARE

## 2020-01-08 ENCOUNTER — ANESTHESIA (OUTPATIENT)
Dept: SURGERY | Age: 75
End: 2020-01-08
Payer: MEDICARE

## 2020-01-08 ENCOUNTER — HOSPITAL ENCOUNTER (OUTPATIENT)
Age: 75
Setting detail: OUTPATIENT SURGERY
Discharge: HOME | End: 2020-01-08
Attending: SURGERY | Admitting: SURGERY
Payer: MEDICARE

## 2020-01-08 VITALS
TEMPERATURE: 98.2 F | RESPIRATION RATE: 20 BRPM | HEART RATE: 98 BPM | SYSTOLIC BLOOD PRESSURE: 152 MMHG | OXYGEN SATURATION: 95 % | BODY MASS INDEX: 36.09 KG/M2 | DIASTOLIC BLOOD PRESSURE: 76 MMHG | WEIGHT: 238.1 LBS | HEIGHT: 68 IN

## 2020-01-08 DIAGNOSIS — D17.1 LIPOMA OF BACK: Primary | ICD-10-CM

## 2020-01-08 LAB
GLUCOSE BLD STRIP.AUTO-MCNC: 117 MG/DL (ref 65–100)
GLUCOSE BLD STRIP.AUTO-MCNC: 139 MG/DL (ref 65–100)
SERVICE CMNT-IMP: ABNORMAL
SERVICE CMNT-IMP: ABNORMAL

## 2020-01-08 PROCEDURE — 77030002933 HC SUT MCRYL J&J -A: Performed by: SURGERY

## 2020-01-08 PROCEDURE — 77030040922 HC BLNKT HYPOTHRM STRY -A

## 2020-01-08 PROCEDURE — 74011250636 HC RX REV CODE- 250/636: Performed by: NURSE ANESTHETIST, CERTIFIED REGISTERED

## 2020-01-08 PROCEDURE — 76210000006 HC OR PH I REC 0.5 TO 1 HR: Performed by: SURGERY

## 2020-01-08 PROCEDURE — 74011250636 HC RX REV CODE- 250/636: Performed by: ANESTHESIOLOGY

## 2020-01-08 PROCEDURE — 82962 GLUCOSE BLOOD TEST: CPT

## 2020-01-08 PROCEDURE — 88305 TISSUE EXAM BY PATHOLOGIST: CPT

## 2020-01-08 PROCEDURE — 88304 TISSUE EXAM BY PATHOLOGIST: CPT

## 2020-01-08 PROCEDURE — 76060000035 HC ANESTHESIA 2 TO 2.5 HR: Performed by: SURGERY

## 2020-01-08 PROCEDURE — 77030018836 HC SOL IRR NACL ICUM -A: Performed by: SURGERY

## 2020-01-08 PROCEDURE — 77030027138 HC INCENT SPIROMETER -A

## 2020-01-08 PROCEDURE — 88377 M/PHMTRC ALYS ISHQUANT/SEMIQ: CPT

## 2020-01-08 PROCEDURE — 77030008684 HC TU ET CUF COVD -B: Performed by: ANESTHESIOLOGY

## 2020-01-08 PROCEDURE — 76010000131 HC OR TIME 2 TO 2.5 HR: Performed by: SURGERY

## 2020-01-08 PROCEDURE — 77030012893

## 2020-01-08 PROCEDURE — 74011000250 HC RX REV CODE- 250: Performed by: NURSE ANESTHETIST, CERTIFIED REGISTERED

## 2020-01-08 PROCEDURE — 77030019908 HC STETH ESOPH SIMS -A: Performed by: ANESTHESIOLOGY

## 2020-01-08 PROCEDURE — 77030031139 HC SUT VCRL2 J&J -A: Performed by: SURGERY

## 2020-01-08 PROCEDURE — 74011250637 HC RX REV CODE- 250/637: Performed by: NURSE ANESTHETIST, CERTIFIED REGISTERED

## 2020-01-08 PROCEDURE — 77030040361 HC SLV COMPR DVT MDII -B: Performed by: SURGERY

## 2020-01-08 PROCEDURE — 77030021678 HC GLIDESCP STAT DISP VERT -B: Performed by: ANESTHESIOLOGY

## 2020-01-08 PROCEDURE — 74011000250 HC RX REV CODE- 250: Performed by: SURGERY

## 2020-01-08 PROCEDURE — 74011250636 HC RX REV CODE- 250/636: Performed by: SURGERY

## 2020-01-08 PROCEDURE — 88307 TISSUE EXAM BY PATHOLOGIST: CPT

## 2020-01-08 PROCEDURE — 76210000020 HC REC RM PH II FIRST 0.5 HR: Performed by: SURGERY

## 2020-01-08 PROCEDURE — 77030026438 HC STYL ET INTUB CARD -A: Performed by: ANESTHESIOLOGY

## 2020-01-08 PROCEDURE — 94002 VENT MGMT INPAT INIT DAY: CPT

## 2020-01-08 PROCEDURE — 74011250637 HC RX REV CODE- 250/637: Performed by: SURGERY

## 2020-01-08 RX ORDER — BUPIVACAINE HYDROCHLORIDE 5 MG/ML
INJECTION, SOLUTION EPIDURAL; INTRACAUDAL AS NEEDED
Status: DISCONTINUED | OUTPATIENT
Start: 2020-01-08 | End: 2020-01-08 | Stop reason: HOSPADM

## 2020-01-08 RX ORDER — NALOXONE HYDROCHLORIDE 0.4 MG/ML
INJECTION, SOLUTION INTRAMUSCULAR; INTRAVENOUS; SUBCUTANEOUS
Status: DISCONTINUED
Start: 2020-01-08 | End: 2020-01-08 | Stop reason: HOSPADM

## 2020-01-08 RX ORDER — NALOXONE HYDROCHLORIDE 0.4 MG/ML
0.2 INJECTION, SOLUTION INTRAMUSCULAR; INTRAVENOUS; SUBCUTANEOUS
Status: COMPLETED | OUTPATIENT
Start: 2020-01-08 | End: 2020-01-08

## 2020-01-08 RX ORDER — POLYETHYLENE GLYCOL 3350 17 G/17G
17 POWDER, FOR SOLUTION ORAL DAILY
Qty: 510 G | Refills: 0 | Status: SHIPPED | OUTPATIENT
Start: 2020-01-08 | End: 2021-03-18

## 2020-01-08 RX ORDER — LIDOCAINE HYDROCHLORIDE 10 MG/ML
0.1 INJECTION, SOLUTION EPIDURAL; INFILTRATION; INTRACAUDAL; PERINEURAL AS NEEDED
Status: DISCONTINUED | OUTPATIENT
Start: 2020-01-08 | End: 2020-01-08 | Stop reason: HOSPADM

## 2020-01-08 RX ORDER — SODIUM CHLORIDE, SODIUM LACTATE, POTASSIUM CHLORIDE, CALCIUM CHLORIDE 600; 310; 30; 20 MG/100ML; MG/100ML; MG/100ML; MG/100ML
25 INJECTION, SOLUTION INTRAVENOUS CONTINUOUS
Status: DISCONTINUED | OUTPATIENT
Start: 2020-01-08 | End: 2020-01-08 | Stop reason: HOSPADM

## 2020-01-08 RX ORDER — ALBUTEROL SULFATE 90 UG/1
AEROSOL, METERED RESPIRATORY (INHALATION) AS NEEDED
Status: DISCONTINUED | OUTPATIENT
Start: 2020-01-08 | End: 2020-01-08 | Stop reason: HOSPADM

## 2020-01-08 RX ORDER — LIDOCAINE HYDROCHLORIDE 20 MG/ML
INJECTION, SOLUTION EPIDURAL; INFILTRATION; INTRACAUDAL; PERINEURAL AS NEEDED
Status: DISCONTINUED | OUTPATIENT
Start: 2020-01-08 | End: 2020-01-08 | Stop reason: HOSPADM

## 2020-01-08 RX ORDER — FENTANYL CITRATE 50 UG/ML
INJECTION, SOLUTION INTRAMUSCULAR; INTRAVENOUS AS NEEDED
Status: DISCONTINUED | OUTPATIENT
Start: 2020-01-08 | End: 2020-01-08 | Stop reason: HOSPADM

## 2020-01-08 RX ORDER — ESMOLOL HYDROCHLORIDE 10 MG/ML
INJECTION INTRAVENOUS AS NEEDED
Status: DISCONTINUED | OUTPATIENT
Start: 2020-01-08 | End: 2020-01-08 | Stop reason: HOSPADM

## 2020-01-08 RX ORDER — ONDANSETRON 2 MG/ML
INJECTION INTRAMUSCULAR; INTRAVENOUS AS NEEDED
Status: DISCONTINUED | OUTPATIENT
Start: 2020-01-08 | End: 2020-01-08 | Stop reason: HOSPADM

## 2020-01-08 RX ORDER — PROPOFOL 10 MG/ML
INJECTION, EMULSION INTRAVENOUS
Status: DISCONTINUED | OUTPATIENT
Start: 2020-01-08 | End: 2020-01-08 | Stop reason: HOSPADM

## 2020-01-08 RX ORDER — DIPHENHYDRAMINE HYDROCHLORIDE 50 MG/ML
12.5 INJECTION, SOLUTION INTRAMUSCULAR; INTRAVENOUS AS NEEDED
Status: DISCONTINUED | OUTPATIENT
Start: 2020-01-08 | End: 2020-01-08 | Stop reason: HOSPADM

## 2020-01-08 RX ORDER — SODIUM CHLORIDE 0.9 % (FLUSH) 0.9 %
5-40 SYRINGE (ML) INJECTION AS NEEDED
Status: DISCONTINUED | OUTPATIENT
Start: 2020-01-08 | End: 2020-01-08 | Stop reason: HOSPADM

## 2020-01-08 RX ORDER — PHENYLEPHRINE HCL IN 0.9% NACL 0.4MG/10ML
SYRINGE (ML) INTRAVENOUS AS NEEDED
Status: DISCONTINUED | OUTPATIENT
Start: 2020-01-08 | End: 2020-01-08 | Stop reason: HOSPADM

## 2020-01-08 RX ORDER — HYDROMORPHONE HYDROCHLORIDE 1 MG/ML
0.2 INJECTION, SOLUTION INTRAMUSCULAR; INTRAVENOUS; SUBCUTANEOUS
Status: DISCONTINUED | OUTPATIENT
Start: 2020-01-08 | End: 2020-01-08 | Stop reason: HOSPADM

## 2020-01-08 RX ORDER — SUCCINYLCHOLINE CHLORIDE 20 MG/ML
INJECTION INTRAMUSCULAR; INTRAVENOUS AS NEEDED
Status: DISCONTINUED | OUTPATIENT
Start: 2020-01-08 | End: 2020-01-08 | Stop reason: HOSPADM

## 2020-01-08 RX ORDER — ROCURONIUM BROMIDE 10 MG/ML
INJECTION, SOLUTION INTRAVENOUS AS NEEDED
Status: DISCONTINUED | OUTPATIENT
Start: 2020-01-08 | End: 2020-01-08 | Stop reason: HOSPADM

## 2020-01-08 RX ORDER — PROPOFOL 10 MG/ML
INJECTION, EMULSION INTRAVENOUS AS NEEDED
Status: DISCONTINUED | OUTPATIENT
Start: 2020-01-08 | End: 2020-01-08 | Stop reason: HOSPADM

## 2020-01-08 RX ORDER — DEXAMETHASONE SODIUM PHOSPHATE 4 MG/ML
INJECTION, SOLUTION INTRA-ARTICULAR; INTRALESIONAL; INTRAMUSCULAR; INTRAVENOUS; SOFT TISSUE AS NEEDED
Status: DISCONTINUED | OUTPATIENT
Start: 2020-01-08 | End: 2020-01-08 | Stop reason: HOSPADM

## 2020-01-08 RX ORDER — SODIUM CHLORIDE 0.9 % (FLUSH) 0.9 %
5-40 SYRINGE (ML) INJECTION EVERY 8 HOURS
Status: DISCONTINUED | OUTPATIENT
Start: 2020-01-08 | End: 2020-01-08 | Stop reason: HOSPADM

## 2020-01-08 RX ORDER — FENTANYL CITRATE 50 UG/ML
25 INJECTION, SOLUTION INTRAMUSCULAR; INTRAVENOUS
Status: DISCONTINUED | OUTPATIENT
Start: 2020-01-08 | End: 2020-01-08 | Stop reason: HOSPADM

## 2020-01-08 RX ORDER — EPHEDRINE SULFATE/0.9% NACL/PF 50 MG/5 ML
SYRINGE (ML) INTRAVENOUS AS NEEDED
Status: DISCONTINUED | OUTPATIENT
Start: 2020-01-08 | End: 2020-01-08 | Stop reason: HOSPADM

## 2020-01-08 RX ORDER — HYDROCODONE BITARTRATE AND ACETAMINOPHEN 5; 325 MG/1; MG/1
1-2 TABLET ORAL
Qty: 30 TAB | Refills: 0 | Status: SHIPPED | OUTPATIENT
Start: 2020-01-08 | End: 2020-01-13

## 2020-01-08 RX ADMIN — PROPOFOL 10 MG: 10 INJECTION, EMULSION INTRAVENOUS at 15:53

## 2020-01-08 RX ADMIN — SODIUM CHLORIDE, SODIUM LACTATE, POTASSIUM CHLORIDE, AND CALCIUM CHLORIDE: 600; 310; 30; 20 INJECTION, SOLUTION INTRAVENOUS at 17:03

## 2020-01-08 RX ADMIN — VANCOMYCIN HYDROCHLORIDE 1500 MG: 10 INJECTION, POWDER, LYOPHILIZED, FOR SOLUTION INTRAVENOUS at 14:10

## 2020-01-08 RX ADMIN — NALOXONE HYDROCHLORIDE 0.2 MG: 0.4 INJECTION, SOLUTION INTRAMUSCULAR; INTRAVENOUS; SUBCUTANEOUS at 17:23

## 2020-01-08 RX ADMIN — LIDOCAINE HYDROCHLORIDE 100 MG: 20 INJECTION, SOLUTION INTRAVENOUS at 15:20

## 2020-01-08 RX ADMIN — Medication 80 MCG: at 15:58

## 2020-01-08 RX ADMIN — Medication 10 MG: at 15:55

## 2020-01-08 RX ADMIN — PROPOFOL 30 MG: 10 INJECTION, EMULSION INTRAVENOUS at 16:54

## 2020-01-08 RX ADMIN — Medication 5 MG: at 15:37

## 2020-01-08 RX ADMIN — SUCCINYLCHOLINE CHLORIDE 160 MG: 20 INJECTION, SOLUTION INTRAMUSCULAR; INTRAVENOUS at 15:20

## 2020-01-08 RX ADMIN — Medication 5 MG: at 15:34

## 2020-01-08 RX ADMIN — ROCURONIUM BROMIDE 5 MG: 10 INJECTION INTRAVENOUS at 15:20

## 2020-01-08 RX ADMIN — FENTANYL CITRATE 25 MCG: 50 INJECTION, SOLUTION INTRAMUSCULAR; INTRAVENOUS at 15:23

## 2020-01-08 RX ADMIN — Medication 3 AMPULE: at 14:22

## 2020-01-08 RX ADMIN — Medication 40 MCG: at 15:37

## 2020-01-08 RX ADMIN — ESMOLOL HYDROCHLORIDE 10 MG: 10 INJECTION, SOLUTION INTRAVENOUS at 16:50

## 2020-01-08 RX ADMIN — PROPOFOL 50 MCG/KG/MIN: 10 INJECTION, EMULSION INTRAVENOUS at 16:54

## 2020-01-08 RX ADMIN — FENTANYL CITRATE 25 MCG: 50 INJECTION, SOLUTION INTRAMUSCULAR; INTRAVENOUS at 15:20

## 2020-01-08 RX ADMIN — ONDANSETRON HYDROCHLORIDE 4 MG: 2 INJECTION, SOLUTION INTRAMUSCULAR; INTRAVENOUS at 15:31

## 2020-01-08 RX ADMIN — ROCURONIUM BROMIDE 15 MG: 10 INJECTION INTRAVENOUS at 15:23

## 2020-01-08 RX ADMIN — DEXAMETHASONE SODIUM PHOSPHATE 8 MG: 4 INJECTION, SOLUTION INTRAMUSCULAR; INTRAVENOUS at 15:31

## 2020-01-08 RX ADMIN — Medication 10 MG: at 16:14

## 2020-01-08 RX ADMIN — ROCURONIUM BROMIDE 20 MG: 10 INJECTION INTRAVENOUS at 15:43

## 2020-01-08 RX ADMIN — PROPOFOL 20 MG: 10 INJECTION, EMULSION INTRAVENOUS at 15:23

## 2020-01-08 RX ADMIN — PROPOFOL 100 MG: 10 INJECTION, EMULSION INTRAVENOUS at 15:20

## 2020-01-08 RX ADMIN — Medication 40 MCG: at 16:14

## 2020-01-08 RX ADMIN — FENTANYL CITRATE 50 MCG: 50 INJECTION, SOLUTION INTRAMUSCULAR; INTRAVENOUS at 15:43

## 2020-01-08 RX ADMIN — SUGAMMADEX 200 MG: 100 INJECTION, SOLUTION INTRAVENOUS at 16:35

## 2020-01-08 RX ADMIN — Medication 40 MCG: at 15:55

## 2020-01-08 RX ADMIN — ALBUTEROL SULFATE 3 PUFF: 90 AEROSOL, METERED RESPIRATORY (INHALATION) at 16:38

## 2020-01-08 RX ADMIN — Medication 40 MCG: at 15:34

## 2020-01-08 RX ADMIN — ROCURONIUM BROMIDE 10 MG: 10 INJECTION INTRAVENOUS at 15:25

## 2020-01-08 RX ADMIN — SODIUM CHLORIDE, SODIUM LACTATE, POTASSIUM CHLORIDE, AND CALCIUM CHLORIDE 25 ML/HR: 600; 310; 30; 20 INJECTION, SOLUTION INTRAVENOUS at 14:00

## 2020-01-08 RX ADMIN — ESMOLOL HYDROCHLORIDE 10 MG: 10 INJECTION, SOLUTION INTRAVENOUS at 16:44

## 2020-01-08 NOTE — ANESTHESIA PREPROCEDURE EVALUATION
Anesthetic History   No history of anesthetic complications            Review of Systems / Medical History  Patient summary reviewed, nursing notes reviewed and pertinent labs reviewed    Pulmonary  Within defined limits                 Neuro/Psych   Within defined limits           Cardiovascular    Hypertension          CAD (? hx of stent placement)    Exercise tolerance: >4 METS     GI/Hepatic/Renal     GERD      Liver disease     Endo/Other    Diabetes    Obesity and arthritis     Other Findings   Comments: Recurrent back lipoma            Physical Exam    Airway  Mallampati: III  TM Distance: 4 - 6 cm  Neck ROM: normal range of motion   Mouth opening: Diminished (comment)     Cardiovascular  Regular rate and rhythm,  S1 and S2 normal,  no murmur, click, rub, or gallop             Dental    Dentition: Poor dentition     Pulmonary  Breath sounds clear to auscultation               Abdominal  GI exam deferred       Other Findings            Anesthetic Plan    ASA: 3  Anesthesia type: general    Monitoring Plan: BIS      Induction: Intravenous  Anesthetic plan and risks discussed with: Patient

## 2020-01-08 NOTE — PROGRESS NOTES
Responded well to narcan. Now extubated and sat in mid 90's on room air. Anesthesia feels he is now safe for d/c and I agree. Discussed with his wife. Recommended avoiding narcotic pain reliever tonight and using ice and his regular diclofenac instead.

## 2020-01-08 NOTE — PERIOP NOTES
Handoff Report from Operating Room to PACU    Report received from Lisa Ford RN  and Renee Manzanares CRNA regarding Chanel Em. .      Surgeon(s):  Minesh Leonardo MD  And Procedure(s) (LRB):  EXCISION OF LEFT UPPER BACK LIPOMA (Left)  confirmed   with allergies and dressings discussed. Anesthesia type, drugs, patient history, complications, estimated blood loss, vital signs, intake and output, and last pain medication were reviewed.

## 2020-01-08 NOTE — BRIEF OP NOTE
[id number]    BRIEF OPERATIVE NOTE    Date of Procedure: 1/8/2020   Preoperative Diagnosis: RECURRENT LEFT UPPER BACK LIPOMA  Postoperative Diagnosis: RECURRENT LEFT UPPER BACK LIPOMA    Procedure(s):  EXCISION OF LEFT UPPER BACK LIPOMA  Surgeon(s) and Role:     * Gertrudis Servin MD - Primary         Surgical Assistant: none    Surgical Staff:  Circ-1: Garo Binghamub Tech-1: Carla Ramirez  Event Time In Time Out   Incision Start 1539    Incision Close 1630      Anesthesia: General   Estimated Blood Loss: min  Specimens:   ID Type Source Tests Collected by Time Destination   1 : LEFT UPPER BACK SUBCUTANEOUS ADIPOSE TISSUE Preservative Back  Gertrudis Servin MD 1/8/2020 1552 Pathology   2 : LEFT UPPER BACK INTRAMUSCULAR MASS Preservative Back  Gertrudis Servin MD 1/8/2020 1613 Pathology      Findings: intramusc mass   Complications: none immediate  Implants: * No implants in log *    [id number]

## 2020-01-08 NOTE — H&P
Day of Surgery H&P    Assessment:   RECURRENT BACK LIPOMA    Body mass index is 36.2 kg/m². Plan:   EXCISION OF BACK LIPOMA (N/A ) Discussed the risk of surgery including bleeding, infection, injury to adjacent structures, and the risks of general anesthetic. The patient understands the risks; any and all questions were answered to the patient's satisfaction. Subjective:      La Cleary is a 69 y.o. male who presents for above procedure. Objective:   Blood pressure (!) 180/96, pulse 79, temperature 97.9 °F (36.6 °C), resp. rate 19, height 5' 8\" (1.727 m), weight 108 kg (238 lb 1.6 oz), SpO2 93 %.   Temp (24hrs), Av.9 °F (36.6 °C), Min:97.9 °F (36.6 °C), Max:97.9 °F (36.6 °C)      Physical Exam:  PHYSICAL EXAM:    Chest:   [x]   CTA bialterally, no wheezing/rhonchi/rales/crackles    []   wheezing     []   rhonchi     []   crackles     []   use of accessory muscles    Heart:  [x]   regular rate and rhythm     [x]   No murmurs/rubs/gallops    []   irregular rhythm     []   Murmur     []   Rubs     []   Gallops    Back mass     Past Medical History:   Diagnosis Date    Arthritis     BPH (benign prostatic hyperplasia) 2015    Diabetes ([facility] [address] [address]     borderline    Enlarged prostate     Fatty liver     GERD (gastroesophageal reflux disease)     Hypertension     Ill-defined condition     lt shoulder lipoma    Lipoma     Lipoma of back 2018    left shoulderblade area    Rotator cuff tear      Past Surgical History:   Procedure Laterality Date    HX CHOLECYSTECTOMY      HX CYST REMOVAL  10/10/2017    HX HERNIA REPAIR      HX MOHS PROCEDURES      HX ORTHOPAEDIC       lt  shoulder replacement    HX PREMALIG/BENIGN SKIN LESION EXCISION  10/10/2017    Excision left shoulder cyst- Eyal West MD    HX ROTATOR CUFF REPAIR      rt shoulder      Family History   Problem Relation Age of Onset    Cancer Mother         bone & gland    Heart Disease Father     Cancer Sister breast & ovarian    Heart Disease Sister     Diabetes Brother     Coronary Artery Disease Brother     Cancer Niece         bone & brain -  age 43     Social History     Socioeconomic History    Marital status:      Spouse name: Not on file    Number of children: Not on file    Years of education: Not on file    Highest education level: Not on file   Tobacco Use    Smoking status: Former Smoker     Last attempt to quit: 10/5/2009     Years since quitting: 10.2    Smokeless tobacco: Never Used   Substance and Sexual Activity    Alcohol use: No     Alcohol/week: 0.0 standard drinks    Drug use: No    Sexual activity: Yes     Partners: Female      Prior to Admission medications    Medication Sig Start Date End Date Taking? Authorizing Provider   pravastatin (PRAVACHOL) 40 mg tablet Take 1 Tab by mouth nightly. 19  Yes Jessica Don MD   hydroCHLOROthiazide (HYDRODIURIL) 25 mg tablet TAKE 1 TABLET BY MOUTH EVERY DAY 19  Yes Teri Don MD   diclofenac EC (VOLTAREN) 75 mg EC tablet Take one a day if needed for arthritis 19  Yes Jessica Don MD   losartan (COZAAR) 50 mg tablet TAKE 1 TAB BY MOUTH DAILY. REPLACES LISINOPRIL 10/14/19  Yes Librado Guzmna MD   cetirizine (ZYRTEC) 10 mg tablet Take  by mouth. Yes Provider, Historical   famotidine (PEPCID) 40 mg tablet TAKE 1 TABLET BY MOUTH EVERY DAY 10/16/18  Yes Eva Blair MD   loratadine (CLARITIN) 10 mg tablet Take 10 mg by mouth daily as needed for Allergies. Yes Provider, Historical   finasteride (PROSCAR) 5 mg tablet Take 5 mg by mouth daily. Yes Provider, Historical   doxazosin (CARDURA) 4 mg tablet Take 4 mg by mouth daily.    Yes Provider, Historical     ALLERGIES:    Allergies   Allergen Reactions    Lisinopril Cough    Pcn [Penicillins] Hives    Tramadol Nausea and Vomiting     Other reaction(s): Nausea and Vomiting       Review of Systems:    See prior consult, office note or scanned list in media section. 10 systems negative except as specified.                 Signed By: Liz Obrien MD     January 8, 2020

## 2020-01-08 NOTE — DISCHARGE INSTRUCTIONS
Discharge Instructions:  Dr. Miranda Grady    Call on next business day to arrange appointment for follow up in 3 week(s) -- [phone]. Activity:  Walk regularly starting immediately. No lifting more than 10 -15 pounds for 3 week(s). You may resume driving in 3 days unless still requiring narcotics for pain. Diet:  You may resume normal diet. Wound Care:  Dermabond glue dressing will fall off on its own. If you experience a lot of drainage, develop redness around the wound, or a fever over 101 F occurs please call the office. You may shower. No baths or swimming for 2 weeks. Medications:  Resume home medications as indicated on the Medical Reconciliation form. Do not use blood thinners (such as Aspirin, Coumadin, or Plavix) until 3 days after surgery. Pain medications:  Non steroidal antiinflammatories such as your diclofenac seem to work best for post surgical pain. Try these first as prescribed. A narcotic prescription will also be given for breakthrough pain. PUT ICE ON YOUR INCISIONS 20 MINUTES EVERY HOUR WHILE THEY REMAIN SORE. PLACE A CLOTH BETWEEN YOUR SKIN THE THE ICE BAG. Colace or Miralax should be used twice daily to prevent constipation while on narcotics. If you are still having trouble having a BM after 1-2 days, try milk of magnesia. If this does not work within 24 hours, try a bottle of magnesium citrate. Narcotics and anesthesia sometimes cause nausea and vomiting. If persistent please call the office. Do not hesitate to call with questions or concerns. Sharon Moseley MD  Tel [phone]  Fax [phone]  TO PREVENT AN INFECTION      1. [address] YOUR HANDS     To prevent infection, good handwashing is the most important thing you or your caregiver can do.  Wash your hands with soap and water or use the hand  we gave you before you touch any wounds. 2. SHOWER     Use the antibacterial soap we gave you when you take a shower.  Shower with this soap until your wounds are healed.  To reach all areas of your body, you may need someone to help you.  Dont forget to clean your belly button with every shower. 3.  USE CLEAN SHEETS     Use freshly cleaned sheets on your bed after surgery.  To keep the surgery site clean, do not allow pets to sleep with you while your wound is still healing. 4. STOP SMOKING     Stop smoking, or at least cut back on smoking     Smoking slows your healing. 5.  CONTROL YOUR BLOOD SUGAR     High blood sugars slow wound healing.  If you are diabetic, control your blood sugar levels before and after your surgery. How to Care for Your Wound After Its Treated With  DERMABOND* Topical Skin Adhesive  DERMABOND* Topical Skin Adhesive (2-octyl cyanoacrylate) is a sterile, liquid skin adhesive  that holds wound edges together. The film will usually remain in place for 5 to 10 days, then  naturally fall off your skin. The following will answer some of your questions and provide instructions for proper care for your  wound while it is healing:    CHECK WOUND APPEARANCE   Some swelling, redness, and pain are common with all wounds and normally will go away as the  wound heals. If swelling, redness, or pain increases or if the wound feels warm to the touch,  contact a doctor. Also contact a doctor if the wound edges reopen or separate. REPLACE BANDAGES   If your wound is bandaged, keep the bandage dry.  Replace the dressing daily until the adhesive film has fallen off or if the  bandage should become wet, unless otherwise instructed by your  physician.  When changing the dressing, do not place tape directly over the  DERMABOND adhesive film, because removing the tape later may also  remove the film. AVOID TOPICAL MEDICATIONS   Do not apply liquid or ointment medications or any other product to your wound while the  DERMABOND adhesive film is in place.  These may loosen the film before your wound is healed. KEEP WOUND DRY AND PROTECTED   You may occasionally and briefly wet your wound in the shower or bath. Do not soak or scrub  your wound, do not swim, and avoid periods of heavy perspiration until the DERMABOND  adhesive has naturally fallen off. After showering or bathing, gently blot your wound dry with a  soft towel. If a protective dressing is being used, apply a fresh, dry bandage, being sure to keep  the tape off the DERMABOND adhesive film.  Apply a clean, dry bandage over the wound if necessary to protect it.  Protect your wound from injury until the skin has had sufficient time to heal.   Do not scratch, rub, or pick at the DERMABOND adhesive film. This may loosen the film before  your wound is healed.  Protect the wound from prolonged exposure to sunlight or tanning lamps while the film is in  place. If you have any questions or concerns about this product, please consult your doctor. *Trademark ©[employer] 2002    DISCHARGE SUMMARY from Nurse    PATIENT INSTRUCTIONS:    After general anesthesia or intravenous sedation, for 24 hours or while taking prescription Narcotics:  Limit your activities  Do not drive and operate hazardous machinery  Do not make important personal or business decisions  Do  not drink alcoholic beverages  If you have not urinated within 8 hours after discharge, please contact your surgeon on call.     Report the following to your surgeon:  Excessive pain, swelling, redness or odor of or around the surgical area  Temperature over 100.5  Nausea and vomiting lasting longer than 4 hours or if unable to take medications  Any signs of decreased circulation or nerve impairment to extremity: change in color, persistent  numbness, tingling, coldness or increase pain  Any questions    These are general instructions for a healthy lifestyle:    No smoking/ No tobacco products/ Avoid exposure to second hand smoke  Surgeon General's Warning: Quitting smoking now greatly reduces serious risk to your health. Obesity, smoking, and sedentary lifestyle greatly increases your risk for illness    A healthy diet, regular physical exercise & weight monitoring are important for maintaining a healthy lifestyle    You may be retaining fluid if you have a history of heart failure or if you experience any of the following symptoms:  Weight gain of 3 pounds or more overnight or 5 pounds in a week, increased swelling in our hands or feet or shortness of breath while lying flat in bed. Please call your doctor as soon as you notice any of these symptoms; do not wait until your next office visit. The discharge information has been reviewed with the patient and spouse. The patient and spouse verbalized understanding. Discharge medications reviewed with the patient and spouse and appropriate educational materials and side effects teaching were provided. ___________________________________________________________________________________________________________________________________  Patient Education      Hydrocodone/Acetaminophen (Vicodin, Norco, Lortab) - (By mouth)   Why this medicine is used:   Treats pain. Contact a nurse or doctor right away if you have:  · Blistering, peeling, red skin rash  · Fast or slow heartbeat, shallow breathing, blue lips, fingernails, or skin  · Anxiety, restlessness, muscle spasms, twitching, seeing or hearing things that are not there  · Dark urine or pale stools, yellow skin or eyes  · Extreme weakness, sweating, seizures, cold or clammy skin  · Lightheadedness, dizziness, fainting, fever, sweating     Common side effects:  · Constipation, nausea, vomiting, loss of appetite, stomach pain  · Tiredness or sleepiness  © 2017 [facility] Information is for End User's use only and may not be sold, redistributed or otherwise used for commercial purposes.

## 2020-01-09 NOTE — OP NOTES
[address]  OPERATIVE REPORT    Name:  Marko Severin  MR#:  [id number]  :  1945  ACCOUNT #:  [de-identified]  DATE OF SERVICE:  2020    PREOPERATIVE DIAGNOSIS:  Recurrent left upper back mass. POSTOPERATIVE DIAGNOSIS:  Recurrent left upper back intramuscular mass, approximately 6 cm. PROCEDURE PERFORMED:  Excisional biopsy of recurrent intramuscular left upper back mass. SURGEON:  Nemesio Hernandez MD    ASSISTANT:  None. ANESTHESIA:  General.    COMPLICATIONS:  None immediate. SPECIMENS REMOVED:  Left upper back intramuscular mass, left upper back subcutaneous adipose tissue. IMPLANTS:  None. ESTIMATED BLOOD LOSS:  Minimal.    FINDINGS:  There was a scar over the left upper back with underlying mass. The mass turned out to be intramuscular overlying the scapula. There was also nodular fatty tissue in the subcutaneous tissues just cephalad to the incision. INDICATIONS:  The patient is a 72-year-old male who decades go had a mass removed from the same location. It grew back over the years and approximately a year ago another physician excised it again. It has now more rapidly grown back. There were atypical cells on prior excision but no evidence for malignancy. DESCRIPTION OF PROCEDURE:  After obtaining informed consent, the patient was taken to operating room and placed supine on the operating table. An operative time-out was performed and general endotracheal anesthesia was induced. Preoperative antibiotics were administered and the patient was then rolled to the right decubitus position with appropriate padding and securing straps. The back was prepped and draped in usual sterile fashion. An incision was made over the old incision. This was taken down through the subcutaneous tissue with electrocautery. The mass was noted deep to Tomy's fascia.   This was incised with a blade and then the mass was dissected out from the surrounding tissues. There was a significant amount of edema. The mass was noted to originate deep to the muscular layer and the muscles were dissected off of the mass, taking a rim of striated muscle with the mass. The mass appeared to terminate to the lateral aspect of the scapula and it was truncated at this area using electrocautery. It was passed off for permanent pathology. There was some nodular adipose that did not appear to be grossly normal in the subcutaneous tissue cephalad to the incision and this was excised using electrocautery and passed off separately. The muscular and subcutaneous layers were inspected for hemostasis and excellent hemostasis was noted. The operative field was irrigated with bacitracin and saline and checked again for excellent hemostasis. The Tomy's fascia was then closed with interrupted 2-0 Vicryl sutures. The deep dermal tissues were reapproximated with buried interrupted 3-0 Vicryl sutures. The skin was closed with 4-0 Monocryl in the subcuticular layer. The incision was dressed with Dermabond and the patient was taken to the PACU still on the ventilator due to inability to wean. All instrument, sponge and needle counts were reported as correct.       Kamron Minor MD      [id number]  D:  01/08/2020 17:07  T:  01/08/2020 21:47  JOB #:  [id number]  CC:  Heber Wilkins MD

## 2020-01-31 ENCOUNTER — OFFICE VISIT (OUTPATIENT)
Dept: SURGERY | Age: 75
End: 2020-01-31

## 2020-01-31 VITALS
BODY MASS INDEX: 36.33 KG/M2 | TEMPERATURE: 97.5 F | HEART RATE: 76 BPM | RESPIRATION RATE: 20 BRPM | OXYGEN SATURATION: 92 % | WEIGHT: 239.7 LBS | HEIGHT: 68 IN | DIASTOLIC BLOOD PRESSURE: 87 MMHG | SYSTOLIC BLOOD PRESSURE: 145 MMHG

## 2020-01-31 DIAGNOSIS — Z09 POSTOPERATIVE EXAMINATION: Primary | ICD-10-CM

## 2020-01-31 NOTE — Clinical Note
1/31/20 Patient: Purnima Barboza. YOB: 1945 Date of Visit: 1/31/2020 Judi Licona MD 
[facility] [id number] VIA In Basket Dear Judi Licona MD, Thank you for referring Mr. Sushila Montes to  [facility]  for evaluation. My notes for this consultation are attached. If you have questions, please do not hesitate to call me. I look forward to following your patient along with you.  
 
 
Sincerely, 
 
Lorena Hernandez MD

## 2020-01-31 NOTE — PROGRESS NOTES
To: Rupert Lopez MD    From: Scott Viera MD    Thank you for referring [facility]. Encounter Date: 1/31/2020    Subjective:      Kaiden Villanueva. is a 69 y.o. male presents for postop care. The patient has no complaints. Objective:     General:  alert, cooperative, no distress, appears stated age   Incision:  healing well, no drainage, no erythema, small seroma     Assessment:     S/p excision of recurrent back lipoma -- atypical but no malignancy. Doing well postoperatively. Plan:     Follow-up only as needed. Told to call for any concerns.       Scott Viera MD

## 2020-04-13 RX ORDER — LOSARTAN POTASSIUM 25 MG/1
TABLET ORAL
Qty: 180 TAB | Refills: 1 | Status: SHIPPED | OUTPATIENT
Start: 2020-04-13 | End: 2020-10-07

## 2020-10-07 RX ORDER — LOSARTAN POTASSIUM 25 MG/1
TABLET ORAL
Qty: 180 TAB | Refills: 1 | Status: SHIPPED | OUTPATIENT
Start: 2020-10-07 | End: 2021-02-15

## 2020-11-12 ENCOUNTER — TRANSCRIBE ORDER (OUTPATIENT)
Dept: SCHEDULING | Age: 75
End: 2020-11-12

## 2020-11-12 DIAGNOSIS — Z13.6 SCREENING FOR HEART DISEASE: Primary | ICD-10-CM

## 2020-11-19 NOTE — ANESTHESIA POSTPROCEDURE EVALUATION
Patient and responsible adult verbalized understanding of discharge instructions, sedation medication, and potential complications including pain. Patient instructed to call Doctor if complications occur. Procedure(s):  EXCISION OF LEFT UPPER BACK LIPOMA.    general    Anesthesia Post Evaluation        Patient location during evaluation: PACU  Note status: Adequate. Level of consciousness: responsive to verbal stimuli and sleepy but conscious  Pain management: satisfactory to patient  Airway patency: patent  Anesthetic complications: no  Cardiovascular status: acceptable  Respiratory status: acceptable  Hydration status: acceptable  Comments: +Post-Anesthesia Evaluation and Assessment    Patient: Elinor Chamorro. MRN: [id number]  SSN: [id number]   YOB: 1945  Age: 69 y.o. Sex: male      Cardiovascular Function/Vital Signs    /76   Pulse 97   Temp 37 °C (98.6 °F)   Resp 18   Ht 5' 8\" (1.727 m)   Wt 108 kg (238 lb 1.6 oz)   SpO2 96%   BMI 36.20 kg/m²     Patient is status post Procedure(s):  EXCISION OF LEFT UPPER BACK LIPOMA. Nausea/Vomiting: Controlled. Postoperative hydration reviewed and adequate. Pain:  Pain Scale 1: Numeric (0 - 10) (01/08/20 1407)  Pain Intensity 1: 0 (01/08/20 1407)   Managed. Neurological Status:   Neuro (WDL): Within Defined Limits (01/08/20 1423)   At baseline. Mental Status and Level of Consciousness: Arousable. Pulmonary Status:   O2 Device: Room air (01/08/20 1407)   Adequate oxygenation and airway patent. Complications related to anesthesia: None    Post-anesthesia assessment completed. No concerns. Signed By: Nai George MD    1/8/2020  Post anesthesia nausea and vomiting:  controlled      Vitals Value Taken Time   /76 1/8/2020  6:00 PM   Temp 37 °C (98.6 °F) 1/8/2020  5:17 PM   Pulse 99 1/8/2020  6:04 PM   Resp 14 1/8/2020  6:04 PM   SpO2 97 % 1/8/2020  6:04 PM   Vitals shown include unvalidated device data.

## 2020-11-20 ENCOUNTER — TELEPHONE (OUTPATIENT)
Dept: FAMILY MEDICINE CLINIC | Age: 75
End: 2020-11-20

## 2020-11-20 NOTE — TELEPHONE ENCOUNTER
I have called and talked to [facility], she sees where you added the new DX onto the original order, but that will now do it and still produces an ABN. You need to do a whole new order with the new DX on it for Insurance to pay.

## 2020-11-20 NOTE — TELEPHONE ENCOUNTER
Please call Mario Zhou, from [employer], as she needs a new order for the ultrasound on the patient.   She can be reached at [phone]

## 2020-11-24 NOTE — TELEPHONE ENCOUNTER
Mara Ontiveros calls to let me know she has gotten the new order and has run it through [employer] and it went through and approved.

## 2020-11-24 NOTE — TELEPHONE ENCOUNTER
I have called and left a message for Osmani Valdez letting her know that Dr Robyn Garduno did a new order for this patient's US for [employer]. She should try it through the insurance company and let me know how it goes.

## 2020-11-24 NOTE — TELEPHONE ENCOUNTER
I have called and left a message for [facility] from [facility] to return my call. Dr. Mike Glez wants to know why the order is not sufficient, it has both DX's on it. Why does a new order need to be written?

## 2021-02-15 RX ORDER — LOSARTAN POTASSIUM 25 MG/1
TABLET ORAL
Qty: 180 TAB | Refills: 1 | Status: SHIPPED | OUTPATIENT
Start: 2021-02-15 | End: 2021-07-27 | Stop reason: SDUPTHER

## 2021-03-18 ENCOUNTER — OFFICE VISIT (OUTPATIENT)
Dept: FAMILY MEDICINE CLINIC | Age: 76
End: 2021-03-18
Payer: MEDICARE

## 2021-03-18 VITALS
SYSTOLIC BLOOD PRESSURE: 139 MMHG | HEIGHT: 68 IN | BODY MASS INDEX: 36.98 KG/M2 | DIASTOLIC BLOOD PRESSURE: 78 MMHG | RESPIRATION RATE: 16 BRPM | WEIGHT: 244 LBS | TEMPERATURE: 98.2 F

## 2021-03-18 DIAGNOSIS — E78.5 HYPERLIPIDEMIA, UNSPECIFIED HYPERLIPIDEMIA TYPE: ICD-10-CM

## 2021-03-18 DIAGNOSIS — Q39.4 ESOPHAGEAL WEB: ICD-10-CM

## 2021-03-18 DIAGNOSIS — F32.A DEPRESSION, UNSPECIFIED DEPRESSION TYPE: ICD-10-CM

## 2021-03-18 DIAGNOSIS — I10 ESSENTIAL HYPERTENSION: ICD-10-CM

## 2021-03-18 DIAGNOSIS — E11.9 TYPE 2 DIABETES MELLITUS WITHOUT COMPLICATION, WITHOUT LONG-TERM CURRENT USE OF INSULIN (HCC): Primary | ICD-10-CM

## 2021-03-18 DIAGNOSIS — R13.10 DYSPHAGIA, UNSPECIFIED TYPE: ICD-10-CM

## 2021-03-18 LAB
ALBUMIN SERPL-MCNC: 3.6 G/DL (ref 3.5–5)
ALBUMIN/GLOB SERPL: 1.2 {RATIO} (ref 1.1–2.2)
ALP SERPL-CCNC: 90 U/L (ref 45–117)
ALT SERPL-CCNC: 67 U/L (ref 12–78)
ANION GAP SERPL CALC-SCNC: 7 MMOL/L (ref 5–15)
AST SERPL-CCNC: 48 U/L (ref 15–37)
BILIRUB SERPL-MCNC: 0.5 MG/DL (ref 0.2–1)
BUN SERPL-MCNC: 18 MG/DL (ref 6–20)
BUN/CREAT SERPL: 20 (ref 12–20)
CALCIUM SERPL-MCNC: 9.1 MG/DL (ref 8.5–10.1)
CHLORIDE SERPL-SCNC: 101 MMOL/L (ref 97–108)
CHOLEST SERPL-MCNC: 170 MG/DL
CO2 SERPL-SCNC: 32 MMOL/L (ref 21–32)
CREAT SERPL-MCNC: 0.88 MG/DL (ref 0.7–1.3)
EST. AVERAGE GLUCOSE BLD GHB EST-MCNC: 160 MG/DL
GLOBULIN SER CALC-MCNC: 2.9 G/DL (ref 2–4)
GLUCOSE SERPL-MCNC: 198 MG/DL (ref 65–100)
HBA1C MFR BLD: 7.2 % (ref 4–5.6)
HDLC SERPL-MCNC: 33 MG/DL
HDLC SERPL: 5.2 {RATIO} (ref 0–5)
LDLC SERPL CALC-MCNC: 84.4 MG/DL (ref 0–100)
LIPID PROFILE,FLP: ABNORMAL
POTASSIUM SERPL-SCNC: 3.5 MMOL/L (ref 3.5–5.1)
PROT SERPL-MCNC: 6.5 G/DL (ref 6.4–8.2)
SODIUM SERPL-SCNC: 140 MMOL/L (ref 136–145)
TRIGL SERPL-MCNC: 263 MG/DL (ref ?–150)
TSH SERPL DL<=0.05 MIU/L-ACNC: 3.88 UIU/ML (ref 0.36–3.74)
VLDLC SERPL CALC-MCNC: 52.6 MG/DL

## 2021-03-18 PROCEDURE — 2022F DILAT RTA XM EVC RTNOPTHY: CPT | Performed by: FAMILY MEDICINE

## 2021-03-18 PROCEDURE — 1101F PT FALLS ASSESS-DOCD LE1/YR: CPT | Performed by: FAMILY MEDICINE

## 2021-03-18 PROCEDURE — 99214 OFFICE O/P EST MOD 30 MIN: CPT | Performed by: FAMILY MEDICINE

## 2021-03-18 PROCEDURE — 3017F COLORECTAL CA SCREEN DOC REV: CPT | Performed by: FAMILY MEDICINE

## 2021-03-18 PROCEDURE — G8752 SYS BP LESS 140: HCPCS | Performed by: FAMILY MEDICINE

## 2021-03-18 PROCEDURE — G8431 POS CLIN DEPRES SCRN F/U DOC: HCPCS | Performed by: FAMILY MEDICINE

## 2021-03-18 PROCEDURE — G8754 DIAS BP LESS 90: HCPCS | Performed by: FAMILY MEDICINE

## 2021-03-18 PROCEDURE — 3046F HEMOGLOBIN A1C LEVEL >9.0%: CPT | Performed by: FAMILY MEDICINE

## 2021-03-18 PROCEDURE — G8427 DOCREV CUR MEDS BY ELIG CLIN: HCPCS | Performed by: FAMILY MEDICINE

## 2021-03-18 PROCEDURE — G8536 NO DOC ELDER MAL SCRN: HCPCS | Performed by: FAMILY MEDICINE

## 2021-03-18 PROCEDURE — G8417 CALC BMI ABV UP PARAM F/U: HCPCS | Performed by: FAMILY MEDICINE

## 2021-03-18 RX ORDER — BUPROPION HYDROCHLORIDE 150 MG/1
150 TABLET ORAL
Qty: 30 TAB | Refills: 0 | Status: SHIPPED | OUTPATIENT
Start: 2021-03-18 | End: 2021-04-01 | Stop reason: SDUPTHER

## 2021-03-18 RX ORDER — METFORMIN HYDROCHLORIDE 500 MG/1
TABLET ORAL
Qty: 90 TAB | Refills: 0 | Status: SHIPPED | OUTPATIENT
Start: 2021-03-18 | End: 2021-07-27 | Stop reason: SDUPTHER

## 2021-03-18 NOTE — PROGRESS NOTES
1. Have you been to the ER, urgent care clinic since your last visit? Hospitalized since your last visit? No    2. Have you seen or consulted any other health care providers outside of the [address] [place] [address] since your last visit? Include any pap smears or colon screening.  No

## 2021-03-18 NOTE — PROGRESS NOTES
Elan Parikh. is a 77 y.o. male who presents to the office today with the following:  Chief Complaint   Patient presents with    Depression    Hypertension       HPI  HTN  BP high    Eating all the time per wife  Irritable all the time  Sleeps all the time  Brother  with brain and lung tumor  Other brother  2 y ago  Sweating perfuseley    Hyperlipidemia  Fasting today    DM  On Metformin one a day  No SE  Due for recheck  Not exercising  Has SOB  Had stress test    Trouble swallowing  Foods, not liquids    Review of Systems   Constitutional: Negative for fever and weight loss. HENT: Negative for congestion. Respiratory: Positive for shortness of breath. Negative for cough. Cardiovascular: Negative for chest pain. Psychiatric/Behavioral: Positive for depression. Negative for hallucinations and suicidal ideas. The patient does not have insomnia. See HPI. Past Medical History:   Diagnosis Date    Arthritis     BPH (benign prostatic hyperplasia) 2015    Diabetes ([facility] [address]     borderline    Enlarged prostate     Fatty liver     GERD (gastroesophageal reflux disease)     Hypertension     Ill-defined condition     lt shoulder lipoma    Lipoma     Lipoma of back 2018    left shoulderblade area    Rotator cuff tear        Past Surgical History:   Procedure Laterality Date    HX CHOLECYSTECTOMY      HX CYST REMOVAL  10/10/2017    HX HERNIA REPAIR      HX MOHS PROCEDURES      HX ORTHOPAEDIC       lt  shoulder replacement    HX PREMALIG/BENIGN SKIN LESION EXCISION  10/10/2017    Excision left shoulder cyst- Alverto Marquez MD    HX ROTATOR CUFF REPAIR      rt shoulder       Allergies   Allergen Reactions    Lisinopril Cough    Pcn [Penicillins] Hives    Tramadol Nausea and Vomiting     Other reaction(s): Nausea and Vomiting       Current Outpatient Medications   Medication Sig    buPROPion XL (WELLBUTRIN XL) 150 mg tablet Take 1 Tab by mouth every morning.  metFORMIN (GLUCOPHAGE) 500 mg tablet TAKE 1 TABLET BY MOUTH EVERY DAY IN THE MORNING WITH BREAKFAST    losartan (COZAAR) 25 mg tablet TAKE 2 TABLETS BY MOUTH EVERY DAY (THIS REPLACES LISINOPRIL)    atorvastatin (LIPITOR) 40 mg tablet TAKE 1 TABLET BY MOUTH EVERY DAY    diclofenac EC (VOLTAREN) 75 mg EC tablet TAKE 1 TABLET BY MOUTH DAILY IF NEEDED FOR ARTHRITIS    hydroCHLOROthiazide (HYDRODIURIL) 25 mg tablet Take 1 Tab by mouth daily.  cetirizine (ZYRTEC) 10 mg tablet Take  by mouth.  famotidine (PEPCID) 40 mg tablet TAKE 1 TABLET BY MOUTH EVERY DAY (Patient taking differently: as needed. TAKE 1 TABLET BY MOUTH EVERY DAY)    loratadine (CLARITIN) 10 mg tablet Take 10 mg by mouth daily as needed for Allergies.  finasteride (PROSCAR) 5 mg tablet Take 5 mg by mouth daily.  doxazosin (CARDURA) 4 mg tablet Take 4 mg by mouth daily. No current facility-administered medications for this visit.         Social History     Socioeconomic History    Marital status:      Spouse name: Not on file    Number of children: Not on file    Years of education: Not on file    Highest education level: Not on file   Tobacco Use    Smoking status: Former Smoker     Packs/day: 1.00     Years: 40.00     Pack years: 40.00     Quit date: 10/5/2009     Years since quittin.4    Smokeless tobacco: Never Used   Substance and Sexual Activity    Alcohol use: No     Alcohol/week: 0.0 standard drinks    Drug use: No    Sexual activity: Yes     Partners: Female       Family History   Problem Relation Age of Onset    Cancer Mother         bone & gland    Heart Disease Father     Cancer Sister         breast & ovarian    Heart Disease Sister     Diabetes Brother     Coronary Artery Disease Brother     Cancer Niece         bone & brain -  age 43         Physical Exam:  Visit Vitals  /78   Temp 98.2 °F (36.8 °C) (Oral)   Resp 16   Ht 5' 8\" (1.727 m)   Wt 244 lb (110.7 kg)   BMI 37.10 kg/m² Physical Exam  Vitals signs and nursing note reviewed. Constitutional:       Appearance: He is obese. HENT:      Head: Normocephalic and atraumatic. Cardiovascular:      Rate and Rhythm: Normal rate and regular rhythm. Pulmonary:      Effort: Pulmonary effort is normal.   Lymphadenopathy:      Cervical: No cervical adenopathy. Neurological:      Mental Status: He is alert. Assessment/Plan:    ICD-10-CM ICD-9-CM    1. Type 2 diabetes mellitus without complication, without long-term current use of insulin (HCC)  E11.9 250.00 HEMOGLOBIN A1C WITH EAG      metFORMIN (GLUCOPHAGE) 500 mg tablet   2. Hyperlipidemia, unspecified hyperlipidemia type  E78.5 272.4 LIPID PANEL   3. Essential hypertension  A74 652.4 METABOLIC PANEL, COMPREHENSIVE   4. Depression, unspecified depression type  F32.9 311 TSH 3RD GENERATION      buPROPion XL (WELLBUTRIN XL) 150 mg tablet   5. Dysphagia, unspecified type  R13.10 787.20 XR BA SWALLOW ESOPHOGRAM     Wellbutrin started    Follow-up and Dispositions    · Return in about 2 weeks (around 4/1/2021).    Follow-up and Disposition History          Bossman Ortiz MD

## 2021-03-19 NOTE — PROGRESS NOTES
Call pt, the Chol is ok  The HbA1C is 7.2, which is stable  The electrolytes, liver and kidney tests are normal  The thyroid test is a little off, recheck in 3 mo

## 2021-03-19 NOTE — PROGRESS NOTES
Spoke with patient after verifying Name, and , informed patient of lab results and recommendations per Dr. Angelita Gomez . Patient given an opportunity to ask questions, repeated information, and verbalized understanding.

## 2021-04-01 ENCOUNTER — OFFICE VISIT (OUTPATIENT)
Dept: FAMILY MEDICINE CLINIC | Age: 76
End: 2021-04-01
Payer: MEDICARE

## 2021-04-01 VITALS
HEIGHT: 68 IN | BODY MASS INDEX: 36.68 KG/M2 | RESPIRATION RATE: 14 BRPM | DIASTOLIC BLOOD PRESSURE: 82 MMHG | SYSTOLIC BLOOD PRESSURE: 132 MMHG | OXYGEN SATURATION: 95 % | HEART RATE: 81 BPM | TEMPERATURE: 97.9 F | WEIGHT: 242 LBS

## 2021-04-01 DIAGNOSIS — F32.A DEPRESSION, UNSPECIFIED DEPRESSION TYPE: ICD-10-CM

## 2021-04-01 DIAGNOSIS — G44.52 NEW DAILY PERSISTENT HEADACHE: Primary | ICD-10-CM

## 2021-04-01 DIAGNOSIS — K21.9 GERD WITH STRICTURE: ICD-10-CM

## 2021-04-01 DIAGNOSIS — K22.2 GERD WITH STRICTURE: ICD-10-CM

## 2021-04-01 PROCEDURE — G8432 DEP SCR NOT DOC, RNG: HCPCS | Performed by: FAMILY MEDICINE

## 2021-04-01 PROCEDURE — 3017F COLORECTAL CA SCREEN DOC REV: CPT | Performed by: FAMILY MEDICINE

## 2021-04-01 PROCEDURE — 1101F PT FALLS ASSESS-DOCD LE1/YR: CPT | Performed by: FAMILY MEDICINE

## 2021-04-01 PROCEDURE — 36415 COLL VENOUS BLD VENIPUNCTURE: CPT | Performed by: FAMILY MEDICINE

## 2021-04-01 PROCEDURE — G8752 SYS BP LESS 140: HCPCS | Performed by: FAMILY MEDICINE

## 2021-04-01 PROCEDURE — 99213 OFFICE O/P EST LOW 20 MIN: CPT | Performed by: FAMILY MEDICINE

## 2021-04-01 PROCEDURE — G8754 DIAS BP LESS 90: HCPCS | Performed by: FAMILY MEDICINE

## 2021-04-01 PROCEDURE — G8417 CALC BMI ABV UP PARAM F/U: HCPCS | Performed by: FAMILY MEDICINE

## 2021-04-01 PROCEDURE — G8428 CUR MEDS NOT DOCUMENT: HCPCS | Performed by: FAMILY MEDICINE

## 2021-04-01 PROCEDURE — G8536 NO DOC ELDER MAL SCRN: HCPCS | Performed by: FAMILY MEDICINE

## 2021-04-01 RX ORDER — CIPROFLOXACIN 500 MG/1
500 TABLET ORAL
COMMUNITY
Start: 2020-10-15 | End: 2022-02-08 | Stop reason: ALTCHOICE

## 2021-04-01 RX ORDER — BUPROPION HYDROCHLORIDE 150 MG/1
150 TABLET ORAL
Qty: 30 TAB | Refills: 0 | Status: SHIPPED | OUTPATIENT
Start: 2021-04-01 | End: 2021-05-17

## 2021-04-01 NOTE — PROGRESS NOTES
Non-Provider Advance Care Planning (ACP) Note    Date of ACP Conversation: 4/1/2021  Persons included in Conversation: patient  Length of ACP Conversation in minutes: <16 minutes (Non-Billable)    Conversation requested by:   Provider    Authorized Decision Maker (if patient is incapable of making informed decisions):    This person is:  Next of Kin by law (only applies in absence of a Healthcare Power of  or Legal Guardian)      Primary Decision Maker: Nicole Lagos [facility] - [phone]    General ACP for ALL Patients with Decision Making Capacity:    Advance Directive Conversation with Patients who have not yet planned:  Importance of advance care planning, including choosing a healthcare agent to communicate patient's healthcare decisions if patient lost the ability to make decisions, such as after a sudden illness or accident    Review of Existing Advance Directive: (Select questions covered)  na    Interventions Provided:  Provided ACP educational materials:  Conversation Starter Kit  Advance Directive Form

## 2021-04-01 NOTE — PROGRESS NOTES
Rupal Moreno. is a 77 y.o. male who presents to the office today with the following:  Chief Complaint   Patient presents with    Medication Evaluation    Headache     continuing       HPI  Pt here for recheck of Depression   Was sleeping and eating all the time and irritable per wife  Brothers had     Wellbutrin was started 2-3 w ago  Here for recheck to see if any SE  No SE with it, but not seeing much of a difference      Got headaches for a long time started 3 mo ago  On temples bilat  Every am -9/10  Sharp  Taking Tylenol  Better when not doing anything and lying back and closing his eyes  Brother  of brain tumor in       Review of Systems   Eyes: Negative for blurred vision, double vision and photophobia. Cardiovascular: Negative for chest pain. Gastrointestinal: Negative for nausea. Neurological: Positive for headaches. Negative for dizziness. Psychiatric/Behavioral: The patient does not have insomnia. See HPI.     Past Medical History:   Diagnosis Date    Arthritis     BPH (benign prostatic hyperplasia) 2015    Diabetes ([facility] [address]     borderline    Enlarged prostate     Fatty liver     GERD (gastroesophageal reflux disease)     Hypertension     Ill-defined condition     lt shoulder lipoma    Lipoma     Lipoma of back 2018    left shoulderblade area    Rotator cuff tear        Past Surgical History:   Procedure Laterality Date    HX CHOLECYSTECTOMY      HX CYST REMOVAL  10/10/2017    HX HERNIA REPAIR      HX MOHS PROCEDURES      HX ORTHOPAEDIC       lt  shoulder replacement    HX PREMALIG/BENIGN SKIN LESION EXCISION  10/10/2017    Excision left shoulder cyst- Ronaldo Koyanagi, MD    HX ROTATOR CUFF REPAIR      rt shoulder       Allergies   Allergen Reactions    Lisinopril Cough    Pcn [Penicillins] Hives    Tramadol Nausea and Vomiting     Other reaction(s): Nausea and Vomiting       Current Outpatient Medications   Medication Sig    ciprofloxacin HCl (CIPRO) 500 mg tablet Take 500 mg by mouth two (2) times daily as needed.  buPROPion XL (WELLBUTRIN XL) 150 mg tablet Take 1 Tab by mouth every morning.  metFORMIN (GLUCOPHAGE) 500 mg tablet TAKE 1 TABLET BY MOUTH EVERY DAY IN THE MORNING WITH BREAKFAST    losartan (COZAAR) 25 mg tablet TAKE 2 TABLETS BY MOUTH EVERY DAY (THIS REPLACES LISINOPRIL)    atorvastatin (LIPITOR) 40 mg tablet TAKE 1 TABLET BY MOUTH EVERY DAY    diclofenac EC (VOLTAREN) 75 mg EC tablet TAKE 1 TABLET BY MOUTH DAILY IF NEEDED FOR ARTHRITIS    hydroCHLOROthiazide (HYDRODIURIL) 25 mg tablet Take 1 Tab by mouth daily.  cetirizine (ZYRTEC) 10 mg tablet Take  by mouth.  famotidine (PEPCID) 40 mg tablet TAKE 1 TABLET BY MOUTH EVERY DAY (Patient taking differently: as needed. TAKE 1 TABLET BY MOUTH EVERY DAY)    loratadine (CLARITIN) 10 mg tablet Take 10 mg by mouth daily as needed for Allergies.  finasteride (PROSCAR) 5 mg tablet Take 5 mg by mouth daily.  doxazosin (CARDURA) 4 mg tablet Take 4 mg by mouth daily. No current facility-administered medications for this visit.         Social History     Socioeconomic History    Marital status:      Spouse name: Not on file    Number of children: Not on file    Years of education: Not on file    Highest education level: Not on file   Tobacco Use    Smoking status: Former Smoker     Packs/day: 1.00     Years: 40.00     Pack years: 40.00     Quit date: 10/5/2009     Years since quittin.4    Smokeless tobacco: Never Used   Substance and Sexual Activity    Alcohol use: No     Alcohol/week: 0.0 standard drinks    Drug use: No    Sexual activity: Yes     Partners: Female       Family History   Problem Relation Age of Onset    Cancer Mother         bone & gland    Heart Disease Father     Cancer Sister         breast & ovarian    Heart Disease Sister     Diabetes Brother     Coronary Artery Disease Brother     Cancer Niece         bone & brain -  age 43         Physical Exam:  Visit Vitals  /82 (BP 1 Location: Right upper arm)   Pulse 81   Temp 97.9 °F (36.6 °C)   Resp 14   Ht 5' 8\" (1.727 m)   Wt 242 lb (109.8 kg)   SpO2 95%   BMI 36.80 kg/m²     Physical Exam  Vitals signs and nursing note reviewed. Constitutional:       Appearance: He is obese. HENT:      Head: Normocephalic and atraumatic. Pulmonary:      Effort: Pulmonary effort is normal.   Skin:     General: Skin is warm and dry. Neurological:      Mental Status: He is alert. Mental status is at baseline. He is disoriented. Cranial Nerves: No cranial nerve deficit. Sensory: No sensory deficit. Motor: No weakness. Coordination: Coordination normal.   Psychiatric:         Mood and Affect: Mood normal.         Behavior: Behavior normal.         Assessment/Plan:    ICD-10-CM ICD-9-CM    1. New daily persistent headache  G44.52 339.42 SED RATE (ESR)      CT HEAD WO CONT      COLLECTION VENOUS BLOOD,VENIPUNCTURE      COLLECTION VENOUS BLOOD,VENIPUNCTURE      SED RATE (ESR)   2. Depression, unspecified depression type  F32.9 311 buPROPion XL (WELLBUTRIN XL) 150 mg tablet     Cont Wellbutrin and recheck in 1 mo    Follow-up and Dispositions    · Return in about 4 weeks (around 2021).          Amanda Gomez MD

## 2021-04-02 LAB — ERYTHROCYTE [SEDIMENTATION RATE] IN BLOOD: 17 MM/HR (ref 0–20)

## 2021-04-02 NOTE — PROGRESS NOTES
Spoke with patient after verifying Name, and , informed patient of lab results per Dr. Francis Vu . Patient given an opportunity to ask questions, repeated information, and verbalized understanding.

## 2021-04-05 ENCOUNTER — HOSPITAL ENCOUNTER (OUTPATIENT)
Dept: GENERAL RADIOLOGY | Age: 76
Discharge: HOME | End: 2021-04-05
Attending: FAMILY MEDICINE
Payer: MEDICARE

## 2021-04-05 DIAGNOSIS — R13.10 DYSPHAGIA, UNSPECIFIED TYPE: ICD-10-CM

## 2021-04-05 PROCEDURE — 74220 X-RAY XM ESOPHAGUS 1CNTRST: CPT

## 2021-04-05 NOTE — PROGRESS NOTES
I have spoken to the pt and his wife who called back for explanation  The barium swallow is showing a narrowing in the esophagus  I will refer pt to Dr Lily Coffey for dilatation

## 2021-04-12 ENCOUNTER — TELEPHONE (OUTPATIENT)
Dept: FAMILY MEDICINE CLINIC | Age: 76
End: 2021-04-12

## 2021-04-21 ENCOUNTER — HOSPITAL ENCOUNTER (OUTPATIENT)
Dept: CT IMAGING | Age: 76
Discharge: HOME | End: 2021-04-21
Attending: FAMILY MEDICINE
Payer: MEDICARE

## 2021-04-21 DIAGNOSIS — G44.52 NEW DAILY PERSISTENT HEADACHE: ICD-10-CM

## 2021-04-21 PROCEDURE — 70450 CT HEAD/BRAIN W/O DYE: CPT

## 2021-04-22 NOTE — PROGRESS NOTES
I have called and talked to this patient's wife and gave her the results to this CT per Dr Gomez Foil review. Patient's wife verbalizes understanding.

## 2021-04-27 ENCOUNTER — TELEPHONE (OUTPATIENT)
Dept: FAMILY MEDICINE CLINIC | Age: 76
End: 2021-04-27

## 2021-04-27 NOTE — TELEPHONE ENCOUNTER
Patient would like to have his surgery done at [address] [place] [address] in lieu of [facility] [place].   They called to switch it but they told them they needed a new referral.  Please call Wheatlandvick Padilla (wife)  at [phone]

## 2021-05-20 ENCOUNTER — OFFICE VISIT (OUTPATIENT)
Dept: FAMILY MEDICINE CLINIC | Age: 76
End: 2021-05-20
Payer: MEDICARE

## 2021-05-20 VITALS
HEIGHT: 68 IN | DIASTOLIC BLOOD PRESSURE: 90 MMHG | HEART RATE: 42 BPM | RESPIRATION RATE: 16 BRPM | WEIGHT: 242.8 LBS | SYSTOLIC BLOOD PRESSURE: 141 MMHG | BODY MASS INDEX: 36.8 KG/M2 | TEMPERATURE: 98.5 F | OXYGEN SATURATION: 93 %

## 2021-05-20 DIAGNOSIS — E11.9 TYPE 2 DIABETES MELLITUS WITHOUT COMPLICATION, WITHOUT LONG-TERM CURRENT USE OF INSULIN (HCC): ICD-10-CM

## 2021-05-20 DIAGNOSIS — Z01.818 PRE-OP EVALUATION: Primary | ICD-10-CM

## 2021-05-20 PROCEDURE — G8755 DIAS BP > OR = 90: HCPCS | Performed by: FAMILY MEDICINE

## 2021-05-20 PROCEDURE — 99213 OFFICE O/P EST LOW 20 MIN: CPT | Performed by: FAMILY MEDICINE

## 2021-05-20 PROCEDURE — G8427 DOCREV CUR MEDS BY ELIG CLIN: HCPCS | Performed by: FAMILY MEDICINE

## 2021-05-20 PROCEDURE — 2022F DILAT RTA XM EVC RTNOPTHY: CPT | Performed by: FAMILY MEDICINE

## 2021-05-20 PROCEDURE — 3017F COLORECTAL CA SCREEN DOC REV: CPT | Performed by: FAMILY MEDICINE

## 2021-05-20 PROCEDURE — G8432 DEP SCR NOT DOC, RNG: HCPCS | Performed by: FAMILY MEDICINE

## 2021-05-20 PROCEDURE — G8536 NO DOC ELDER MAL SCRN: HCPCS | Performed by: FAMILY MEDICINE

## 2021-05-20 PROCEDURE — 1101F PT FALLS ASSESS-DOCD LE1/YR: CPT | Performed by: FAMILY MEDICINE

## 2021-05-20 PROCEDURE — 3051F HG A1C>EQUAL 7.0%<8.0%: CPT | Performed by: FAMILY MEDICINE

## 2021-05-20 PROCEDURE — G8753 SYS BP > OR = 140: HCPCS | Performed by: FAMILY MEDICINE

## 2021-05-20 PROCEDURE — G8417 CALC BMI ABV UP PARAM F/U: HCPCS | Performed by: FAMILY MEDICINE

## 2021-05-20 NOTE — PROGRESS NOTES
Agata Livingston is a 77 y.o. male who presents to the office today with the following:  Chief Complaint   Patient presents with    Medication Evaluation     having an EGD and needs to know what meds to take and not take prior to the procedure       HPI  Needs to hold blood thinners before EGD on 7/6/21  Not on blood thinners  But on Diclofenac orally and taking the gel    Out of BS meds, Metformin  Last HbA1C 7.2  Pt not taking Metformin now  Stated he did not get it from the pharmacy      ROS  See HPI. Past Medical History:   Diagnosis Date    Arthritis     BPH (benign prostatic hyperplasia) 1/5/2015    Diabetes ([facility] [address]     borderline    Enlarged prostate     Fatty liver     GERD (gastroesophageal reflux disease)     Hypertension     Ill-defined condition 2017    lt shoulder lipoma    Lipoma     Lipoma of back 11/2018    left shoulderblade area    Rotator cuff tear        Past Surgical History:   Procedure Laterality Date    HX CHOLECYSTECTOMY  2001    HX CYST REMOVAL  10/10/2017    HX HERNIA REPAIR  2001    HX MOHS PROCEDURES      HX ORTHOPAEDIC  2015     lt  shoulder replacement    HX PREMALIG/BENIGN SKIN LESION EXCISION  10/10/2017    Excision left shoulder cyst- Cayetano Genao MD    HX ROTATOR CUFF REPAIR      rt shoulder       Allergies   Allergen Reactions    Lisinopril Cough    Pcn [Penicillins] Hives    Tramadol Nausea and Vomiting     Other reaction(s): Nausea and Vomiting    Wellbutrin [Bupropion] Other (comments)     Double vision       Current Outpatient Medications   Medication Sig    ciprofloxacin HCl (CIPRO) 500 mg tablet Take 500 mg by mouth two (2) times daily as needed.     metFORMIN (GLUCOPHAGE) 500 mg tablet TAKE 1 TABLET BY MOUTH EVERY DAY IN THE MORNING WITH BREAKFAST    losartan (COZAAR) 25 mg tablet TAKE 2 TABLETS BY MOUTH EVERY DAY (THIS REPLACES LISINOPRIL)    atorvastatin (LIPITOR) 40 mg tablet TAKE 1 TABLET BY MOUTH EVERY DAY    diclofenac EC (VOLTAREN) 75 mg EC tablet TAKE 1 TABLET BY MOUTH DAILY IF NEEDED FOR ARTHRITIS    hydroCHLOROthiazide (HYDRODIURIL) 25 mg tablet Take 1 Tab by mouth daily.  cetirizine (ZYRTEC) 10 mg tablet Take  by mouth.  famotidine (PEPCID) 40 mg tablet TAKE 1 TABLET BY MOUTH EVERY DAY (Patient taking differently: as needed. TAKE 1 TABLET BY MOUTH EVERY DAY)    loratadine (CLARITIN) 10 mg tablet Take 10 mg by mouth daily as needed for Allergies.  finasteride (PROSCAR) 5 mg tablet Take 5 mg by mouth daily.  doxazosin (CARDURA) 4 mg tablet Take 4 mg by mouth daily. No current facility-administered medications for this visit. Social History     Socioeconomic History    Marital status:      Spouse name: Not on file    Number of children: Not on file    Years of education: Not on file    Highest education level: Not on file   Tobacco Use    Smoking status: Former Smoker     Packs/day: 1.00     Years: 40.00     Pack years: 40.00     Quit date: 10/5/2009     Years since quittin.6    Smokeless tobacco: Never Used   Substance and Sexual Activity    Alcohol use: No     Alcohol/week: 0.0 standard drinks    Drug use: No    Sexual activity: Yes     Partners: Female     Social Determinants of Health     Financial Resource Strain:     Difficulty of Paying Living Expenses:    Food Insecurity:     Worried About Running Out of Food in the Last Year:     [address] in the Last Year:    Transportation Needs:     Lack of Transportation (Medical):      Lack of Transportation (Non-Medical):    Physical Activity:     Days of Exercise per Week:     Minutes of Exercise per Session:    Stress:     Feeling of Stress :    Social Connections:     Frequency of Communication with Friends and Family:     Frequency of Social Gatherings with Friends and Family:     Attends [employer] Services:     Active Member of Clubs or Organizations:     Attends Club or Organization Meetings:     Marital Status:        Family History   Problem Relation Age of Onset    Cancer Mother         bone & gland    Heart Disease Father     Cancer Sister         breast & ovarian    Heart Disease Sister     Diabetes Brother     Coronary Artery Disease Brother     Cancer Niece         bone & brain -  age 43         Physical Exam:  Visit Vitals  BP (!) 141/90 (BP 1 Location: Right arm, BP Patient Position: Sitting, BP Cuff Size: Large adult)   Pulse (!) 42   Temp 98.5 °F (36.9 °C) (Oral)   Resp 16   Ht 5' 8\" (1.727 m)   Wt 242 lb 12.8 oz (110.1 kg)   SpO2 93%   BMI 36.92 kg/m²     Physical Exam  Nursing note reviewed. Constitutional:       Appearance: He is obese. HENT:      Head: Normocephalic and atraumatic. Eyes:      Extraocular Movements: Extraocular movements intact. Conjunctiva/sclera: Conjunctivae normal.   Cardiovascular:      Rate and Rhythm: Normal rate and regular rhythm. Heart sounds: Normal heart sounds. Pulmonary:      Effort: Pulmonary effort is normal.      Breath sounds: Normal breath sounds. Neurological:      Mental Status: He is alert and oriented to person, place, and time. Psychiatric:         Mood and Affect: Mood normal.         Behavior: Behavior normal.         Assessment/Plan:    ICD-10-CM ICD-9-CM    1. Pre-op evaluation  Z01.818 V72.84     EGD   2.  Type 2 diabetes mellitus without complication, without long-term current use of insulin (HCC)  E11.9 250.00        advised to restart taking Metformin one a day  And hold Diclofenac oral med 5 d prior to the EGD    Sin Florence MD

## 2021-05-20 NOTE — ACP (ADVANCE CARE PLANNING)
Non-Provider Advance Care Planning (ACP) Note    Date of ACP Conversation: 5/20/2021  Persons included in Conversation: patient and family  Length of ACP Conversation in minutes: <16 minutes (Non-Billable)    Conversation requested by:   Provider    Authorized Decision Maker (if patient is incapable of making informed decisions):    This person is:  Next of Kin by law (only applies in absence of a Healthcare Power of  or Legal Guardian)      Primary Decision Maker: Nicole Lagos [facility] - [phone]    General ACP for ALL Patients with Decision Making Capacity:    Advance Directive Conversation with Patients who have not yet planned:  Importance of advance care planning, including choosing a healthcare agent to communicate patient's healthcare decisions if patient lost the ability to make decisions, such as after a sudden illness or accident    Review of Existing Advance Directive: (Select questions covered)  N/A    Interventions Provided:  Provided ACP educational materials:  Conversation Starter Kit  Advance Directive Form

## 2021-05-20 NOTE — PROGRESS NOTES
1. Have you been to the ER, urgent care clinic since your last visit? Hospitalized since your last visit? No    2. Have you seen or consulted any other health care providers outside of the [address] [place] [address] since your last visit? Include any pap smears or colon screening.  No

## 2021-07-06 ENCOUNTER — ANESTHESIA (OUTPATIENT)
Dept: ENDOSCOPY | Age: 76
End: 2021-07-06
Payer: MEDICARE

## 2021-07-06 ENCOUNTER — HOSPITAL ENCOUNTER (OUTPATIENT)
Age: 76
Setting detail: OUTPATIENT SURGERY
Discharge: HOME | End: 2021-07-06
Attending: INTERNAL MEDICINE | Admitting: INTERNAL MEDICINE
Payer: MEDICARE

## 2021-07-06 ENCOUNTER — ANESTHESIA EVENT (OUTPATIENT)
Dept: ENDOSCOPY | Age: 76
End: 2021-07-06
Payer: MEDICARE

## 2021-07-06 VITALS
DIASTOLIC BLOOD PRESSURE: 73 MMHG | OXYGEN SATURATION: 92 % | RESPIRATION RATE: 23 BRPM | HEART RATE: 92 BPM | BODY MASS INDEX: 36.22 KG/M2 | HEIGHT: 68 IN | WEIGHT: 239 LBS | SYSTOLIC BLOOD PRESSURE: 132 MMHG | TEMPERATURE: 98.1 F

## 2021-07-06 LAB
COVID-19 RAPID TEST, COVR: NOT DETECTED
SOURCE, COVRS: NORMAL

## 2021-07-06 PROCEDURE — 76040000019: Performed by: INTERNAL MEDICINE

## 2021-07-06 PROCEDURE — 87635 SARS-COV-2 COVID-19 AMP PRB: CPT

## 2021-07-06 PROCEDURE — 74011000250 HC RX REV CODE- 250: Performed by: ANESTHESIOLOGY

## 2021-07-06 PROCEDURE — 77030019988 HC FCPS ENDOSC DISP BSC -B: Performed by: INTERNAL MEDICINE

## 2021-07-06 PROCEDURE — 77030039825 HC MSK NSL PAP SUPERNO2VA VYRM -B: Performed by: ANESTHESIOLOGY

## 2021-07-06 PROCEDURE — 88305 TISSUE EXAM BY PATHOLOGIST: CPT

## 2021-07-06 PROCEDURE — 2709999900 HC NON-CHARGEABLE SUPPLY: Performed by: INTERNAL MEDICINE

## 2021-07-06 PROCEDURE — 74011250636 HC RX REV CODE- 250/636: Performed by: ANESTHESIOLOGY

## 2021-07-06 PROCEDURE — 77030018712 HC DEV BLLN INFL BSC -B: Performed by: INTERNAL MEDICINE

## 2021-07-06 PROCEDURE — 74011250636 HC RX REV CODE- 250/636: Performed by: INTERNAL MEDICINE

## 2021-07-06 PROCEDURE — 76060000031 HC ANESTHESIA FIRST 0.5 HR: Performed by: INTERNAL MEDICINE

## 2021-07-06 RX ORDER — DEXTROMETHORPHAN/PSEUDOEPHED 2.5-7.5/.8
1.2 DROPS ORAL
Status: DISCONTINUED | OUTPATIENT
Start: 2021-07-06 | End: 2021-07-06 | Stop reason: HOSPADM

## 2021-07-06 RX ORDER — SODIUM CHLORIDE 0.9 % (FLUSH) 0.9 %
5-40 SYRINGE (ML) INJECTION AS NEEDED
Status: DISCONTINUED | OUTPATIENT
Start: 2021-07-06 | End: 2021-07-06 | Stop reason: HOSPADM

## 2021-07-06 RX ORDER — SODIUM CHLORIDE 9 MG/ML
75 INJECTION, SOLUTION INTRAVENOUS CONTINUOUS
Status: DISPENSED | OUTPATIENT
Start: 2021-07-06 | End: 2021-07-06

## 2021-07-06 RX ORDER — LIDOCAINE HYDROCHLORIDE 20 MG/ML
INJECTION, SOLUTION EPIDURAL; INFILTRATION; INTRACAUDAL; PERINEURAL AS NEEDED
Status: DISCONTINUED | OUTPATIENT
Start: 2021-07-06 | End: 2021-07-06 | Stop reason: HOSPADM

## 2021-07-06 RX ORDER — ATROPINE SULFATE 0.1 MG/ML
0.5 INJECTION INTRAVENOUS
Status: DISCONTINUED | OUTPATIENT
Start: 2021-07-06 | End: 2021-07-06 | Stop reason: HOSPADM

## 2021-07-06 RX ORDER — PROPOFOL 10 MG/ML
INJECTION, EMULSION INTRAVENOUS AS NEEDED
Status: DISCONTINUED | OUTPATIENT
Start: 2021-07-06 | End: 2021-07-06 | Stop reason: HOSPADM

## 2021-07-06 RX ORDER — NALOXONE HYDROCHLORIDE 0.4 MG/ML
0.4 INJECTION, SOLUTION INTRAMUSCULAR; INTRAVENOUS; SUBCUTANEOUS
Status: ACTIVE | OUTPATIENT
Start: 2021-07-06 | End: 2021-07-06

## 2021-07-06 RX ORDER — SODIUM CHLORIDE 0.9 % (FLUSH) 0.9 %
5-40 SYRINGE (ML) INJECTION EVERY 8 HOURS
Status: DISCONTINUED | OUTPATIENT
Start: 2021-07-06 | End: 2021-07-06 | Stop reason: HOSPADM

## 2021-07-06 RX ORDER — FLUMAZENIL 0.1 MG/ML
0.2 INJECTION INTRAVENOUS
Status: ACTIVE | OUTPATIENT
Start: 2021-07-06 | End: 2021-07-06

## 2021-07-06 RX ORDER — MIDAZOLAM HYDROCHLORIDE 1 MG/ML
.25-5 INJECTION, SOLUTION INTRAMUSCULAR; INTRAVENOUS
Status: ACTIVE | OUTPATIENT
Start: 2021-07-06 | End: 2021-07-06

## 2021-07-06 RX ORDER — FENTANYL CITRATE 50 UG/ML
25 INJECTION, SOLUTION INTRAMUSCULAR; INTRAVENOUS
Status: ACTIVE | OUTPATIENT
Start: 2021-07-06 | End: 2021-07-06

## 2021-07-06 RX ORDER — EPINEPHRINE 0.1 MG/ML
1 INJECTION INTRACARDIAC; INTRAVENOUS
Status: DISCONTINUED | OUTPATIENT
Start: 2021-07-06 | End: 2021-07-06 | Stop reason: HOSPADM

## 2021-07-06 RX ADMIN — SODIUM CHLORIDE 75 ML/HR: 9 INJECTION, SOLUTION INTRAVENOUS at 09:55

## 2021-07-06 RX ADMIN — LIDOCAINE HYDROCHLORIDE 100 MG: 20 INJECTION, SOLUTION EPIDURAL; INFILTRATION; INTRACAUDAL; PERINEURAL at 10:12

## 2021-07-06 RX ADMIN — PROPOFOL 150 MG: 10 INJECTION, EMULSION INTRAVENOUS at 10:24

## 2021-07-06 NOTE — H&P
Gastroenterology Outpatient History and Physical    Patient: Marisol Wilhelm. Physician: Arjun Wood MD    Chief Complaint: dysphagia  History of Present Illness: 78yo M with dysphagia with solids only. 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   He is on Pepcid    History:  Past Medical History:   Diagnosis Date    Adverse effect of anesthesia     slow to wake after lipoma removal 2020    Arthritis     BPH (benign prostatic hyperplasia) 2015    Diabetes ([address]     Type II    Enlarged prostate     Fatty liver     GERD (gastroesophageal reflux disease)     Hypertension     Ill-defined condition     lt shoulder lipoma    Lipoma     Lipoma of back 2018    left shoulderblade area    Rotator cuff tear       Past Surgical History:   Procedure Laterality Date    HX CHOLECYSTECTOMY      HX CYST REMOVAL  10/10/2017    HX HERNIA REPAIR      HX MOHS PROCEDURES      HX ORTHOPAEDIC       lt  shoulder replacement    HX PREMALIG/BENIGN SKIN LESION EXCISION  10/10/2017    Excision left shoulder cyst- Bertina Alpers, MD    HX ROTATOR CUFF REPAIR      rt shoulder      Social History     Socioeconomic History    Marital status:      Spouse name: Not on file    Number of children: Not on file    Years of education: Not on file    Highest education level: Not on file   Tobacco Use    Smoking status: Former Smoker     Packs/day: 1.00     Years: 40.00     Pack years: 40.00     Quit date: 10/5/2009     Years since quittin.7    Smokeless tobacco: Never Used   Vaping Use    Vaping Use: Never used   Substance and Sexual Activity    Alcohol use: No     Alcohol/week: 0.0 standard drinks    Drug use: No    Sexual activity: Yes     Partners: Female     Social Determinants of Health     Financial Resource Strain:     Difficulty of Paying Living Expenses:    Food Insecurity:     Worried About Running Out of Food in the Last Year:  Ran Out of Food in the Last Year:    Transportation Needs:     Lack of Transportation (Medical):  Lack of Transportation (Non-Medical):    Physical Activity:     Days of Exercise per Week:     Minutes of Exercise per Session:    Stress:     Feeling of Stress :    Social Connections:     Frequency of Communication with Friends and Family:     Frequency of Social Gatherings with Friends and Family:     Attends [employer] Services:     Active Member of Clubs or Organizations:     Attends Club or Organization Meetings:     Marital Status:       Family History   Problem Relation Age of Onset    Cancer Mother         bone & gland    Heart Disease Father     Cancer Sister         breast & ovarian    Heart Disease Sister     Diabetes Brother     Coronary Artery Disease Brother     Cancer Niece         bone & brain -  age 43      Patient Active Problem List   Diagnosis Code    GERD (gastroesophageal reflux disease) K21.9    BPH (benign prostatic hyperplasia) N40.0    Generalized osteoarthritis of multiple sites M15.9    Elevated blood pressure reading without diagnosis of hypertension R03.0    Anterior chest wall pain R07.89    Dyspnea on exertion R06.00    BMI 33.0-33.9,adult Z68.33    Hyperlipidemia E78.5    Essential hypertension I10    Lipoma of back D17.1    Gastroesophageal reflux disease without esophagitis K21.9    Severe obesity ([address] E66.01    Fatty liver K76.0       Allergies: Allergies   Allergen Reactions    Lisinopril Cough    Pcn [Penicillins] Hives    Tramadol Nausea and Vomiting     Other reaction(s): Nausea and Vomiting    Wellbutrin [Bupropion] Other (comments)     Double vision     Medications:   Prior to Admission medications    Medication Sig Start Date End Date Taking?  Authorizing Provider   metFORMIN (GLUCOPHAGE) 500 mg tablet TAKE 1 TABLET BY MOUTH EVERY DAY IN THE MORNING WITH BREAKFAST 3/18/21  Yes Jessica Don MD   losartan (COZAAR) 25 mg tablet TAKE 2 TABLETS BY MOUTH EVERY DAY (THIS REPLACES LISINOPRIL) 2/15/21  Yes Jarred Jones MD   atorvastatin (LIPITOR) 40 mg tablet TAKE 1 TABLET BY MOUTH EVERY DAY 2/14/21  Yes Meliza Don MD   diclofenac EC (VOLTAREN) 75 mg EC tablet TAKE 1 TABLET BY MOUTH DAILY IF NEEDED FOR ARTHRITIS 11/10/20  Yes Jessica Don MD   hydroCHLOROthiazide (HYDRODIURIL) 25 mg tablet Take 1 Tab by mouth daily. 8/11/20  Yes Jessica Don MD   cetirizine (ZYRTEC) 10 mg tablet Take  by mouth. Yes Provider, Historical   famotidine (PEPCID) 40 mg tablet TAKE 1 TABLET BY MOUTH EVERY DAY  Patient taking differently: daily. TAKE 1 TABLET BY MOUTH EVERY DAY 10/16/18  Yes Maria Esther Rubin MD   loratadine (CLARITIN) 10 mg tablet Take 10 mg by mouth daily as needed for Allergies. Yes Provider, Historical   finasteride (PROSCAR) 5 mg tablet Take 5 mg by mouth daily. Yes Provider, Historical   doxazosin (CARDURA) 4 mg tablet Take 4 mg by mouth daily. Yes Provider, Historical   ciprofloxacin HCl (CIPRO) 500 mg tablet Take 500 mg by mouth two (2) times daily as needed. Prostate 10/15/20   Provider, Historical     Physical Exam:   Vital Signs: Blood pressure 138/83, pulse 85, temperature 98 °F (36.7 °C), resp. rate 19, height 5' 8\" (1.727 m), weight 108.4 kg (239 lb), SpO2 95 %.   General: well developed, well nourished   HEENT: unremarkable   Heart: regular rhythm no mumur    Lungs: clear   Abdominal:  benign   Neurological: unremarkable   Extremities: no edema     Findings/Diagnosis: dysphagia  Plan of Care/Planned Procedure: egd with conscious/deep sedation    Signed:  Lyric Mcnamara MD 7/6/2021

## 2021-07-06 NOTE — PROGRESS NOTES
Endoscope was pre-cleaned at the bedside immediately following procedure by Rubi Frias ET    Medications     lidocaine (PF) 2% (mg)     Date/Time   Rate/Dose/Volume Action Route Admin User Audit   07/06/21  1012  100 mg Given IntraVENous Yasir ROQUE DO              0.9% sodium chloride infusion (mL/hr)     Date/Time   Rate/Dose/Volume Action Route Admin User Audit   07/06/21  1010  75 mL/hr Rate Verify IntraVENous Jeremiah Yu MD                  .

## 2021-07-06 NOTE — DISCHARGE INSTRUCTIONS
Shona Bowen  [id number]  1945    EGD DISCHARGE INSTRUCTIONS  Discomfort:  Sore throat- throat lozenges or warm salt water gargle  redness at IV site- apply warm compress to area; if redness or soreness persist- contact your physician  Gaseous discomfort- walking, belching will help relieve any discomfort  You may not operate a vehicle for 12 hours  You may not engage in an occupation involving machinery or appliances for rest of today  You may not drink alcoholic beverages for at least 12 hours  Avoid making any critical decisions for at least 24 hour  DIET  You may have minimal sips at this time-- do not eat or drink for two hours. You may eat and drink after 1045AM  You may resume your regular diet - however -  remember your colon is empty and a heavy meal will produce gas. Avoid these foods:  vegetables, fried / greasy foods, carbonated drinks    MEDICATIONS:        ACTIVITY  You may resume your normal daily activities until tomorrow AM;  Spend the remainder of the day resting -  avoid any strenuous activity. CALL M.D.   ANY SIGN OF   Increasing pain, nausea, vomiting  Abdominal distension (swelling)  New increased bleeding (oral or rectal)  Fever (chills)  Pain in chest area  Bloody discharge from nose or mouth  Shortness of breath    IMPRESSION:  -Normal appearance of esophageal mucosa without stricture, mass, or obstruction; biopsied followed by empiric dilatation with 54FR Savary dilator over wire with endoscopic evaluation afterwards  -Esophageal dyskinesia (dysmotility)  -Small 3cm hiatal hernia from 42-45cm  -Normal stomach mucosa  -Normal duodenal mucosa    Follow-up Instructions:   Call Dr. Jade Gutierrez for the results of procedure / biopsy in 7-10 days   Telephone # [phone]    Lyric Mcnamara MD

## 2021-07-06 NOTE — ANESTHESIA POSTPROCEDURE EVALUATION
Procedure(s):  ESOPHAGOGASTRODUODENOSCOPY (EGD) rapid covid  ESOPHAGEAL DILATION  ESOPHAGOGASTRODUODENAL (EGD) BIOPSY. total IV anesthesia    Anesthesia Post Evaluation        Patient location during evaluation: PACU  Note status: Adequate. Level of consciousness: responsive to verbal stimuli and sleepy but conscious  Pain management: satisfactory to patient  Airway patency: patent  Anesthetic complications: no  Cardiovascular status: acceptable  Respiratory status: acceptable  Hydration status: acceptable  Comments: +Post-Anesthesia Evaluation and Assessment    Patient: Dana Dillon MRN: [id number]  SSN: [id number]   YOB: 1945  Age: 77 y.o. Sex: male      Cardiovascular Function/Vital Signs    /73   Pulse 92   Temp 36.7 °C (98.1 °F)   Resp 23   Ht 5' 8\" (1.727 m)   Wt 108.4 kg (239 lb)   SpO2 92%   BMI 36.34 kg/m²     Patient is status post Procedure(s):  ESOPHAGOGASTRODUODENOSCOPY (EGD) rapid covid  ESOPHAGEAL DILATION  ESOPHAGOGASTRODUODENAL (EGD) BIOPSY. Nausea/Vomiting: Controlled. Postoperative hydration reviewed and adequate. Pain:  Pain Scale 1: Numeric (0 - 10) (07/06/21 1042)  Pain Intensity 1: 0 (07/06/21 1042)   Managed. Neurological Status: At baseline. Mental Status and Level of Consciousness: Arousable. Pulmonary Status:   O2 Device: None (Room air) (07/06/21 1042)   Adequate oxygenation and airway patent. Complications related to anesthesia: None    Post-anesthesia assessment completed. No concerns. Signed By: Luis Manuel Catherine DO    7/6/2021  Post anesthesia nausea and vomiting:  controlled      INITIAL Post-op Vital signs:   Vitals Value Taken Time   /73 07/06/21 1042   Temp     Pulse 86 07/06/21 1056   Resp 21 07/06/21 1056   SpO2 93 % 07/06/21 1052   Vitals shown include unvalidated device data.

## 2021-07-06 NOTE — ANESTHESIA PREPROCEDURE EVALUATION
Relevant Problems   RESPIRATORY SYSTEM   (+) Dyspnea on exertion      CARDIOVASCULAR   (+) Essential hypertension      GASTROINTESTINAL   (+) Fatty liver   (+) GERD (gastroesophageal reflux disease)   (+) Gastroesophageal reflux disease without esophagitis      ENDOCRINE   (+) Severe obesity (HCC)       Anesthetic History          Comments: Slow to wake     Review of Systems / Medical History  Patient summary reviewed, nursing notes reviewed and pertinent labs reviewed    Pulmonary  Within defined limits                 Neuro/Psych   Within defined limits           Cardiovascular    Hypertension              Exercise tolerance: <4 METS  Comments: Stable ROQUE   GI/Hepatic/Renal     GERD (takes med prn)      Liver disease (fatty liver)    Comments: Dysphagia Endo/Other    Diabetes    Obesity and arthritis     Other Findings            Physical Exam    Airway  Mallampati: IV  TM Distance: 4 - 6 cm  Neck ROM: short neck       Comments: Full face Cardiovascular    Rhythm: regular  Rate: normal         Dental  No notable dental hx       Pulmonary  Breath sounds clear to auscultation               Abdominal  GI exam deferred       Other Findings            Anesthetic Plan    ASA: 3  Anesthesia type: general and total IV anesthesia          Induction: Intravenous  Anesthetic plan and risks discussed with: Patient      COVID test preop negative

## 2021-07-06 NOTE — PROGRESS NOTES
[address] July 6, 2021       RE: Caroll Felty. To Whom It May Concern,    This is to certify that Caroll Felty. may may return to work on 07/07/2021. Please feel free to contact my office if you have any questions or concerns. Thank you for your assistance in this matter.       Sincerely,  Pardeep Willingham

## 2021-07-06 NOTE — ROUTINE PROCESS
She[place]on [place].  1945  [id number]    Situation:  Verbal report received from: Cristobal Brewer  Procedure: Procedure(s):  ESOPHAGOGASTRODUODENOSCOPY (EGD) rapid covid  ESOPHAGEAL DILATION  ESOPHAGOGASTRODUODENAL (EGD) BIOPSY    Background:    Preoperative diagnosis: DYSPHAGIA  Postoperative diagnosis: dyphagia, hiatal hernia, esophageal dyskinesia     :  (s): Endoscopy Technician-1: Kerline Louis  Endoscopy RN-1: Beronica Jerry RN    Specimens:   ID Type Source Tests Collected by Time Destination   1 : [address]  Kimmy Levi MD 7/6/2021 1016 Pathology     H. Pylori  no    Assessment:  Intra-procedure medications       Anesthesia gave intra-procedure sedation and medications, see anesthesia flow sheet yes    Intravenous fluids: NS@ KVO     Vital signs stable  yes    Abdominal assessment: round and soft  yes    Recommendation:  Discharge patient per MD ordre yes  Family or Friend Wife  And son  Permission to share finding with family or friend yes

## 2021-07-27 ENCOUNTER — OFFICE VISIT (OUTPATIENT)
Dept: FAMILY MEDICINE CLINIC | Age: 76
End: 2021-07-27
Payer: MEDICARE

## 2021-07-27 VITALS
RESPIRATION RATE: 20 BRPM | HEIGHT: 68 IN | BODY MASS INDEX: 36.22 KG/M2 | OXYGEN SATURATION: 92 % | DIASTOLIC BLOOD PRESSURE: 70 MMHG | WEIGHT: 239 LBS | SYSTOLIC BLOOD PRESSURE: 129 MMHG | HEART RATE: 79 BPM | TEMPERATURE: 97.3 F

## 2021-07-27 DIAGNOSIS — R51.9 INTRACTABLE EPISODIC HEADACHE, UNSPECIFIED HEADACHE TYPE: ICD-10-CM

## 2021-07-27 DIAGNOSIS — E66.01 SEVERE OBESITY (BMI 35.0-35.9 WITH COMORBIDITY) (HCC): ICD-10-CM

## 2021-07-27 DIAGNOSIS — K21.9 GASTROESOPHAGEAL REFLUX DISEASE WITHOUT ESOPHAGITIS: ICD-10-CM

## 2021-07-27 DIAGNOSIS — I10 ESSENTIAL HYPERTENSION: ICD-10-CM

## 2021-07-27 DIAGNOSIS — R79.89 ABNORMAL TSH: Primary | ICD-10-CM

## 2021-07-27 DIAGNOSIS — I49.9 IRREGULAR HEART RATE: ICD-10-CM

## 2021-07-27 DIAGNOSIS — E11.9 TYPE 2 DIABETES MELLITUS WITHOUT COMPLICATION, WITHOUT LONG-TERM CURRENT USE OF INSULIN (HCC): ICD-10-CM

## 2021-07-27 DIAGNOSIS — M15.9 GENERALIZED OSTEOARTHRITIS OF MULTIPLE SITES: ICD-10-CM

## 2021-07-27 PROCEDURE — 99214 OFFICE O/P EST MOD 30 MIN: CPT | Performed by: FAMILY MEDICINE

## 2021-07-27 PROCEDURE — 93010 ELECTROCARDIOGRAM REPORT: CPT | Performed by: FAMILY MEDICINE

## 2021-07-27 RX ORDER — DICLOFENAC SODIUM 75 MG/1
TABLET, DELAYED RELEASE ORAL
Qty: 90 TABLET | Refills: 1 | Status: SHIPPED | OUTPATIENT
Start: 2021-07-27 | End: 2021-11-09 | Stop reason: SDUPTHER

## 2021-07-27 RX ORDER — METFORMIN HYDROCHLORIDE 500 MG/1
TABLET ORAL
Qty: 90 TABLET | Refills: 1 | Status: SHIPPED | OUTPATIENT
Start: 2021-07-27 | End: 2021-07-28 | Stop reason: SDUPTHER

## 2021-07-27 RX ORDER — FAMOTIDINE 40 MG/1
TABLET, FILM COATED ORAL
Qty: 90 TABLET | Refills: 1 | Status: SHIPPED | OUTPATIENT
Start: 2021-07-27 | End: 2021-11-09 | Stop reason: SDUPTHER

## 2021-07-27 RX ORDER — HYDROCHLOROTHIAZIDE 25 MG/1
25 TABLET ORAL DAILY
Qty: 90 TABLET | Refills: 1 | Status: SHIPPED | OUTPATIENT
Start: 2021-07-27 | End: 2021-11-09 | Stop reason: SDUPTHER

## 2021-07-27 RX ORDER — LOSARTAN POTASSIUM 25 MG/1
TABLET ORAL
Qty: 180 TABLET | Refills: 1 | Status: SHIPPED | OUTPATIENT
Start: 2021-07-27 | End: 2021-11-09 | Stop reason: SDUPTHER

## 2021-07-27 NOTE — PROGRESS NOTES
Yefri Garcia. is a 77 y.o. male who presents to the office today with the following:  Chief Complaint   Patient presents with    Hypertension    Diabetes    Eye Problem       HPI  Had Esophagus stretched  And was noted to have Hiatal hernia  Taking Pepcid every day and needs refill  Swallowing is better since then    DM  No BS checks  Needs refill of Metformin    Obesity  Gained more weight    HTN  BP is good  Needs refills    Also needs refill of Diclofenac  Taking one a day for pain in hand    Abnormal TSH  Here for recheck    HM reviewed  Pt refusing CT chest and Colonoscopy    Wife noticed irregular pulse for the last week    Review of Systems   Constitutional: Negative for weight loss. HENT: Negative for congestion. Respiratory: Positive for shortness of breath (not unusual). Negative for cough. Cardiovascular: Negative for chest pain. Gastrointestinal: Negative for heartburn. Musculoskeletal: Positive for joint pain. Skin:        Wart right wrist   Neurological: Positive for headaches (left temple). Negative for dizziness. See HPI.     Past Medical History:   Diagnosis Date    Adverse effect of anesthesia     slow to wake after lipoma removal 1/2020    Arthritis     BPH (benign prostatic hyperplasia) 1/5/2015    Diabetes ([facility] [address]     Type II    Enlarged prostate     Fatty liver     GERD (gastroesophageal reflux disease)     Hiatal hernia     Hypertension     Ill-defined condition 2017    lt shoulder lipoma    Lipoma     Lipoma of back 11/2018    left shoulderblade area    Rotator cuff tear        Past Surgical History:   Procedure Laterality Date    HX CHOLECYSTECTOMY  2001    HX CYST REMOVAL  10/10/2017    HX HERNIA REPAIR  2001    HX MOHS PROCEDURES      HX ORTHOPAEDIC  2015     lt  shoulder replacement    HX PREMALIG/BENIGN SKIN LESION EXCISION  10/10/2017    Excision left shoulder cyst- Chiquis Crump MD    HX ROTATOR CUFF REPAIR      rt shoulder    UPPER GI ENDOSCOPY,BIOPSY  2021         UPPER GI ENDOSCOPY,DILATN W GUIDE  2021            Allergies   Allergen Reactions    Lisinopril Cough    Pcn [Penicillins] Hives    Tramadol Nausea and Vomiting     Other reaction(s): Nausea and Vomiting    Wellbutrin [Bupropion] Other (comments)     Double vision       Current Outpatient Medications   Medication Sig    diclofenac EC (VOLTAREN) 75 mg EC tablet TAKE 1 TABLET BY MOUTH DAILY IF NEEDED FOR ARTHRITIS    metFORMIN (GLUCOPHAGE) 500 mg tablet TAKE 1 TABLET BY MOUTH EVERY DAY IN THE MORNING WITH BREAKFAST    losartan (COZAAR) 25 mg tablet TAKE 2 TABLETS BY MOUTH EVERY DAY (THIS REPLACES LISINOPRIL)    hydroCHLOROthiazide (HYDRODIURIL) 25 mg tablet Take 1 Tablet by mouth daily.  famotidine (PEPCID) 40 mg tablet TAKE 1 TABLET BY MOUTH EVERY DAY    atorvastatin (LIPITOR) 40 mg tablet TAKE 1 TABLET BY MOUTH EVERY DAY    ciprofloxacin HCl (CIPRO) 500 mg tablet Take 500 mg by mouth two (2) times daily as needed. Prostate    cetirizine (ZYRTEC) 10 mg tablet Take  by mouth.  finasteride (PROSCAR) 5 mg tablet Take 5 mg by mouth daily.  doxazosin (CARDURA) 4 mg tablet Take 4 mg by mouth daily.  loratadine (CLARITIN) 10 mg tablet Take 10 mg by mouth daily as needed for Allergies. (Patient not taking: Reported on 2021)     No current facility-administered medications for this visit.        Social History     Socioeconomic History    Marital status:      Spouse name: Not on file    Number of children: Not on file    Years of education: Not on file    Highest education level: Not on file   Tobacco Use    Smoking status: Former Smoker     Packs/day: 1.00     Years: 40.00     Pack years: 40.00     Quit date: 10/5/2009     Years since quittin.8    Smokeless tobacco: Never Used   Vaping Use    Vaping Use: Never used   Substance and Sexual Activity    Alcohol use: No     Alcohol/week: 0.0 standard drinks    Drug use: No    Sexual activity: Yes     Partners: Female     Social Determinants of Health     Financial Resource Strain:     Difficulty of Paying Living Expenses:    Food Insecurity:     Worried About Running Out of Food in the Last Year:     [address] in the Last Year:    Transportation Needs:     Lack of Transportation (Medical):  Lack of Transportation (Non-Medical):    Physical Activity:     Days of Exercise per Week:     Minutes of Exercise per Session:    Stress:     Feeling of Stress :    Social Connections:     Frequency of Communication with Friends and Family:     Frequency of Social Gatherings with Friends and Family:     Attends [employer] Services:     Active Member of Clubs or Organizations:     Attends Club or Organization Meetings:     Marital Status:        Family History   Problem Relation Age of Onset    Cancer Mother         bone & gland    Heart Disease Father     Cancer Sister         breast & ovarian    Heart Disease Sister     Diabetes Brother     Coronary Artery Disease Brother     Cancer Niece         bone & brain -  age 43         Physical Exam:  Visit Vitals  /70 (BP 1 Location: Left upper arm, BP Patient Position: Sitting, BP Cuff Size: Adult)   Pulse 79   Temp 97.3 °F (36.3 °C) (Oral)   Resp 20   Ht 5' 8\" (1.727 m)   Wt 239 lb (108.4 kg)   SpO2 92%   BMI 36.34 kg/m²     Physical Exam  Vitals and nursing note reviewed. Constitutional:       Appearance: He is obese. HENT:      Head: Normocephalic and atraumatic. Right Ear: Tympanic membrane, ear canal and external ear normal.      Left Ear: Tympanic membrane, ear canal and external ear normal.   Eyes:      Extraocular Movements: Extraocular movements intact. Conjunctiva/sclera: Conjunctivae normal.   Cardiovascular:      Rate and Rhythm: Normal rate. Rhythm irregular. Pulses: Normal pulses. Heart sounds: Normal heart sounds.    Pulmonary:      Effort: Pulmonary effort is normal.      Breath sounds: Normal breath sounds. Abdominal:      General: There is distension (d/t obesity). Palpations: Abdomen is soft. Tenderness: There is no abdominal tenderness. There is no right CVA tenderness, left CVA tenderness or guarding. Musculoskeletal:      Right lower leg: No edema. Lymphadenopathy:      Cervical: No cervical adenopathy. Skin:     General: Skin is warm and dry. Neurological:      Mental Status: He is alert and oriented to person, place, and time. Psychiatric:         Mood and Affect: Mood normal.         Behavior: Behavior normal.       EKG showed SR with PAC    Assessment/Plan:    ICD-10-CM ICD-9-CM    1. Abnormal TSH  R79.89 790.6 TSH 3RD GENERATION   2. Gastroesophageal reflux disease without esophagitis  K21.9 530.81 famotidine (PEPCID) 40 mg tablet   3. Severe obesity (BMI 35.0-35.9 with comorbidity) ([facility])  E66.01 278.01     Z68.35 V85.35    4. Type 2 diabetes mellitus without complication, without long-term current use of insulin ([facility])  E11.9 250.00 TSH 3RD GENERATION      metFORMIN (GLUCOPHAGE) 500 mg tablet      HEMOGLOBIN A1C WITH EAG   5. Generalized osteoarthritis of multiple sites  M15.9 715.09 diclofenac EC (VOLTAREN) 75 mg EC tablet   6. Intractable episodic headache, unspecified headache type  R51.9 784.0 SED RATE (ESR)   7. Irregular heart rate  I49.9 427.9 AMB POC EKG ROUTINE W/ 12 LEADS, INTER & REP   8.  Essential hypertension  I10 401.9 hydroCHLOROthiazide (HYDRODIURIL) 25 mg tablet      METABOLIC PANEL, COMPREHENSIVE     Advised to start EC ASA 81 mg      Blair Hubbard MD

## 2021-07-27 NOTE — PROGRESS NOTES
1. Have you been to the ER, urgent care clinic since your last visit? Hospitalized /since your last visit? Yes - 7/6/21 - Endoscopy Dr Jade Gutierrez       2. Have you seen or consulted any other health care providers outside of the [address] [place] [address] since your last visit? Include any pap smears or colon screening.  No

## 2021-07-28 DIAGNOSIS — E11.9 TYPE 2 DIABETES MELLITUS WITHOUT COMPLICATION, WITHOUT LONG-TERM CURRENT USE OF INSULIN (HCC): ICD-10-CM

## 2021-07-28 LAB
ALBUMIN SERPL-MCNC: 3.9 G/DL (ref 3.5–5)
ALBUMIN/GLOB SERPL: 1.3 {RATIO} (ref 1.1–2.2)
ALP SERPL-CCNC: 88 U/L (ref 45–117)
ALT SERPL-CCNC: 69 U/L (ref 12–78)
ANION GAP SERPL CALC-SCNC: 8 MMOL/L (ref 5–15)
AST SERPL-CCNC: 46 U/L (ref 15–37)
BILIRUB SERPL-MCNC: 0.5 MG/DL (ref 0.2–1)
BUN SERPL-MCNC: 30 MG/DL (ref 6–20)
BUN/CREAT SERPL: 30 (ref 12–20)
CALCIUM SERPL-MCNC: 9.3 MG/DL (ref 8.5–10.1)
CHLORIDE SERPL-SCNC: 100 MMOL/L (ref 97–108)
CO2 SERPL-SCNC: 30 MMOL/L (ref 21–32)
CREAT SERPL-MCNC: 1.01 MG/DL (ref 0.7–1.3)
EST. AVERAGE GLUCOSE BLD GHB EST-MCNC: 171 MG/DL
GLOBULIN SER CALC-MCNC: 3 G/DL (ref 2–4)
GLUCOSE SERPL-MCNC: 123 MG/DL (ref 65–100)
HBA1C MFR BLD: 7.6 % (ref 4–5.6)
POTASSIUM SERPL-SCNC: 3.7 MMOL/L (ref 3.5–5.1)
PROT SERPL-MCNC: 6.9 G/DL (ref 6.4–8.2)
SODIUM SERPL-SCNC: 138 MMOL/L (ref 136–145)
TSH SERPL DL<=0.05 MIU/L-ACNC: 2.51 UIU/ML (ref 0.36–3.74)

## 2021-07-28 RX ORDER — METFORMIN HYDROCHLORIDE 500 MG/1
500 TABLET ORAL 2 TIMES DAILY WITH MEALS
Qty: 180 TABLET | Refills: 0 | Status: SHIPPED | OUTPATIENT
Start: 2021-07-28 | End: 2021-10-25

## 2021-07-28 NOTE — PROGRESS NOTES
Call pt, the electrolytes are normal  The kidney tests are normal  The liver tests are better, only one is still up  The HbA1C is 7.6, a little higher, I am increasing the Metformin to twice a day with meals and recheck in 3-4 mo  The thyroid test is normal now

## 2021-10-25 DIAGNOSIS — E11.9 TYPE 2 DIABETES MELLITUS WITHOUT COMPLICATION, WITHOUT LONG-TERM CURRENT USE OF INSULIN (HCC): ICD-10-CM

## 2021-10-25 RX ORDER — METFORMIN HYDROCHLORIDE 500 MG/1
500 TABLET ORAL 2 TIMES DAILY WITH MEALS
Qty: 60 TABLET | Refills: 0 | Status: SHIPPED | OUTPATIENT
Start: 2021-10-25 | End: 2021-11-09

## 2021-10-27 NOTE — TELEPHONE ENCOUNTER
I have called and talked to this patient, the call was transferred to the PSRs and an appointment was made.

## 2021-11-09 ENCOUNTER — OFFICE VISIT (OUTPATIENT)
Dept: FAMILY MEDICINE CLINIC | Age: 76
End: 2021-11-09
Payer: MEDICARE

## 2021-11-09 VITALS
HEART RATE: 82 BPM | WEIGHT: 237 LBS | DIASTOLIC BLOOD PRESSURE: 64 MMHG | RESPIRATION RATE: 20 BRPM | BODY MASS INDEX: 35.92 KG/M2 | HEIGHT: 68 IN | TEMPERATURE: 97.3 F | OXYGEN SATURATION: 92 % | SYSTOLIC BLOOD PRESSURE: 119 MMHG

## 2021-11-09 DIAGNOSIS — K21.9 GASTROESOPHAGEAL REFLUX DISEASE WITHOUT ESOPHAGITIS: ICD-10-CM

## 2021-11-09 DIAGNOSIS — M15.9 GENERALIZED OSTEOARTHRITIS OF MULTIPLE SITES: ICD-10-CM

## 2021-11-09 DIAGNOSIS — I10 ESSENTIAL HYPERTENSION: ICD-10-CM

## 2021-11-09 DIAGNOSIS — E11.9 TYPE 2 DIABETES MELLITUS WITHOUT COMPLICATION, WITHOUT LONG-TERM CURRENT USE OF INSULIN (HCC): Primary | ICD-10-CM

## 2021-11-09 DIAGNOSIS — E78.5 HYPERLIPIDEMIA, UNSPECIFIED HYPERLIPIDEMIA TYPE: ICD-10-CM

## 2021-11-09 DIAGNOSIS — Z00.00 MEDICARE ANNUAL WELLNESS VISIT, SUBSEQUENT: ICD-10-CM

## 2021-11-09 LAB — HBA1C MFR BLD HPLC: 8.3 %

## 2021-11-09 PROCEDURE — G0439 PPPS, SUBSEQ VISIT: HCPCS | Performed by: FAMILY MEDICINE

## 2021-11-09 PROCEDURE — 36415 COLL VENOUS BLD VENIPUNCTURE: CPT | Performed by: FAMILY MEDICINE

## 2021-11-09 PROCEDURE — 99214 OFFICE O/P EST MOD 30 MIN: CPT | Performed by: FAMILY MEDICINE

## 2021-11-09 RX ORDER — HYDROCHLOROTHIAZIDE 25 MG/1
25 TABLET ORAL DAILY
Qty: 90 TABLET | Refills: 1 | Status: SHIPPED | OUTPATIENT
Start: 2021-11-09 | End: 2022-06-09 | Stop reason: SDUPTHER

## 2021-11-09 RX ORDER — FAMOTIDINE 40 MG/1
TABLET, FILM COATED ORAL
Qty: 90 TABLET | Refills: 1 | Status: SHIPPED | OUTPATIENT
Start: 2021-11-09 | End: 2022-06-09 | Stop reason: SDUPTHER

## 2021-11-09 RX ORDER — LOSARTAN POTASSIUM 25 MG/1
TABLET ORAL
Qty: 180 TABLET | Refills: 1 | Status: SHIPPED | OUTPATIENT
Start: 2021-11-09 | End: 2022-06-09 | Stop reason: SDUPTHER

## 2021-11-09 RX ORDER — METFORMIN HYDROCHLORIDE 1000 MG/1
1000 TABLET ORAL 2 TIMES DAILY WITH MEALS
Qty: 180 TABLET | Refills: 1 | Status: SHIPPED | OUTPATIENT
Start: 2021-11-09 | End: 2022-05-23

## 2021-11-09 RX ORDER — DICLOFENAC SODIUM 75 MG/1
TABLET, DELAYED RELEASE ORAL
Qty: 90 TABLET | Refills: 1 | Status: SHIPPED | OUTPATIENT
Start: 2021-11-09 | End: 2022-06-09 | Stop reason: SDUPTHER

## 2021-11-09 RX ORDER — ATORVASTATIN CALCIUM 40 MG/1
40 TABLET, FILM COATED ORAL DAILY
Qty: 90 TABLET | Refills: 1 | Status: SHIPPED | OUTPATIENT
Start: 2021-11-09 | End: 2022-06-09 | Stop reason: SDUPTHER

## 2021-11-09 NOTE — PROGRESS NOTES
11/9/2021      Chief Complaint   Patient presents with    Annual Wellness Visit         Diabetes    Hypertension    Ear Pain     Left x 3 days          History of Present Illness:         is a 77 y.o. male to follow up his DM, HTN, lipids. Having some L ear pain over last three days which is sharp and shooting lasting a few minutes to as long as an hour. No URI sxs, no rash. Increased metformin in summer to bid due to rising HgbA1c. Allergies   Allergen Reactions    Lisinopril Cough    Pcn [Penicillins] Hives    Tramadol Nausea and Vomiting     Other reaction(s): Nausea and Vomiting    Wellbutrin [Bupropion] Other (comments)     Double vision       Current Outpatient Medications   Medication Sig    metFORMIN (GLUCOPHAGE) 500 mg tablet TAKE 1 TABLET BY MOUTH TWO (2) TIMES DAILY (WITH MEALS).  atorvastatin (LIPITOR) 40 mg tablet TAKE 1 TABLET BY MOUTH EVERY DAY    diclofenac EC (VOLTAREN) 75 mg EC tablet TAKE 1 TABLET BY MOUTH DAILY IF NEEDED FOR ARTHRITIS    losartan (COZAAR) 25 mg tablet TAKE 2 TABLETS BY MOUTH EVERY DAY (THIS REPLACES LISINOPRIL)    hydroCHLOROthiazide (HYDRODIURIL) 25 mg tablet Take 1 Tablet by mouth daily.  famotidine (PEPCID) 40 mg tablet TAKE 1 TABLET BY MOUTH EVERY DAY    cetirizine (ZYRTEC) 10 mg tablet Take  by mouth.  finasteride (PROSCAR) 5 mg tablet Take 5 mg by mouth daily.  doxazosin (CARDURA) 4 mg tablet Take 4 mg by mouth daily.  ciprofloxacin HCl (CIPRO) 500 mg tablet Take 500 mg by mouth two (2) times daily as needed. Prostate (Patient not taking: Reported on 11/9/2021)    loratadine (CLARITIN) 10 mg tablet Take 10 mg by mouth daily as needed for Allergies. (Patient not taking: Reported on 7/27/2021)     No current facility-administered medications for this visit.            Physical Examination:    Visit Vitals  /64 (BP 1 Location: Left upper arm, BP Patient Position: Sitting, BP Cuff Size: Adult)   Pulse 82   Temp 97.3 °F (36.3 °C) (Oral)   Resp 20   Ht 5' 8\" (1.727 m)   Wt 237 lb (107.5 kg)   SpO2 92%   BMI 36.04 kg/m²      General:  Alert, cooperative, no distress. HEENT:  Normocephalic, without obvious abnormality, atraumatic. Conjunctivae/corneas clear. Pupils equal, round, reactive to light. Extraocular movements intact. TMs and external canals normal bilaterally. Nasal mucosa and oropharynx clear. Lungs: Clear to auscultation bilaterally. Chest wall:  No tenderness or deformity. Heart:  Regular rate and rhythm, S1, S2 normal, no murmur, click, rub, or gallop. Abdomen:   Soft, non-tender. Bowel sounds normal. No masses. No organomegaly. Extremities: Extremities normal, atraumatic, no cyanosis or edema. Pulses: 2+ and symmetric all extremities. Skin: Skin color, texture, turgor normal. No rashes or lesions. Lymph nodes: Cervical, supraclavicular, and axillary nodes normal.   Neurologic: CNII-XII intact. Normal strength, sensation, and reflexes throughout. Diabetic Foot Exam:  Both feet are examined and demonstrate intact DP pulses. There is good capillary refill and sensation is intact. Skin is intact and there are no ulcers or sores. Results for orders placed or performed in visit on 11/09/21   AMB POC HEMOGLOBIN A1C   Result Value Ref Range    Hemoglobin A1c (POC) 8.3 %         ASSESSMENT AND PLAN    1. Type 2 diabetes mellitus without complication, without long-term current use of insulin (HCC)  Increase metformin. Recheck in three months  - AMB POC HEMOGLOBIN A1C  - MICROALBUMIN, UR, RAND W/ MICROALB/CREAT RATIO; Future  -  DIABETES FOOT EXAM  - MICROALBUMIN, UR, RAND W/ MICROALB/CREAT RATIO  - metFORMIN (GLUCOPHAGE) 1,000 mg tablet; Take 1 Tablet by mouth two (2) times daily (with meals). Dispense: 180 Tablet; Refill: 1    2. Essential hypertension  Good control  - METABOLIC PANEL, COMPREHENSIVE; Future  - CBC WITH AUTOMATED DIFF; Future  - hydroCHLOROthiazide (HYDRODIURIL) 25 mg tablet;  Take 1 Tablet by mouth daily. Dispense: 90 Tablet; Refill: 1    3. Hyperlipidemia, unspecified hyperlipidemia type    - METABOLIC PANEL, COMPREHENSIVE; Future  - LIPID PANEL; Future  - atorvastatin (LIPITOR) 40 mg tablet; Take 1 Tablet by mouth daily. Dispense: 90 Tablet; Refill: 1    4. Medicare annual wellness visit, subsequent      5. Generalized osteoarthritis of multiple sites  Fingers in R hand primarily  - diclofenac EC (VOLTAREN) 75 mg EC tablet; TAKE 1 TABLET BY MOUTH DAILY IF NEEDED FOR ARTHRITIS  Dispense: 90 Tablet; Refill: 1    6. Gastroesophageal reflux disease without esophagitis    - famotidine (PEPCID) 40 mg tablet; TAKE 1 TABLET BY MOUTH EVERY DAY  Dispense: 90 Tablet;  Refill: 1        Orders Placed This Encounter    MICROALBUMIN, UR, RAND W/ MICROALB/CREAT RATIO     Standing Status:   Future     Standing Expiration Date:   58/1/2597    METABOLIC PANEL, COMPREHENSIVE     Standing Status:   Future     Standing Expiration Date:   11/9/2022    CBC WITH AUTOMATED DIFF     Standing Status:   Future     Standing Expiration Date:   11/9/2022    LIPID PANEL     Standing Status:   Future     Standing Expiration Date:   11/9/2022    AMB POC HEMOGLOBIN A1C    HM DIABETES FOOT EXAM       RTC 3 months    Angelia Closs, MD

## 2021-11-09 NOTE — PATIENT INSTRUCTIONS
Medicare Wellness Visit, Male    The best way to live healthy is to have a lifestyle where you eat a well-balanced diet, exercise regularly, limit alcohol use, and quit all forms of tobacco/nicotine, if applicable. Regular preventive services are another way to keep healthy. Preventive services (vaccines, screening tests, monitoring & exams) can help personalize your care plan, which helps you manage your own care. Screening tests can find health problems at the earliest stages, when they are easiest to treat. [facility] follows the current, evidence-based guidelines published by the [facility] ([facility]) when recommending preventive services for our patients. Because we follow these guidelines, sometimes recommendations change over time as research supports it. (For example, a prostate screening blood test is no longer routinely recommended for men with no symptoms). Of course, you and your doctor may decide to screen more often for some diseases, based on your risk and co-morbidities (chronic disease you are already diagnosed with). Preventive services for you include:  - Medicare offers their members a free annual wellness visit, which is time for you and your primary care provider to discuss and plan for your preventive service needs. Take advantage of this benefit every year!  -All adults over age 72 should receive the recommended pneumonia vaccines. Current USPSTF guidelines recommend a series of two vaccines for the best pneumonia protection.   -All adults should have a flu vaccine yearly and tetanus vaccine every 10 years.  -All adults age 48 and older should receive the shingles vaccines (series of two vaccines).        -All adults age 38-68 who are overweight should have a diabetes screening test once every three years.   -Other screening tests & preventive services for persons with diabetes include: an eye exam to screen for diabetic retinopathy, a kidney function test, a foot exam, and stricter control over your cholesterol.   -Cardiovascular screening for adults with routine risk involves an electrocardiogram (ECG) at intervals determined by the provider.   -Colorectal cancer screening should be done for adults age 54-65 with no increased risk factors for colorectal cancer. There are a number of acceptable methods of screening for this type of cancer. Each test has its own benefits and drawbacks. Discuss with your provider what is most appropriate for you during your annual wellness visit. The different tests include: colonoscopy (considered the best screening method), a fecal occult blood test, a fecal DNA test, and sigmoidoscopy.  -All adults born between [facility] should be screened once for Hepatitis C.  -An Abdominal Aortic Aneurysm (AAA) Screening is recommended for men age 73-68 who has ever smoked in their lifetime.      Here is a list of your current Health Maintenance items (your personalized list of preventive services) with a due date:  Health Maintenance Due   Topic Date Due    Eye Exam  Never done    Colorectal Screening  Never done    Shingles Vaccine (1 of 2) Never done    Yearly Flu Vaccine (1) Never done    Albumin Urine Test  11/10/2021

## 2021-11-09 NOTE — PROGRESS NOTES
1. Have you been to the ER, urgent care clinic since your last visit? Hospitalized since your last visit? No    2. Have you seen or consulted any other health care providers outside of the [address] [place] [address] since your last visit? Include any pap smears or colon screening. No       Functional Ability:   Does the patient exhibit a steady gait? yes   How long did it take the patient to get up and walk from a sitting position? 10 seconds   Is the patient self reliant?  (ie can do own laundry, meals, household chores)  yes     Does the patient handle his/her own medications? yes     Does the patient handle his/her own money? yes     Is the patients home safe (ie good lighting, handrails on stairs and bath, etc.)? yes     Did you notice or did patient express any hearing difficulties? no     Did you notice or did patient express any vision difficulties?   no     Were distance and reading eye charts used? no       Advance Care Planning:   Patient was offered the opportunity to discuss advance care planning:  yes     Does patient have an Advance Directive:  yes   If no, did you provide information on Caring Connections? yes     ADL Assessment 11/10/2020   Feeding yourself No Help Needed   Getting from bed to chair No Help Needed   Getting dressed No Help Needed   Bathing or showering No Help Needed   Walk across the room (includes cane/walker) No Help Needed   Using the telphone No Help Needed   Taking your medications No Help Needed   Preparing meals No Help Needed   Managing money (expenses/bills) No Help Needed   Moderately strenuous housework (laundry) No Help Needed   Shopping for personal items (toiletries/medicines) No Help Needed   Shopping for groceries No Help Needed   Driving No Help Needed   Climbing a flight of stairs No Help Needed   Getting to places beyond walking distances No Help Needed       Abuse Screening Questionnaire 11/10/2020   Do you ever feel afraid of your partner?  N   Are you in a relationship with someone who physically or mentally threatens you? N   Is it safe for you to go home? Y       This is the Subsequent Medicare Annual Wellness Exam, performed 12 months or more after the Initial AWV or the last Subsequent AWV    I have reviewed the patient's medical history in detail and updated the computerized patient record. Assessment/Plan   Education and counseling provided:  Are appropriate based on today's review and evaluation    1. Type 2 diabetes mellitus without complication, without long-term current use of insulin (HCC)  -     AMB POC HEMOGLOBIN A1C  -     MICROALBUMIN, UR, RAND W/ MICROALB/CREAT RATIO; Future  -      DIABETES FOOT EXAM  2. Essential hypertension  -     METABOLIC PANEL, COMPREHENSIVE; Future  -     CBC WITH AUTOMATED DIFF; Future  3. Hyperlipidemia, unspecified hyperlipidemia type  -     METABOLIC PANEL, COMPREHENSIVE; Future  -     LIPID PANEL; Future  4.  Medicare annual wellness visit, subsequent       Depression Risk Factor Screening     3 most recent PHQ Screens 11/9/2021   PHQ Not Done -   Little interest or pleasure in doing things Not at all   Feeling down, depressed, irritable, or hopeless Not at all   Total Score PHQ 2 0   Trouble falling or staying asleep, or sleeping too much -   Feeling tired or having little energy -   Poor appetite, weight loss, or overeating -   Feeling bad about yourself - or that you are a failure or have let yourself or your family down -   Trouble concentrating on things such as school, work, reading, or watching TV -   Moving or speaking so slowly that other people could have noticed; or the opposite being so fidgety that others notice -   Thoughts of being better off dead, or hurting yourself in some way -   PHQ 9 Score -       Alcohol Risk Screen    Do you average more than 1 drink per night or more than 7 drinks a week: No    In the past three months have you have had more than 4 drinks containing alcohol on one occasion: No        Functional Ability and Level of Safety    Hearing: Hearing is good. Activities of Daily Living: The home contains: no safety equipment. Patient does total self care      Ambulation: with no difficulty     Fall Risk:  Fall Risk Assessment, last 12 mths 11/10/2020   Able to walk? Yes   Fall in past 12 months?  No      Abuse Screen:  Patient is not abused       Cognitive Screening    Has your family/caregiver stated any concerns about your memory: no     Cognitive Screening: Normal - Clock Drawing Test    Health Maintenance Due     Health Maintenance Due   Topic Date Due    Eye Exam Retinal or Dilated  Never done    Colorectal Cancer Screening Combo  Never done    Shingrix Vaccine Age 50> (1 of 2) Never done    Flu Vaccine (1) Never done    MICROALBUMIN Q1  11/10/2021       Patient Care Team   Patient Care Team:  Gabriela Franco MD as PCP - General (Family Medicine)  Gabriela Franco MD as PCP - [address] [place] [address] Provider  Jerome Truong MD as Surgeon (General Surgery)  Andrea Vizcarra MD (Cardiology)  Loreto Swenson MD as Surgeon (General Surgery)    History     Patient Active Problem List   Diagnosis Code    BPH (benign prostatic hyperplasia) N40.0    Generalized osteoarthritis of multiple sites M15.9    Elevated blood pressure reading without diagnosis of hypertension R03.0    Anterior chest wall pain R07.89    Dyspnea on exertion R06.00    BMI 33.0-33.9,adult Z68.33    Hyperlipidemia E78.5    Essential hypertension I10    Lipoma of back D17.1    Gastroesophageal reflux disease without esophagitis K21.9    Severe obesity ([address] E66.01    Fatty liver K76.0     Past Medical History:   Diagnosis Date    Adverse effect of anesthesia     slow to wake after lipoma removal 1/2020    Arthritis     BPH (benign prostatic hyperplasia) 1/5/2015    Diabetes ([address]     Type II    Enlarged prostate     Fatty liver     GERD (gastroesophageal reflux disease)     Hiatal hernia     Hypertension     Ill-defined condition 2017    lt shoulder lipoma    Lipoma     Lipoma of back 11/2018    left shoulderblade area    Rotator cuff tear       Past Surgical History:   Procedure Laterality Date    HX CHOLECYSTECTOMY  2001    HX CYST REMOVAL  10/10/2017    HX HERNIA REPAIR  2001    HX MOHS PROCEDURES      HX ORTHOPAEDIC  2015     lt  shoulder replacement    HX PREMALIG/BENIGN SKIN LESION EXCISION  10/10/2017    Excision left shoulder cyst- Diamante Moreno MD    HX ROTATOR CUFF REPAIR      rt shoulder    UPPER GI ENDOSCOPY,BIOPSY  7/6/2021         UPPER GI ENDOSCOPY,DILATN W GUIDE  7/6/2021          Current Outpatient Medications   Medication Sig Dispense Refill    metFORMIN (GLUCOPHAGE) 500 mg tablet TAKE 1 TABLET BY MOUTH TWO (2) TIMES DAILY (WITH MEALS). 60 Tablet 0    atorvastatin (LIPITOR) 40 mg tablet TAKE 1 TABLET BY MOUTH EVERY DAY 90 Tablet 0    diclofenac EC (VOLTAREN) 75 mg EC tablet TAKE 1 TABLET BY MOUTH DAILY IF NEEDED FOR ARTHRITIS 90 Tablet 1    losartan (COZAAR) 25 mg tablet TAKE 2 TABLETS BY MOUTH EVERY DAY (THIS REPLACES LISINOPRIL) 180 Tablet 1    hydroCHLOROthiazide (HYDRODIURIL) 25 mg tablet Take 1 Tablet by mouth daily. 90 Tablet 1    famotidine (PEPCID) 40 mg tablet TAKE 1 TABLET BY MOUTH EVERY DAY 90 Tablet 1    cetirizine (ZYRTEC) 10 mg tablet Take  by mouth.  finasteride (PROSCAR) 5 mg tablet Take 5 mg by mouth daily.  doxazosin (CARDURA) 4 mg tablet Take 4 mg by mouth daily.  ciprofloxacin HCl (CIPRO) 500 mg tablet Take 500 mg by mouth two (2) times daily as needed. Prostate (Patient not taking: Reported on 11/9/2021)      loratadine (CLARITIN) 10 mg tablet Take 10 mg by mouth daily as needed for Allergies.  (Patient not taking: Reported on 7/27/2021)       Allergies   Allergen Reactions    Lisinopril Cough    Pcn [Penicillins] Hives    Tramadol Nausea and Vomiting     Other reaction(s): Nausea and Vomiting    Wellbutrin [Bupropion] Other (comments)     Double vision       Family History   Problem Relation Age of Onset    Cancer Mother         bone & gland    Heart Disease Father     Cancer Sister         breast & ovarian    Heart Disease Sister     Diabetes Brother     Coronary Art Dis Brother     Cancer Niece         bone & brain -  age 43     Social History     Tobacco Use    Smoking status: Former Smoker     Packs/day: 1.00     Years: 40.00     Pack years: 40.00     Quit date: 10/5/2009     Years since quittin.1    Smokeless tobacco: Never Used   Substance Use Topics    Alcohol use: No     Alcohol/week: 0.0 standard drinks         Samuel Contreras LPN

## 2021-11-10 LAB
ALBUMIN SERPL-MCNC: 4 G/DL (ref 3.5–5)
ALBUMIN/GLOB SERPL: 1.3 {RATIO} (ref 1.1–2.2)
ALP SERPL-CCNC: 102 U/L (ref 45–117)
ALT SERPL-CCNC: 68 U/L (ref 12–78)
ANION GAP SERPL CALC-SCNC: 8 MMOL/L (ref 5–15)
AST SERPL-CCNC: 52 U/L (ref 15–37)
BASOPHILS # BLD: 0.1 K/UL (ref 0–0.1)
BASOPHILS NFR BLD: 1 % (ref 0–1)
BILIRUB SERPL-MCNC: 0.7 MG/DL (ref 0.2–1)
BUN SERPL-MCNC: 25 MG/DL (ref 6–20)
BUN/CREAT SERPL: 20 (ref 12–20)
CALCIUM SERPL-MCNC: 9.7 MG/DL (ref 8.5–10.1)
CHLORIDE SERPL-SCNC: 99 MMOL/L (ref 97–108)
CHOLEST SERPL-MCNC: 134 MG/DL
CO2 SERPL-SCNC: 30 MMOL/L (ref 21–32)
COMMENT, HOLDF: NORMAL
CREAT SERPL-MCNC: 1.22 MG/DL (ref 0.7–1.3)
CREAT UR-MCNC: 142 MG/DL
DIFFERENTIAL METHOD BLD: ABNORMAL
EOSINOPHIL # BLD: 0.2 K/UL (ref 0–0.4)
EOSINOPHIL NFR BLD: 3 % (ref 0–7)
ERYTHROCYTE [DISTWIDTH] IN BLOOD BY AUTOMATED COUNT: 15 % (ref 11.5–14.5)
GLOBULIN SER CALC-MCNC: 3 G/DL (ref 2–4)
GLUCOSE SERPL-MCNC: 198 MG/DL (ref 65–100)
HCT VFR BLD AUTO: 40.9 % (ref 36.6–50.3)
HDLC SERPL-MCNC: 26 MG/DL
HDLC SERPL: 5.2 {RATIO} (ref 0–5)
HGB BLD-MCNC: 13.2 G/DL (ref 12.1–17)
IMM GRANULOCYTES # BLD AUTO: 0 K/UL (ref 0–0.04)
IMM GRANULOCYTES NFR BLD AUTO: 1 % (ref 0–0.5)
LDLC SERPL CALC-MCNC: 33.6 MG/DL (ref 0–100)
LYMPHOCYTES # BLD: 1.8 K/UL (ref 0.8–3.5)
LYMPHOCYTES NFR BLD: 21 % (ref 12–49)
MCH RBC QN AUTO: 28.3 PG (ref 26–34)
MCHC RBC AUTO-ENTMCNC: 32.3 G/DL (ref 30–36.5)
MCV RBC AUTO: 87.8 FL (ref 80–99)
MICROALBUMIN UR-MCNC: 2.11 MG/DL
MICROALBUMIN/CREAT UR-RTO: 15 MG/G (ref 0–30)
MONOCYTES # BLD: 0.6 K/UL (ref 0–1)
MONOCYTES NFR BLD: 7 % (ref 5–13)
NEUTS SEG # BLD: 5.7 K/UL (ref 1.8–8)
NEUTS SEG NFR BLD: 67 % (ref 32–75)
NRBC # BLD: 0 K/UL (ref 0–0.01)
NRBC BLD-RTO: 0 PER 100 WBC
PLATELET # BLD AUTO: 253 K/UL (ref 150–400)
PMV BLD AUTO: 11.2 FL (ref 8.9–12.9)
POTASSIUM SERPL-SCNC: 3.6 MMOL/L (ref 3.5–5.1)
PROT SERPL-MCNC: 7 G/DL (ref 6.4–8.2)
RBC # BLD AUTO: 4.66 M/UL (ref 4.1–5.7)
SAMPLES BEING HELD,HOLD: NORMAL
SODIUM SERPL-SCNC: 137 MMOL/L (ref 136–145)
TRIGL SERPL-MCNC: 372 MG/DL (ref ?–150)
VLDLC SERPL CALC-MCNC: 74.4 MG/DL
WBC # BLD AUTO: 8.4 K/UL (ref 4.1–11.1)

## 2021-11-22 ENCOUNTER — TELEPHONE (OUTPATIENT)
Dept: FAMILY MEDICINE CLINIC | Age: 76
End: 2021-11-22

## 2022-02-08 ENCOUNTER — OFFICE VISIT (OUTPATIENT)
Dept: FAMILY MEDICINE CLINIC | Age: 77
End: 2022-02-08
Payer: MEDICARE

## 2022-02-08 VITALS
HEART RATE: 76 BPM | DIASTOLIC BLOOD PRESSURE: 82 MMHG | BODY MASS INDEX: 34.86 KG/M2 | SYSTOLIC BLOOD PRESSURE: 139 MMHG | WEIGHT: 230 LBS | RESPIRATION RATE: 14 BRPM | TEMPERATURE: 97.9 F | OXYGEN SATURATION: 96 % | HEIGHT: 68 IN

## 2022-02-08 DIAGNOSIS — E11.9 TYPE 2 DIABETES MELLITUS WITHOUT COMPLICATION, WITHOUT LONG-TERM CURRENT USE OF INSULIN (HCC): Primary | ICD-10-CM

## 2022-02-08 DIAGNOSIS — R79.89 ELEVATED LIVER FUNCTION TESTS: ICD-10-CM

## 2022-02-08 DIAGNOSIS — Z87.891 HISTORY OF TOBACCO ABUSE: ICD-10-CM

## 2022-02-08 DIAGNOSIS — I10 ESSENTIAL HYPERTENSION: ICD-10-CM

## 2022-02-08 DIAGNOSIS — E66.01 SEVERE OBESITY (BMI 35.0-39.9) WITH COMORBIDITY (HCC): ICD-10-CM

## 2022-02-08 PROCEDURE — 99214 OFFICE O/P EST MOD 30 MIN: CPT | Performed by: FAMILY MEDICINE

## 2022-02-08 NOTE — PROGRESS NOTES
Joey Harkins. is a 77 y.o. male who presents to the office today with the following:  Chief Complaint   Patient presents with    Diabetes     follow up       HPI  DM  Last HbA1C 8.3  Here for recheck but too early  BS not checked    Last liver test elevated    Does not want Colon Ca screen or immunizations    Hx of Tob use 40 PYH  Quit in 2009  Would like CT scan done    Review of Systems   Constitutional: Positive for weight loss (chandged diet some). Eyes: Positive for blurred vision. Respiratory: Positive for cough (little in am) and sputum production (occasionally a little). Negative for shortness of breath. Cardiovascular: Negative for chest pain. Genitourinary: Positive for frequency. Neurological: Positive for headaches. Negative for dizziness. Endo/Heme/Allergies: Positive for polydipsia. See HPI.     Past Medical History:   Diagnosis Date    Adverse effect of anesthesia     slow to wake after lipoma removal 1/2020    Arthritis     BPH (benign prostatic hyperplasia) 1/5/2015    Diabetes (HCC)     Type II    Enlarged prostate     Fatty liver     GERD (gastroesophageal reflux disease)     Hiatal hernia     Hypertension     Ill-defined condition 2017    lt shoulder lipoma    Lipoma     Lipoma of back 11/2018    left shoulderblade area    Rotator cuff tear        Past Surgical History:   Procedure Laterality Date    HX CHOLECYSTECTOMY  2001    HX CYST REMOVAL  10/10/2017    HX HERNIA REPAIR  2001    HX MOHS PROCEDURES      HX ORTHOPAEDIC  2015     lt  shoulder replacement    HX PREMALIG/BENIGN SKIN LESION EXCISION  10/10/2017    Excision left shoulder cyst- Alicia Dueñas MD    HX ROTATOR CUFF REPAIR      rt shoulder    UPPER GI ENDOSCOPY,BIOPSY  7/6/2021         UPPER GI ENDOSCOPY,DILATN W GUIDE  7/6/2021            Allergies   Allergen Reactions    Lisinopril Cough    Pcn [Penicillins] Hives    Tramadol Nausea and Vomiting     Other reaction(s): Nausea and Vomiting    Wellbutrin [Bupropion] Other (comments)     Double vision       Current Outpatient Medications   Medication Sig    atorvastatin (LIPITOR) 40 mg tablet Take 1 Tablet by mouth daily.  diclofenac EC (VOLTAREN) 75 mg EC tablet TAKE 1 TABLET BY MOUTH DAILY IF NEEDED FOR ARTHRITIS    famotidine (PEPCID) 40 mg tablet TAKE 1 TABLET BY MOUTH EVERY DAY    hydroCHLOROthiazide (HYDRODIURIL) 25 mg tablet Take 1 Tablet by mouth daily.  losartan (COZAAR) 25 mg tablet TAKE 2 TABLETS BY MOUTH EVERY DAY (THIS REPLACES LISINOPRIL)    metFORMIN (GLUCOPHAGE) 1,000 mg tablet Take 1 Tablet by mouth two (2) times daily (with meals).  cetirizine (ZYRTEC) 10 mg tablet Take  by mouth.  loratadine (CLARITIN) 10 mg tablet Take 10 mg by mouth daily as needed for Allergies.  finasteride (PROSCAR) 5 mg tablet Take 5 mg by mouth daily.  doxazosin (CARDURA) 4 mg tablet Take 4 mg by mouth daily. No current facility-administered medications for this visit.        Social History     Socioeconomic History    Marital status:    Tobacco Use    Smoking status: Former Smoker     Packs/day: 1.00     Years: 40.00     Pack years: 40.00     Quit date: 10/5/2009     Years since quittin.3    Smokeless tobacco: Never Used   Vaping Use    Vaping Use: Never used   Substance and Sexual Activity    Alcohol use: No     Alcohol/week: 0.0 standard drinks    Drug use: No    Sexual activity: Yes     Partners: Female       Family History   Problem Relation Age of Onset    Cancer Mother         bone & gland    Heart Disease Father     Cancer Sister         breast & ovarian    Heart Disease Sister     Diabetes Brother     Coronary Art Dis Brother     Cancer Niece         bone & brain -  age 43         Physical Exam:  Visit Vitals  /82 (BP 1 Location: Left upper arm)   Pulse 76   Temp 97.9 °F (36.6 °C)   Resp 14   Ht 5' 8\" (1.727 m)   Wt 230 lb (104.3 kg)   SpO2 96%   BMI 34.97 kg/m²     Physical Exam  Vitals and nursing note reviewed. Constitutional:       Appearance: He is obese. HENT:      Head: Normocephalic and atraumatic. Right Ear: Tympanic membrane, ear canal and external ear normal.      Left Ear: Tympanic membrane, ear canal and external ear normal.   Eyes:      Extraocular Movements: Extraocular movements intact. Conjunctiva/sclera: Conjunctivae normal.   Cardiovascular:      Rate and Rhythm: Normal rate and regular rhythm. Heart sounds: Normal heart sounds. Comments: Occasional skipped beat  Pulmonary:      Effort: Pulmonary effort is normal.      Breath sounds: Normal breath sounds. Musculoskeletal:      Right lower leg: No edema. Left lower leg: No edema. Lymphadenopathy:      Cervical: No cervical adenopathy. Neurological:      Mental Status: He is alert. Psychiatric:         Mood and Affect: Mood normal.         Assessment/Plan:    ICD-10-CM ICD-9-CM    1. Type 2 diabetes mellitus without complication, without long-term current use of insulin (HCC)  E11.9 250.00 HEMOGLOBIN A1C WITH EAG   2. Essential hypertension  I92 180.9 METABOLIC PANEL, COMPREHENSIVE   3. Elevated liver function tests  Y56.31 900.5 METABOLIC PANEL, COMPREHENSIVE   4. Severe obesity (BMI 35.0-39. 9) with comorbidity ([address]  E66.01 278.01 Lost some weight   5.  History of tobacco abuse  Z87.891 V15.82 CT LOW DOSE LUNG CANCER SCREENING           Regulo Garcia MD

## 2022-02-08 NOTE — PROGRESS NOTES
1. Have you been to the ER, urgent care clinic since your last visit? Hospitalized since your last visit? No    2. Have you seen or consulted any other health care providers outside of the [address] [place] [address] since your last visit? Include any pap smears or colon screening.  No

## 2022-02-09 LAB
ALBUMIN SERPL-MCNC: 3.9 G/DL (ref 3.5–5)
ALBUMIN/GLOB SERPL: 1.4 {RATIO} (ref 1.1–2.2)
ALP SERPL-CCNC: 103 U/L (ref 45–117)
ALT SERPL-CCNC: 47 U/L (ref 12–78)
ANION GAP SERPL CALC-SCNC: 7 MMOL/L (ref 5–15)
AST SERPL-CCNC: 27 U/L (ref 15–37)
BILIRUB SERPL-MCNC: 0.6 MG/DL (ref 0.2–1)
BUN SERPL-MCNC: 24 MG/DL (ref 6–20)
BUN/CREAT SERPL: 21 (ref 12–20)
CALCIUM SERPL-MCNC: 9.3 MG/DL (ref 8.5–10.1)
CHLORIDE SERPL-SCNC: 104 MMOL/L (ref 97–108)
CO2 SERPL-SCNC: 28 MMOL/L (ref 21–32)
CREAT SERPL-MCNC: 1.13 MG/DL (ref 0.7–1.3)
EST. AVERAGE GLUCOSE BLD GHB EST-MCNC: 123 MG/DL
GLOBULIN SER CALC-MCNC: 2.8 G/DL (ref 2–4)
GLUCOSE SERPL-MCNC: 109 MG/DL (ref 65–100)
HBA1C MFR BLD: 5.9 % (ref 4–5.6)
POTASSIUM SERPL-SCNC: 3.6 MMOL/L (ref 3.5–5.1)
PROT SERPL-MCNC: 6.7 G/DL (ref 6.4–8.2)
SODIUM SERPL-SCNC: 139 MMOL/L (ref 136–145)

## 2022-02-09 NOTE — PROGRESS NOTES
Call pt, the HbA1C is 5.9, much better, very good  The electrolytes, liver and kidney tests are good  Cont current meds and diet and recheck in 6 mo

## 2022-02-22 ENCOUNTER — HOSPITAL ENCOUNTER (OUTPATIENT)
Dept: CT IMAGING | Age: 77
Discharge: HOME | End: 2022-02-22
Attending: FAMILY MEDICINE
Payer: MEDICARE

## 2022-02-22 DIAGNOSIS — Z87.891 HISTORY OF TOBACCO ABUSE: ICD-10-CM

## 2022-02-22 PROCEDURE — 71271 CT THORAX LUNG CANCER SCR C-: CPT

## 2022-03-18 PROBLEM — K21.9 GASTROESOPHAGEAL REFLUX DISEASE WITHOUT ESOPHAGITIS: Status: ACTIVE | Noted: 2017-10-17

## 2022-03-18 PROBLEM — R07.89 ANTERIOR CHEST WALL PAIN: Status: ACTIVE | Noted: 2017-07-25

## 2022-03-18 PROBLEM — R03.0 ELEVATED BLOOD PRESSURE READING WITHOUT DIAGNOSIS OF HYPERTENSION: Status: ACTIVE | Noted: 2017-07-25

## 2022-03-18 PROBLEM — R06.09 DYSPNEA ON EXERTION: Status: ACTIVE | Noted: 2017-07-25

## 2022-03-19 PROBLEM — I10 ESSENTIAL HYPERTENSION: Status: ACTIVE | Noted: 2017-09-13

## 2022-03-19 PROBLEM — E66.01 SEVERE OBESITY (HCC): Status: ACTIVE | Noted: 2018-11-19

## 2022-03-19 PROBLEM — E78.5 HYPERLIPIDEMIA: Status: ACTIVE | Noted: 2017-09-13

## 2022-03-19 PROBLEM — D17.1 LIPOMA OF BACK: Status: ACTIVE | Noted: 2017-09-13

## 2022-06-09 ENCOUNTER — HOSPITAL ENCOUNTER (OUTPATIENT)
Dept: GENERAL RADIOLOGY | Age: 77
Discharge: HOME | End: 2022-06-09
Payer: MEDICARE

## 2022-06-09 ENCOUNTER — OFFICE VISIT (OUTPATIENT)
Dept: FAMILY MEDICINE CLINIC | Age: 77
End: 2022-06-09
Payer: MEDICARE

## 2022-06-09 ENCOUNTER — TELEPHONE (OUTPATIENT)
Dept: FAMILY MEDICINE CLINIC | Age: 77
End: 2022-06-09

## 2022-06-09 VITALS
BODY MASS INDEX: 34.56 KG/M2 | WEIGHT: 228 LBS | RESPIRATION RATE: 14 BRPM | HEART RATE: 72 BPM | HEIGHT: 68 IN | OXYGEN SATURATION: 92 % | SYSTOLIC BLOOD PRESSURE: 122 MMHG | DIASTOLIC BLOOD PRESSURE: 74 MMHG | TEMPERATURE: 98 F

## 2022-06-09 DIAGNOSIS — I10 ESSENTIAL HYPERTENSION: ICD-10-CM

## 2022-06-09 DIAGNOSIS — E78.5 HYPERLIPIDEMIA, UNSPECIFIED HYPERLIPIDEMIA TYPE: ICD-10-CM

## 2022-06-09 DIAGNOSIS — E11.9 TYPE 2 DIABETES MELLITUS WITHOUT COMPLICATION, WITHOUT LONG-TERM CURRENT USE OF INSULIN (HCC): ICD-10-CM

## 2022-06-09 DIAGNOSIS — K21.9 GASTROESOPHAGEAL REFLUX DISEASE WITHOUT ESOPHAGITIS: ICD-10-CM

## 2022-06-09 DIAGNOSIS — R05.9 COUGH: ICD-10-CM

## 2022-06-09 DIAGNOSIS — M15.9 GENERALIZED OSTEOARTHRITIS OF MULTIPLE SITES: ICD-10-CM

## 2022-06-09 DIAGNOSIS — R05.9 COUGH: Primary | ICD-10-CM

## 2022-06-09 PROCEDURE — 71046 X-RAY EXAM CHEST 2 VIEWS: CPT

## 2022-06-09 PROCEDURE — 99214 OFFICE O/P EST MOD 30 MIN: CPT | Performed by: FAMILY MEDICINE

## 2022-06-09 RX ORDER — FAMOTIDINE 40 MG/1
TABLET, FILM COATED ORAL
Qty: 90 TABLET | Refills: 0 | Status: SHIPPED | OUTPATIENT
Start: 2022-06-09 | End: 2022-09-23

## 2022-06-09 RX ORDER — PREDNISONE 20 MG/1
20 TABLET ORAL
Qty: 8 TABLET | Refills: 0 | Status: SHIPPED | OUTPATIENT
Start: 2022-06-09 | End: 2022-09-22

## 2022-06-09 RX ORDER — AZITHROMYCIN 250 MG/1
TABLET, FILM COATED ORAL
Qty: 6 TABLET | Refills: 0 | Status: SHIPPED | OUTPATIENT
Start: 2022-06-09 | End: 2022-06-14

## 2022-06-09 RX ORDER — HYDROCHLOROTHIAZIDE 25 MG/1
25 TABLET ORAL DAILY
Qty: 90 TABLET | Refills: 0 | Status: SHIPPED | OUTPATIENT
Start: 2022-06-09 | End: 2022-09-22 | Stop reason: SDUPTHER

## 2022-06-09 RX ORDER — ATORVASTATIN CALCIUM 40 MG/1
40 TABLET, FILM COATED ORAL DAILY
Qty: 90 TABLET | Refills: 0 | Status: SHIPPED | OUTPATIENT
Start: 2022-06-09 | End: 2022-09-14

## 2022-06-09 RX ORDER — GUAIFENESIN AND DEXTROMETHORPHAN HYDROBROMIDE 1200; 60 MG/1; MG/1
600 TABLET, EXTENDED RELEASE ORAL 2 TIMES DAILY
Qty: 30 TABLET | Refills: 0 | Status: SHIPPED | OUTPATIENT
Start: 2022-06-09 | End: 2022-09-22

## 2022-06-09 RX ORDER — DICLOFENAC SODIUM 75 MG/1
TABLET, DELAYED RELEASE ORAL
Qty: 90 TABLET | Refills: 0 | Status: SHIPPED | OUTPATIENT
Start: 2022-06-09 | End: 2022-09-22 | Stop reason: SDUPTHER

## 2022-06-09 RX ORDER — LEVOFLOXACIN 500 MG/1
500 TABLET, FILM COATED ORAL AS NEEDED
COMMUNITY
Start: 2022-03-04 | End: 2022-09-22

## 2022-06-09 RX ORDER — LOSARTAN POTASSIUM 25 MG/1
TABLET ORAL
Qty: 180 TABLET | Refills: 0 | Status: SHIPPED | OUTPATIENT
Start: 2022-06-09 | End: 2022-09-13

## 2022-06-09 NOTE — PROGRESS NOTES
1. \"Have you been to the ER, urgent care clinic since your last visit? Hospitalized since your last visit? \" No    2. \"Have you seen or consulted any other health care providers outside of the [address] [place] [address] since your last visit? \" No     3. For patients aged 39-70: Has the patient had a colonoscopy / FIT/ Cologuard? NA - based on age      If the patient is female:    4. For patients aged 41-77: Has the patient had a mammogram within the past 2 years? NA - based on age or sex      11. For patients aged 21-65: Has the patient had a pap smear?  NA - based on age or sex

## 2022-06-09 NOTE — PROGRESS NOTES
Ruslan Last. is a 69 y.o. male who presents to the office today with the following:  Chief Complaint   Patient presents with    Diabetes     follow up    Hypertension    Cough       HPI  HTN    DM  Last HbA1C 5.9  No BS checks    Hyperlipidemia  Last Chol good except the Trig were high  Not fasting today    Cough  Eyes watery  Taking zyrtec  Worse driving has not had eye check  Eye was red, blood vessel popped  Smoked in the past    Taking Levaquin for Prostate given by Urologist    Review of Systems   Constitutional: Negative for fever. HENT: Negative for congestion. Eyes: Positive for discharge and redness. Respiratory: Positive for cough and sputum production (yellow mucus). Negative for shortness of breath and wheezing. Cardiovascular: Negative for chest pain. See HPI.     Past Medical History:   Diagnosis Date    Adverse effect of anesthesia     slow to wake after lipoma removal 1/2020    Arthritis     BPH (benign prostatic hyperplasia) 1/5/2015    Diabetes (HCC)     Type II    Enlarged prostate     Fatty liver     GERD (gastroesophageal reflux disease)     Hiatal hernia     Hypertension     Ill-defined condition 2017    lt shoulder lipoma    Lipoma     Lipoma of back 11/2018    left shoulderblade area    Rotator cuff tear        Past Surgical History:   Procedure Laterality Date    HX CHOLECYSTECTOMY  2001    HX CYST REMOVAL  10/10/2017    HX HERNIA REPAIR  2001    HX MOHS PROCEDURES      HX ORTHOPAEDIC  2015     lt  shoulder replacement    HX PREMALIG/BENIGN SKIN LESION EXCISION  10/10/2017    Excision left shoulder cyst- Jorgito Chen MD    HX ROTATOR CUFF REPAIR      rt shoulder    UPPER GI ENDOSCOPY,BIOPSY  7/6/2021         UPPER GI ENDOSCOPY,DILATN W GUIDE  7/6/2021            Allergies   Allergen Reactions    Lisinopril Cough    Pcn [Penicillins] Hives    Tramadol Nausea and Vomiting     Other reaction(s): Nausea and Vomiting    Wellbutrin [Bupropion] Other (comments)     Double vision       Current Outpatient Medications   Medication Sig    levoFLOXacin (LEVAQUIN) 500 mg tablet Take 500 mg by mouth as needed.  atorvastatin (LIPITOR) 40 mg tablet Take 1 Tablet by mouth daily.  diclofenac EC (VOLTAREN) 75 mg EC tablet TAKE 1 TABLET BY MOUTH DAILY IF NEEDED FOR ARTHRITIS    famotidine (PEPCID) 40 mg tablet TAKE 1 TABLET BY MOUTH EVERY DAY    hydroCHLOROthiazide (HYDRODIURIL) 25 mg tablet Take 1 Tablet by mouth daily.  losartan (COZAAR) 25 mg tablet TAKE 2 TABLETS BY MOUTH EVERY DAY (THIS REPLACES LISINOPRIL)    dextromethorphan-guaiFENesin (Mucinex DM) 60-1,200 mg Tb12 Take 600 mg by mouth two (2) times a day.  azithromycin (ZITHROMAX) 250 mg tablet Take 2 tablets today, then take 1 tablet daily    predniSONE (DELTASONE) 20 mg tablet Take 1 Tablet by mouth daily (with breakfast).  metFORMIN (GLUCOPHAGE) 1,000 mg tablet TAKE 1 TABLET BY MOUTH TWO (2) TIMES DAILY (WITH MEALS).  cetirizine (ZYRTEC) 10 mg tablet Take  by mouth.  loratadine (CLARITIN) 10 mg tablet Take 10 mg by mouth daily as needed for Allergies.  finasteride (PROSCAR) 5 mg tablet Take 5 mg by mouth daily.  doxazosin (CARDURA) 4 mg tablet Take 4 mg by mouth daily. No current facility-administered medications for this visit.        Social History     Socioeconomic History    Marital status:    Tobacco Use    Smoking status: Former Smoker     Packs/day: 1.00     Years: 40.00     Pack years: 40.00     Quit date: 10/5/2009     Years since quittin.6    Smokeless tobacco: Never Used   Vaping Use    Vaping Use: Never used   Substance and Sexual Activity    Alcohol use: No     Alcohol/week: 0.0 standard drinks    Drug use: No    Sexual activity: Yes     Partners: Female       Family History   Problem Relation Age of Onset    Cancer Mother         bone & gland    Heart Disease Father     Cancer Sister         breast & ovarian    Heart Disease Sister    [facility] Diabetes Brother     Coronary Art Dis Brother     Cancer Niece         bone & brain -  age 43         Physical Exam:  Visit Vitals  /74   Pulse 72   Temp 98 °F (36.7 °C)   Resp 14   Ht 5' 8\" (1.727 m)   Wt 228 lb (103.4 kg)   SpO2 92%   BMI 34.67 kg/m²     Physical Exam  Vitals and nursing note reviewed. Constitutional:       Appearance: He is obese. HENT:      Head: Normocephalic and atraumatic. Right Ear: Tympanic membrane, ear canal and external ear normal.      Left Ear: Tympanic membrane, ear canal and external ear normal.   Eyes:      General:         Right eye: No discharge. Left eye: No discharge. Extraocular Movements: Extraocular movements intact. Comments: Left eye mildly injected   Cardiovascular:      Rate and Rhythm: Normal rate and regular rhythm. Heart sounds: Normal heart sounds. Pulmonary:      Effort: Pulmonary effort is normal. No respiratory distress. Breath sounds: Normal breath sounds. No wheezing, rhonchi or rales. Lymphadenopathy:      Cervical: No cervical adenopathy. Neurological:      Mental Status: He is alert and oriented to person, place, and time. Psychiatric:         Mood and Affect: Mood normal.         Behavior: Behavior normal.         Assessment/Plan:    ICD-10-CM ICD-9-CM    1. Cough  R05.9 786.2 XR CHEST PA LAT      CBC WITH AUTOMATED DIFF      dextromethorphan-guaiFENesin (Mucinex DM) 60-1,200 mg Tb12      azithromycin (ZITHROMAX) 250 mg tablet      predniSONE (DELTASONE) 20 mg tablet   2. Hyperlipidemia, unspecified hyperlipidemia type  E78.5 272.4 atorvastatin (LIPITOR) 40 mg tablet   3. Generalized osteoarthritis of multiple sites  M15.9 715.09 diclofenac EC (VOLTAREN) 75 mg EC tablet   4. Gastroesophageal reflux disease without esophagitis  K21.9 530.81 famotidine (PEPCID) 40 mg tablet   5. Essential hypertension  I10 401.9 hydroCHLOROthiazide (HYDRODIURIL) 25 mg tablet      losartan (COZAAR) 25 mg tablet   6. Type 2 diabetes mellitus without complication, without long-term current use of insulin (HCC)  E11.9 250.00        Recheck BW in 3 o    Follow-up and Dispositions    · Return in about 3 months (around 9/9/2022), or if symptoms worsen or fail to improve.          Mansoor Barnes MD

## 2022-06-10 LAB
BASOPHILS # BLD: 0.1 K/UL (ref 0–0.1)
BASOPHILS NFR BLD: 1 % (ref 0–1)
DIFFERENTIAL METHOD BLD: ABNORMAL
EOSINOPHIL # BLD: 0.3 K/UL (ref 0–0.4)
EOSINOPHIL NFR BLD: 3 % (ref 0–7)
ERYTHROCYTE [DISTWIDTH] IN BLOOD BY AUTOMATED COUNT: 14.6 % (ref 11.5–14.5)
HCT VFR BLD AUTO: 39.3 % (ref 36.6–50.3)
HGB BLD-MCNC: 12.5 G/DL (ref 12.1–17)
IMM GRANULOCYTES # BLD AUTO: 0 K/UL (ref 0–0.04)
IMM GRANULOCYTES NFR BLD AUTO: 0 % (ref 0–0.5)
LYMPHOCYTES # BLD: 2.1 K/UL (ref 0.8–3.5)
LYMPHOCYTES NFR BLD: 22 % (ref 12–49)
MCH RBC QN AUTO: 28.3 PG (ref 26–34)
MCHC RBC AUTO-ENTMCNC: 31.8 G/DL (ref 30–36.5)
MCV RBC AUTO: 89.1 FL (ref 80–99)
MONOCYTES # BLD: 0.5 K/UL (ref 0–1)
MONOCYTES NFR BLD: 5 % (ref 5–13)
NEUTS SEG # BLD: 6.5 K/UL (ref 1.8–8)
NEUTS SEG NFR BLD: 69 % (ref 32–75)
NRBC # BLD: 0 K/UL (ref 0–0.01)
NRBC BLD-RTO: 0 PER 100 WBC
PLATELET # BLD AUTO: 273 K/UL (ref 150–400)
PMV BLD AUTO: 11.4 FL (ref 8.9–12.9)
RBC # BLD AUTO: 4.41 M/UL (ref 4.1–5.7)
WBC # BLD AUTO: 9.4 K/UL (ref 4.1–11.1)

## 2022-06-10 NOTE — PROGRESS NOTES
I have called and talked to this patient. I have verified the patient's name and . I have discussed the lab results and recommendations from Dr Shankar Gaspar. Patient verbalizes understanding at this time.

## 2022-06-10 NOTE — TELEPHONE ENCOUNTER
I have called and talked to this patient. I have verified the patient's name and . I have discussed the CXR results and recommendations from Dr Erna Mendoza. Patient verbalizes understanding at this time.

## 2022-08-24 DIAGNOSIS — E11.9 TYPE 2 DIABETES MELLITUS WITHOUT COMPLICATION, WITHOUT LONG-TERM CURRENT USE OF INSULIN (HCC): ICD-10-CM

## 2022-08-24 RX ORDER — METFORMIN HYDROCHLORIDE 1000 MG/1
TABLET ORAL
Qty: 180 TABLET | Refills: 0 | Status: SHIPPED | OUTPATIENT
Start: 2022-08-24

## 2022-08-29 ENCOUNTER — OFFICE VISIT (OUTPATIENT)
Dept: SURGERY | Age: 77
End: 2022-08-29
Payer: MEDICARE

## 2022-08-29 VITALS
BODY MASS INDEX: 34.74 KG/M2 | RESPIRATION RATE: 20 BRPM | TEMPERATURE: 97.3 F | HEART RATE: 85 BPM | WEIGHT: 229.2 LBS | OXYGEN SATURATION: 93 % | DIASTOLIC BLOOD PRESSURE: 75 MMHG | SYSTOLIC BLOOD PRESSURE: 138 MMHG | HEIGHT: 68 IN

## 2022-08-29 DIAGNOSIS — M54.6 ACUTE LEFT-SIDED THORACIC BACK PAIN: Primary | ICD-10-CM

## 2022-08-29 PROCEDURE — G8752 SYS BP LESS 140: HCPCS | Performed by: SURGERY

## 2022-08-29 PROCEDURE — 99213 OFFICE O/P EST LOW 20 MIN: CPT | Performed by: SURGERY

## 2022-08-29 PROCEDURE — 1123F ACP DISCUSS/DSCN MKR DOCD: CPT | Performed by: SURGERY

## 2022-08-29 PROCEDURE — G8427 DOCREV CUR MEDS BY ELIG CLIN: HCPCS | Performed by: SURGERY

## 2022-08-29 PROCEDURE — G8432 DEP SCR NOT DOC, RNG: HCPCS | Performed by: SURGERY

## 2022-08-29 PROCEDURE — G8754 DIAS BP LESS 90: HCPCS | Performed by: SURGERY

## 2022-08-29 PROCEDURE — G8536 NO DOC ELDER MAL SCRN: HCPCS | Performed by: SURGERY

## 2022-08-29 PROCEDURE — 1101F PT FALLS ASSESS-DOCD LE1/YR: CPT | Performed by: SURGERY

## 2022-08-29 PROCEDURE — G8417 CALC BMI ABV UP PARAM F/U: HCPCS | Performed by: SURGERY

## 2022-08-29 NOTE — PROGRESS NOTES
Identified pt with two pt identifiers(name and ). Reviewed record in preparation for visit and have obtained necessary documentation. All patient medications has been reviewed. Chief Complaint   Patient presents with    Skin Problem     Seen at the request of Self eval upper back lipoma, last seen 2020 excision of left upper back mass        Health Maintenance Due   Topic    Eye Exam Retinal or Dilated     Shingrix Vaccine Age 50> (1 of 2)    COVID-19 Vaccine (3 - Booster for Moderna series)       Vitals:    22 1415   BP: 138/75   Pulse: 85   Resp: 20   Temp: 97.3 °F (36.3 °C)   TempSrc: Temporal   SpO2: 93%   Weight: 104 kg (229 lb 3.2 oz)   Height: 5' 8\" (1.727 m)   PainSc:   6   PainLoc: Back       4. Have you been to the ER, urgent care clinic since your last visit? Hospitalized since your last visit? No    5. Have you seen or consulted any other health care providers outside of the [address] [place] [address] since your last visit? Include any pap smears or colon screening. No      Patient is accompanied by self I have received verbal consent from Neli Cross to discuss any/all medical information while they are present in the room.

## 2022-09-09 DIAGNOSIS — M79.89 PARASPINAL SOFT TISSUE MASS: Primary | ICD-10-CM

## 2022-09-09 DIAGNOSIS — R22.2 MASS OF SUBCUTANEOUS TISSUE OF BACK: ICD-10-CM

## 2022-09-13 DIAGNOSIS — I10 ESSENTIAL HYPERTENSION: ICD-10-CM

## 2022-09-13 RX ORDER — LOSARTAN POTASSIUM 25 MG/1
TABLET ORAL
Qty: 60 TABLET | Refills: 0 | Status: SHIPPED | OUTPATIENT
Start: 2022-09-13 | End: 2022-09-23

## 2022-09-14 DIAGNOSIS — E78.5 HYPERLIPIDEMIA, UNSPECIFIED HYPERLIPIDEMIA TYPE: ICD-10-CM

## 2022-09-14 RX ORDER — ATORVASTATIN CALCIUM 40 MG/1
TABLET, FILM COATED ORAL
Qty: 30 TABLET | Refills: 0 | Status: SHIPPED | OUTPATIENT
Start: 2022-09-14 | End: 2022-09-23

## 2022-09-16 ENCOUNTER — TELEPHONE (OUTPATIENT)
Dept: SURGERY | Age: 77
End: 2022-09-16

## 2022-09-16 NOTE — TELEPHONE ENCOUNTER
Called to find out why Mr. Lagos had not yet had his CAT scan. They did not realize that they were supposed to call. I gave them the phone number to central scheduling. I told him to be sure that wherever he has not done that he needs to lie prone. I told him to call us if they had any trouble getting that set up.

## 2022-09-19 ENCOUNTER — HOSPITAL ENCOUNTER (OUTPATIENT)
Dept: CT IMAGING | Age: 77
Discharge: HOME | End: 2022-09-19
Attending: SURGERY
Payer: MEDICARE

## 2022-09-19 DIAGNOSIS — R22.2 MASS OF SUBCUTANEOUS TISSUE OF BACK: ICD-10-CM

## 2022-09-19 LAB
BUN SERPL-MCNC: 23 MG/DL (ref 6–20)
CREAT SERPL-MCNC: 1.16 MG/DL (ref 0.7–1.3)

## 2022-09-19 PROCEDURE — 84520 ASSAY OF UREA NITROGEN: CPT

## 2022-09-19 PROCEDURE — 71260 CT THORAX DX C+: CPT

## 2022-09-19 PROCEDURE — 82565 ASSAY OF CREATININE: CPT

## 2022-09-19 PROCEDURE — 36415 COLL VENOUS BLD VENIPUNCTURE: CPT

## 2022-09-19 PROCEDURE — 74011000636 HC RX REV CODE- 636: Performed by: SURGERY

## 2022-09-19 RX ADMIN — IOPAMIDOL 100 ML: 612 INJECTION, SOLUTION INTRAVENOUS at 09:58

## 2022-09-22 ENCOUNTER — OFFICE VISIT (OUTPATIENT)
Dept: FAMILY MEDICINE CLINIC | Age: 77
End: 2022-09-22
Payer: MEDICARE

## 2022-09-22 VITALS
TEMPERATURE: 98 F | RESPIRATION RATE: 12 BRPM | BODY MASS INDEX: 35.79 KG/M2 | DIASTOLIC BLOOD PRESSURE: 69 MMHG | HEIGHT: 67 IN | OXYGEN SATURATION: 93 % | WEIGHT: 228 LBS | HEART RATE: 83 BPM | SYSTOLIC BLOOD PRESSURE: 127 MMHG

## 2022-09-22 DIAGNOSIS — E78.5 HYPERLIPIDEMIA, UNSPECIFIED HYPERLIPIDEMIA TYPE: ICD-10-CM

## 2022-09-22 DIAGNOSIS — I10 ESSENTIAL HYPERTENSION: ICD-10-CM

## 2022-09-22 DIAGNOSIS — M15.9 GENERALIZED OSTEOARTHRITIS OF MULTIPLE SITES: ICD-10-CM

## 2022-09-22 DIAGNOSIS — L82.0 SEBORRHEIC KERATOSES, INFLAMED: ICD-10-CM

## 2022-09-22 DIAGNOSIS — E11.9 TYPE 2 DIABETES MELLITUS WITHOUT COMPLICATION, WITHOUT LONG-TERM CURRENT USE OF INSULIN (HCC): Primary | ICD-10-CM

## 2022-09-22 PROCEDURE — 17110 DESTRUCTION B9 LES UP TO 14: CPT | Performed by: FAMILY MEDICINE

## 2022-09-22 PROCEDURE — 99214 OFFICE O/P EST MOD 30 MIN: CPT | Performed by: FAMILY MEDICINE

## 2022-09-22 RX ORDER — DICLOFENAC SODIUM 75 MG/1
TABLET, DELAYED RELEASE ORAL
Qty: 90 TABLET | Refills: 0 | Status: SHIPPED | OUTPATIENT
Start: 2022-09-22

## 2022-09-22 RX ORDER — HYDROCHLOROTHIAZIDE 25 MG/1
25 TABLET ORAL DAILY
Qty: 90 TABLET | Refills: 0 | Status: SHIPPED | OUTPATIENT
Start: 2022-09-22

## 2022-09-22 NOTE — PROGRESS NOTES
Romeo Villa. is a 69 y.o. male who presents to the office today with the following:  Chief Complaint   Patient presents with    Hypertension       HPI  HTN  BP is good    Hyperlipidemia  Not fasting    DM  No BS checks    Needs refill of Diclofenac for OA    Will have Lipoma removed, keeps coming back  Has CT scan on Mon, prev had US    Right wrist skin lesion  For 6 mo  Wart removal not helping  Itching all the time      Review of Systems   Constitutional:  Negative for fever and weight loss. HENT:  Negative for congestion. Respiratory:  Negative for cough. Cardiovascular:  Negative for chest pain. See HPI. Past Medical History:   Diagnosis Date    Adverse effect of anesthesia     slow to wake after lipoma removal 1/2020    Arrhythmia     Arthritis     BPH (benign prostatic hyperplasia) 01/05/2015    Chronic pain     Diabetes ([facility] [address] [address]     Type II    Enlarged prostate     Fatty liver     GERD (gastroesophageal reflux disease)     Hiatal hernia     Hypercholesterolemia     Hypertension     Ill-defined condition 2017    lt shoulder lipoma    Lipoma     Lipoma of back 11/2018    left shoulderblade area    Rotator cuff tear        Past Surgical History:   Procedure Laterality Date    HX CHOLECYSTECTOMY  2001    HX CYST REMOVAL  10/10/2017    HX HERNIA REPAIR  2001    HX MOHS PROCEDURES      HX ORTHOPAEDIC  2015     lt  shoulder replacement    HX OTHER SURGICAL  01/08/2020    Excisional biopsy of recurrent intramuscular left upper back mass.     HX PREMALIG/BENIGN SKIN LESION EXCISION  10/10/2017    Excision left shoulder cyst- Zia Nash MD    HX ROTATOR CUFF REPAIR      rt shoulder    UPPER GI ENDOSCOPY,BIOPSY  07/06/2021         UPPER GI ENDOSCOPY,DILATN W GUIDE  07/06/2021            Allergies   Allergen Reactions    Lisinopril Cough    Pcn [Penicillins] Hives    Tramadol Nausea and Vomiting     Other reaction(s): Nausea and Vomiting    Wellbutrin [Bupropion] Other (comments)     Double vision Current Outpatient Medications   Medication Sig    diclofenac EC (VOLTAREN) 75 mg EC tablet TAKE 1 TABLET BY MOUTH DAILY IF NEEDED FOR ARTHRITIS    hydroCHLOROthiazide (HYDRODIURIL) 25 mg tablet Take 1 Tablet by mouth daily. atorvastatin (LIPITOR) 40 mg tablet TAKE 1 TABLET BY MOUTH EVERY DAY    losartan (COZAAR) 25 mg tablet TAKE 2 TABLETS BY MOUTH EVERY DAY (THIS REPLACES LISINOPRIL)    metFORMIN (GLUCOPHAGE) 1,000 mg tablet TAKE 1 TABLET BY MOUTH TWICE A DAY WITH MEALS    famotidine (PEPCID) 40 mg tablet TAKE 1 TABLET BY MOUTH EVERY DAY    cetirizine (ZYRTEC) 10 mg tablet Take  by mouth.    loratadine (CLARITIN) 10 mg tablet Take 10 mg by mouth daily as needed for Allergies. finasteride (PROSCAR) 5 mg tablet Take 5 mg by mouth daily. doxazosin (CARDURA) 4 mg tablet Take 4 mg by mouth daily. No current facility-administered medications for this visit. Social History     Socioeconomic History    Marital status:    Tobacco Use    Smoking status: Former     Packs/day: 1.00     Years: 40.00     Pack years: 40.00     Types: Cigarettes     Quit date: 10/5/2009     Years since quittin.9    Smokeless tobacco: Never   Vaping Use    Vaping Use: Never used   Substance and Sexual Activity    Alcohol use: No     Alcohol/week: 0.0 standard drinks    Drug use: No    Sexual activity: Yes     Partners: Female       Family History   Problem Relation Age of Onset    Cancer Mother         bone & gland    Heart Disease Father     Cancer Sister         breast & ovarian    Heart Disease Sister     Diabetes Brother     Coronary Art Dis Brother     Cancer Niece         bone & brain -  age 43         Physical Exam:  Visit Vitals  /69 (BP 1 Location: Right upper arm, BP Patient Position: Sitting, BP Cuff Size: Large adult)   Pulse 83   Temp 98 °F (36.7 °C)   Resp 12   Ht 5' 7\" (1.702 m)   Wt 228 lb (103.4 kg)   SpO2 93%   BMI 35.71 kg/m²     Physical Exam  Vitals and nursing note reviewed. Constitutional:       Appearance: He is obese. HENT:      Head: Normocephalic and atraumatic. Right Ear: Tympanic membrane, ear canal and external ear normal.      Left Ear: Tympanic membrane, ear canal and external ear normal.   Eyes:      Extraocular Movements: Extraocular movements intact. Conjunctiva/sclera: Conjunctivae normal.   Cardiovascular:      Rate and Rhythm: Normal rate and regular rhythm. Heart sounds: Normal heart sounds. Pulmonary:      Breath sounds: Normal breath sounds. Musculoskeletal:      Right lower leg: No edema. Left lower leg: No edema. Lymphadenopathy:      Cervical: No cervical adenopathy. Skin:     General: Skin is warm and dry. Comments: Right wrist with raised skin colored lesion   Neurological:      Mental Status: He is alert and oriented to person, place, and time. Psychiatric:         Mood and Affect: Mood normal.         Behavior: Behavior normal.       Liquid Nitrogen treatment of skin lesion x3  Advised to RTC if not gone in 3w    Assessment/Plan:    ICD-10-CM ICD-9-CM    1. Type 2 diabetes mellitus without complication, without long-term current use of insulin ([facility])  E11.9 250.00 HEMOGLOBIN A1C WITH EAG      2. Essential hypertension  M85 456.0 METABOLIC PANEL, COMPREHENSIVE      hydroCHLOROthiazide (HYDRODIURIL) 25 mg tablet      3. Hyperlipidemia, unspecified hyperlipidemia type  E78.5 272.4 LIPID PANEL      4. Generalized osteoarthritis of multiple sites  M15.9 715.09 diclofenac EC (VOLTAREN) 75 mg EC tablet      5.  Seborrheic keratoses, inflamed  L82.0 702.11 DESTRUC BENIGN LESION, UP TO 14 LESIONS              Gerhardt Lamy, MD

## 2022-09-22 NOTE — PROGRESS NOTES
1. \"Have you been to the ER, urgent care clinic since your last visit? Hospitalized since your last visit? \" No    2. \"Have you seen or consulted any other health care providers outside of the [address] [place] [address] since your last visit? \" No     3. For patients aged 39-70: Has the patient had a colonoscopy / FIT/ Cologuard? NA - based on age      If the patient is female:    4. For patients aged 41-77: Has the patient had a mammogram within the past 2 years? NA - based on age or sex      11. For patients aged 21-65: Has the patient had a pap smear?  NA - based on age or sex

## 2022-09-23 DIAGNOSIS — R79.89 ELEVATED LIVER FUNCTION TESTS: Primary | ICD-10-CM

## 2022-09-23 LAB
ALBUMIN SERPL-MCNC: 3.5 G/DL (ref 3.5–5)
ALBUMIN/GLOB SERPL: 1.1 {RATIO} (ref 1.1–2.2)
ALP SERPL-CCNC: 134 U/L (ref 45–117)
ALT SERPL-CCNC: 84 U/L (ref 12–78)
ANION GAP SERPL CALC-SCNC: 4 MMOL/L (ref 5–15)
AST SERPL-CCNC: 43 U/L (ref 15–37)
BILIRUB SERPL-MCNC: 0.7 MG/DL (ref 0.2–1)
BUN SERPL-MCNC: 24 MG/DL (ref 6–20)
BUN/CREAT SERPL: 23 (ref 12–20)
CALCIUM SERPL-MCNC: 8.5 MG/DL (ref 8.5–10.1)
CHLORIDE SERPL-SCNC: 104 MMOL/L (ref 97–108)
CHOLEST SERPL-MCNC: 119 MG/DL
CO2 SERPL-SCNC: 32 MMOL/L (ref 21–32)
CREAT SERPL-MCNC: 1.05 MG/DL (ref 0.7–1.3)
EST. AVERAGE GLUCOSE BLD GHB EST-MCNC: 134 MG/DL
GLOBULIN SER CALC-MCNC: 3.1 G/DL (ref 2–4)
GLUCOSE SERPL-MCNC: 94 MG/DL (ref 65–100)
HBA1C MFR BLD: 6.3 % (ref 4–5.6)
HDLC SERPL-MCNC: 29 MG/DL
HDLC SERPL: 4.1 {RATIO} (ref 0–5)
LDLC SERPL CALC-MCNC: 40.6 MG/DL (ref 0–100)
POTASSIUM SERPL-SCNC: 3.6 MMOL/L (ref 3.5–5.1)
PROT SERPL-MCNC: 6.6 G/DL (ref 6.4–8.2)
SODIUM SERPL-SCNC: 140 MMOL/L (ref 136–145)
TRIGL SERPL-MCNC: 247 MG/DL (ref ?–150)
VLDLC SERPL CALC-MCNC: 49.4 MG/DL

## 2022-09-23 NOTE — PROGRESS NOTES
I have called and talked to this patient. I have verified the patient's name and . I have discussed the lab results and recommendations from Dr Jason Bolanos. Patient verbalizes understanding at this time.

## 2022-09-23 NOTE — PROGRESS NOTES
Call pt, the electrolytes are normal  The kidney tests are ok  The liver tests are more elevated, I will set you up for an US  The Chol is good, except for the Triglycerides are up, likely d/t not being fasting  The HbA1C is 6.3, higher that last time but still good

## 2022-09-26 ENCOUNTER — TELEPHONE (OUTPATIENT)
Dept: SURGERY | Age: 77
End: 2022-09-26

## 2022-09-26 NOTE — TELEPHONE ENCOUNTER
IMPRESSION  Small indentation in the left upper back without underlying mass or fluid  collection, likely a focus of scarring. Asymmetric atrophy of the left periscapular muscles likely due to left shoulder  arthroplasty. No mass or fluid collection in the left back. Called to tell patient of result above. H/o liposarcoma excision, clean margins. No new mass noted. Still with pain that started just a few months ago. Possible scar nerve entrapment. Will bring him in for injection to see if this helps.

## 2022-09-30 ENCOUNTER — HOSPITAL ENCOUNTER (OUTPATIENT)
Dept: ULTRASOUND IMAGING | Age: 77
Discharge: HOME | End: 2022-09-30
Attending: FAMILY MEDICINE
Payer: MEDICARE

## 2022-09-30 DIAGNOSIS — R79.89 ELEVATED LIVER FUNCTION TESTS: ICD-10-CM

## 2022-09-30 PROCEDURE — 76705 ECHO EXAM OF ABDOMEN: CPT

## 2022-10-03 ENCOUNTER — OFFICE VISIT (OUTPATIENT)
Dept: SURGERY | Age: 77
End: 2022-10-03
Payer: MEDICARE

## 2022-10-03 VITALS
OXYGEN SATURATION: 92 % | HEIGHT: 67 IN | TEMPERATURE: 98.2 F | RESPIRATION RATE: 20 BRPM | SYSTOLIC BLOOD PRESSURE: 132 MMHG | WEIGHT: 228 LBS | HEART RATE: 75 BPM | BODY MASS INDEX: 35.79 KG/M2 | DIASTOLIC BLOOD PRESSURE: 62 MMHG

## 2022-10-03 DIAGNOSIS — G89.18 PAIN AT SURGICAL SITE: Primary | ICD-10-CM

## 2022-10-03 PROCEDURE — 99213 OFFICE O/P EST LOW 20 MIN: CPT | Performed by: SURGERY

## 2022-10-03 PROCEDURE — G8752 SYS BP LESS 140: HCPCS | Performed by: SURGERY

## 2022-10-03 PROCEDURE — 1123F ACP DISCUSS/DSCN MKR DOCD: CPT | Performed by: SURGERY

## 2022-10-03 PROCEDURE — G8536 NO DOC ELDER MAL SCRN: HCPCS | Performed by: SURGERY

## 2022-10-03 PROCEDURE — 1101F PT FALLS ASSESS-DOCD LE1/YR: CPT | Performed by: SURGERY

## 2022-10-03 PROCEDURE — G8432 DEP SCR NOT DOC, RNG: HCPCS | Performed by: SURGERY

## 2022-10-03 PROCEDURE — 3074F SYST BP LT 130 MM HG: CPT | Performed by: SURGERY

## 2022-10-03 PROCEDURE — 3078F DIAST BP <80 MM HG: CPT | Performed by: SURGERY

## 2022-10-03 PROCEDURE — G8417 CALC BMI ABV UP PARAM F/U: HCPCS | Performed by: SURGERY

## 2022-10-03 PROCEDURE — G8754 DIAS BP LESS 90: HCPCS | Performed by: SURGERY

## 2022-10-03 PROCEDURE — 20552 NJX 1/MLT TRIGGER POINT 1/2: CPT | Performed by: SURGERY

## 2022-10-03 PROCEDURE — G8427 DOCREV CUR MEDS BY ELIG CLIN: HCPCS | Performed by: SURGERY

## 2022-10-03 NOTE — PROGRESS NOTES
Identified pt with two pt identifiers(name and ). Reviewed record in preparation for visit and have obtained necessary documentation. All patient medications has been reviewed. Chief Complaint   Patient presents with    Follow-up     Injection in back        Health Maintenance Due   Topic    Eye Exam Retinal or Dilated     Shingrix Vaccine Age 50> (1 of 2)    COVID-19 Vaccine (3 - Booster for Moderna series)       Vitals:    10/03/22 1356   BP: 132/62   Pulse: 75   Resp: 20   Temp: 98.2 °F (36.8 °C)   SpO2: 92%   Weight: 103.4 kg (228 lb)   Height: 5' 7\" (1.702 m)   PainSc:   0 - No pain       4. Have you been to the ER, urgent care clinic since your last visit? Hospitalized since your last visit? No    5. Have you seen or consulted any other health care providers outside of the [address] [place] [address] since your last visit? Include any pap smears or colon screening. No      Patient is accompanied by self I have received verbal consent from Cisco Cisse. to discuss any/all medical information while they are present in the room.

## 2022-10-05 NOTE — PROGRESS NOTES
I have called and talked to this patient. I have verified the patient's name and . I have discussed the US results and recommendations from Dr Elo Toledo. Patient verbalizes understanding at this time.

## 2022-10-13 NOTE — PROGRESS NOTES
Encounter Date: 8/29/2022    Subjective:      Rocky Garcia is a 69 y.o. male presents for evaluation of pain along the scar in his left upper back from having had a recurrent left upper back mass removed in 2020. This has been removed by my partner previously but it reoccurred. On the second excision it was found to be an atypical lipomatous tumor/well-differentiated liposarcoma. The margins were clear. He says that he is having trouble with his rotator cuff on the left side as well and the scar seems to be tender he has not noticed any new mass. Objective:     Visit Vitals  /75 (BP 1 Location: Right upper arm, BP Patient Position: Sitting, BP Cuff Size: Adult)   Pulse 85   Temp 97.3 °F (36.3 °C) (Temporal)   Resp 20   Ht 5' 8\" (1.727 m)   Wt 104 kg (229 lb 3.2 oz)   SpO2 93%   BMI 34.85 kg/m²       General:  alert, cooperative, no distress, appears stated age   Chest: CTAB, RRR   Back: There is a 10 cm transverse scar over the upper scapula on the left. There is no mass palpable beneath this. There is a second smaller scar oriented vertically just below this. Assessment:     Possible nerve entrapment versus referred pain from his rotator cuff injury. Plan:     Given the history of liposarcoma, I will send him for a CAT scan to be sure there is no recurrence. Margins were clear and therefore this would be unlikely but it still needs to be ruled out. If there is no new mass, I will offer an injection to try to help with his symptoms. He will also follow-up with his orthopedic surgeon regarding his rotator cuff.     Jacques Prado MD

## 2022-11-12 DIAGNOSIS — E11.9 TYPE 2 DIABETES MELLITUS WITHOUT COMPLICATION, WITHOUT LONG-TERM CURRENT USE OF INSULIN (HCC): ICD-10-CM

## 2022-11-13 RX ORDER — METFORMIN HYDROCHLORIDE 1000 MG/1
TABLET ORAL
Qty: 180 TABLET | Refills: 0 | Status: SHIPPED | OUTPATIENT
Start: 2022-11-13

## 2022-11-25 NOTE — PROGRESS NOTES
Encounter Date: 10/3/2022    Subjective:      Lucho Santana is a 69 y.o. male presents for evaluation of pain along the scar in his left upper back from having had a recurrent left upper back mass removed in 2020. This has been removed by my partner previously but it reoccurred. On the second excision it was found to be an atypical lipomatous tumor/well-differentiated liposarcoma. The margins were clear. He says that he is having trouble with his rotator cuff on the left side as well and the scar seems to be tender he has not noticed any new mass. Pain continues near the old scar and in the shoulder. Objective:     Visit Vitals  /62 (BP 1 Location: Right arm, BP Patient Position: Sitting, BP Cuff Size: Adult)   Pulse 75   Temp 98.2 °F (36.8 °C)   Resp 20   Ht 5' 7\" (1.702 m)   Wt 103.4 kg (228 lb)   SpO2 92%   BMI 35.71 kg/m²       General:  alert, cooperative, no distress, appears stated age   Chest: CTAB, RRR   Back: There is a 10 cm transverse scar over the upper scapula on the left. There is no mass palpable beneath this. There is a second smaller scar oriented vertically just below this. Assessment:     Possible nerve entrapment versus referred pain from his rotator cuff injury. Plan: We identified a trigger point and injected local anesthetic and steroid there. He did have some evidence of relief. He will follow-up with his orthopedic doctor. I explained that sometimes we may need to inject more than once. I also I am optimistic that with treatment of his rotator cuff injury this may improve as well.     Nico Rose MD

## 2022-11-28 ENCOUNTER — OFFICE VISIT (OUTPATIENT)
Dept: FAMILY MEDICINE CLINIC | Age: 77
End: 2022-11-28
Payer: MEDICARE

## 2022-11-28 VITALS
OXYGEN SATURATION: 98 % | SYSTOLIC BLOOD PRESSURE: 134 MMHG | HEART RATE: 68 BPM | RESPIRATION RATE: 18 BRPM | HEIGHT: 67 IN | WEIGHT: 228 LBS | TEMPERATURE: 98 F | BODY MASS INDEX: 35.79 KG/M2 | DIASTOLIC BLOOD PRESSURE: 80 MMHG

## 2022-11-28 DIAGNOSIS — Z01.818 PRE-OP EXAM: Primary | ICD-10-CM

## 2022-11-28 DIAGNOSIS — L98.9 SKIN LESION: ICD-10-CM

## 2022-11-28 PROCEDURE — G8427 DOCREV CUR MEDS BY ELIG CLIN: HCPCS | Performed by: FAMILY MEDICINE

## 2022-11-28 PROCEDURE — G8536 NO DOC ELDER MAL SCRN: HCPCS | Performed by: FAMILY MEDICINE

## 2022-11-28 PROCEDURE — 3078F DIAST BP <80 MM HG: CPT | Performed by: FAMILY MEDICINE

## 2022-11-28 PROCEDURE — 99213 OFFICE O/P EST LOW 20 MIN: CPT | Performed by: FAMILY MEDICINE

## 2022-11-28 PROCEDURE — 1123F ACP DISCUSS/DSCN MKR DOCD: CPT | Performed by: FAMILY MEDICINE

## 2022-11-28 PROCEDURE — G8417 CALC BMI ABV UP PARAM F/U: HCPCS | Performed by: FAMILY MEDICINE

## 2022-11-28 PROCEDURE — 3074F SYST BP LT 130 MM HG: CPT | Performed by: FAMILY MEDICINE

## 2022-11-28 PROCEDURE — G8510 SCR DEP NEG, NO PLAN REQD: HCPCS | Performed by: FAMILY MEDICINE

## 2022-11-28 PROCEDURE — G8752 SYS BP LESS 140: HCPCS | Performed by: FAMILY MEDICINE

## 2022-11-28 PROCEDURE — 1101F PT FALLS ASSESS-DOCD LE1/YR: CPT | Performed by: FAMILY MEDICINE

## 2022-11-28 PROCEDURE — G8754 DIAS BP LESS 90: HCPCS | Performed by: FAMILY MEDICINE

## 2022-11-28 NOTE — PROGRESS NOTES
Cisco Torrez is a 69 y.o. male who presents to the office today with the following:  Chief Complaint   Patient presents with    Pre-op Exam     cataract       HPI  Pt is having Cataract surgery in December    No other c/o    Skin lesion right arm did not go away with Liquid Nitrogen treatment  Itching      Review of Systems   Respiratory:  Negative for shortness of breath. Cardiovascular:  Negative for chest pain. Skin:  Positive for itching. See HPI. Past Medical History:   Diagnosis Date    Adverse effect of anesthesia     slow to wake after lipoma removal 1/2020    Arrhythmia     Arthritis     BPH (benign prostatic hyperplasia) 01/05/2015    Chronic pain     Diabetes ([address]     Type II    Fatty liver     GERD (gastroesophageal reflux disease)     Hiatal hernia     Hypercholesterolemia     Hypertension     Ill-defined condition 2017    lt shoulder lipoma    Lipoma of back 11/2018    left shoulderblade area    Rotator cuff tear        Past Surgical History:   Procedure Laterality Date    HX CHOLECYSTECTOMY  2001    HX CYST REMOVAL  10/10/2017    HX HERNIA REPAIR  2001    HX MOHS PROCEDURES      HX ORTHOPAEDIC  2015     lt  shoulder replacement    HX OTHER SURGICAL  01/08/2020    Excisional biopsy of recurrent intramuscular left upper back mass.     HX PREMALIG/BENIGN SKIN LESION EXCISION  10/10/2017    Excision left shoulder cyst- Cristobal Pascual MD    HX ROTATOR CUFF REPAIR      rt shoulder    UPPER GI ENDOSCOPY,DILATN W GUIDE  07/06/2021            Allergies   Allergen Reactions    Lisinopril Cough    Pcn [Penicillins] Hives    Tramadol Nausea and Vomiting     Other reaction(s): Nausea and Vomiting    Wellbutrin [Bupropion] Other (comments)     Double vision       Current Outpatient Medications   Medication Sig    metFORMIN (GLUCOPHAGE) 1,000 mg tablet TAKE 1 TABLET BY MOUTH TWICE A DAY WITH MEALS    losartan (COZAAR) 25 mg tablet TAKE 2 TABLETS BY MOUTH EVERY DAY (THIS REPLACES LISINOPRIL) atorvastatin (LIPITOR) 40 mg tablet TAKE 1 TABLET BY MOUTH EVERY DAY    famotidine (PEPCID) 40 mg tablet TAKE 1 TABLET BY MOUTH EVERY DAY    diclofenac EC (VOLTAREN) 75 mg EC tablet TAKE 1 TABLET BY MOUTH DAILY IF NEEDED FOR ARTHRITIS    hydroCHLOROthiazide (HYDRODIURIL) 25 mg tablet Take 1 Tablet by mouth daily. cetirizine (ZYRTEC) 10 mg tablet Take  by mouth.    loratadine (CLARITIN) 10 mg tablet Take 10 mg by mouth daily as needed for Allergies. finasteride (PROSCAR) 5 mg tablet Take 5 mg by mouth daily. doxazosin (CARDURA) 4 mg tablet Take 4 mg by mouth daily. No current facility-administered medications for this visit. Social History     Socioeconomic History    Marital status:    Tobacco Use    Smoking status: Former     Packs/day: 1.00     Years: 40.00     Pack years: 40.00     Types: Cigarettes     Quit date: 10/5/2009     Years since quittin.1    Smokeless tobacco: Never   Vaping Use    Vaping Use: Never used   Substance and Sexual Activity    Alcohol use: No     Alcohol/week: 0.0 standard drinks    Drug use: No    Sexual activity: Yes     Partners: Female       Family History   Problem Relation Age of Onset    Cancer Mother         bone & gland    Heart Disease Father     Cancer Sister         breast & ovarian    Heart Disease Sister     Diabetes Brother     Coronary Art Dis Brother     Cancer Niece         bone & brain -  age 43         Physical Exam:  Visit Vitals  /80 (BP 1 Location: Left upper arm, BP Patient Position: At rest, BP Cuff Size: Adult)   Pulse 68   Temp 98 °F (36.7 °C) (Temporal)   Resp 18   Ht 5' 7\" (1.702 m)   Wt 228 lb (103.4 kg)   SpO2 98%   BMI 35.71 kg/m²     Physical Exam  Vitals reviewed. Constitutional:       Appearance: He is obese. HENT:      Head: Normocephalic and atraumatic.       Right Ear: Tympanic membrane, ear canal and external ear normal.      Left Ear: Tympanic membrane, ear canal and external ear normal.   Eyes: Extraocular Movements: Extraocular movements intact. Conjunctiva/sclera: Conjunctivae normal.   Cardiovascular:      Rate and Rhythm: Normal rate and regular rhythm. Heart sounds: Normal heart sounds. Pulmonary:      Effort: Pulmonary effort is normal.      Breath sounds: Normal breath sounds. Abdominal:      General: There is no distension. Palpations: Abdomen is soft. Tenderness: There is no abdominal tenderness. There is no right CVA tenderness, left CVA tenderness or guarding. Musculoskeletal:      Right lower leg: No edema. Left lower leg: No edema. Lymphadenopathy:      Cervical: No cervical adenopathy. Skin:     General: Skin is warm and dry. Comments: Right wrist with raised skin colored lesion sim to hyperkeratotic mole   Neurological:      Mental Status: He is alert and oriented to person, place, and time. Psychiatric:         Mood and Affect: Mood normal.         Behavior: Behavior normal.       Assessment/Plan:    ICD-10-CM ICD-9-CM    1. Pre-op exam  Z01.818 V72.84       2.  Skin lesion  L98.9 709.9 Advised to see a Dermatologist        See Pre op form copy  Hold Diclofenac 3 d prior to surgery        Corry Mac MD

## 2022-11-28 NOTE — PROGRESS NOTES
1. \"Have you been to the ER, urgent care clinic since your last visit? Hospitalized since your last visit? \" No    2. \"Have you seen or consulted any other health care providers outside of the [address] [place] [address] since your last visit? \" No     3. For patients aged 39-70: Has the patient had a colonoscopy / FIT/ Cologuard? Yes - no Care Gap present      If the patient is female:    4. For patients aged 41-77: Has the patient had a mammogram within the past 2 years? NA - based on age or sex      11. For patients aged 21-65: Has the patient had a pap smear?  NA - based on age or sex

## 2022-12-18 DIAGNOSIS — I10 ESSENTIAL HYPERTENSION: ICD-10-CM

## 2022-12-18 DIAGNOSIS — M15.9 GENERALIZED OSTEOARTHRITIS OF MULTIPLE SITES: ICD-10-CM

## 2022-12-18 RX ORDER — HYDROCHLOROTHIAZIDE 25 MG/1
TABLET ORAL
Qty: 90 TABLET | Refills: 0 | Status: SHIPPED | OUTPATIENT
Start: 2022-12-18

## 2022-12-18 RX ORDER — DICLOFENAC SODIUM 75 MG/1
TABLET, DELAYED RELEASE ORAL
Qty: 90 TABLET | Refills: 0 | Status: SHIPPED | OUTPATIENT
Start: 2022-12-18

## 2022-12-20 DIAGNOSIS — E11.9 TYPE 2 DIABETES MELLITUS WITHOUT COMPLICATION, WITHOUT LONG-TERM CURRENT USE OF INSULIN (HCC): ICD-10-CM

## 2022-12-20 RX ORDER — METFORMIN HYDROCHLORIDE 1000 MG/1
TABLET ORAL
Qty: 180 TABLET | Refills: 0 | Status: SHIPPED | OUTPATIENT
Start: 2022-12-20

## 2023-03-28 ENCOUNTER — OFFICE VISIT (OUTPATIENT)
Dept: FAMILY MEDICINE CLINIC | Age: 78
End: 2023-03-28
Payer: MEDICARE

## 2023-03-28 VITALS
WEIGHT: 235 LBS | BODY MASS INDEX: 35.61 KG/M2 | OXYGEN SATURATION: 90 % | DIASTOLIC BLOOD PRESSURE: 70 MMHG | TEMPERATURE: 98 F | SYSTOLIC BLOOD PRESSURE: 138 MMHG | HEIGHT: 68 IN | HEART RATE: 77 BPM | RESPIRATION RATE: 12 BRPM

## 2023-03-28 DIAGNOSIS — M54.41 ACUTE MIDLINE LOW BACK PAIN WITH BILATERAL SCIATICA: Primary | ICD-10-CM

## 2023-03-28 DIAGNOSIS — E78.5 HYPERLIPIDEMIA, UNSPECIFIED HYPERLIPIDEMIA TYPE: ICD-10-CM

## 2023-03-28 DIAGNOSIS — M54.42 ACUTE MIDLINE LOW BACK PAIN WITH BILATERAL SCIATICA: Primary | ICD-10-CM

## 2023-03-28 DIAGNOSIS — E11.9 TYPE 2 DIABETES MELLITUS WITHOUT COMPLICATION, WITHOUT LONG-TERM CURRENT USE OF INSULIN (HCC): ICD-10-CM

## 2023-03-28 DIAGNOSIS — R25.2 MUSCLE CRAMPS AT NIGHT: ICD-10-CM

## 2023-03-28 DIAGNOSIS — K21.9 GASTROESOPHAGEAL REFLUX DISEASE WITHOUT ESOPHAGITIS: ICD-10-CM

## 2023-03-28 DIAGNOSIS — E66.01 SEVERE OBESITY (BMI 35.0-39.9) WITH COMORBIDITY (HCC): ICD-10-CM

## 2023-03-28 DIAGNOSIS — M15.9 GENERALIZED OSTEOARTHRITIS OF MULTIPLE SITES: ICD-10-CM

## 2023-03-28 DIAGNOSIS — I10 ESSENTIAL HYPERTENSION: ICD-10-CM

## 2023-03-28 DIAGNOSIS — R79.89 ELEVATED LIVER FUNCTION TESTS: ICD-10-CM

## 2023-03-28 PROCEDURE — 1101F PT FALLS ASSESS-DOCD LE1/YR: CPT | Performed by: FAMILY MEDICINE

## 2023-03-28 PROCEDURE — 3078F DIAST BP <80 MM HG: CPT | Performed by: FAMILY MEDICINE

## 2023-03-28 PROCEDURE — G8536 NO DOC ELDER MAL SCRN: HCPCS | Performed by: FAMILY MEDICINE

## 2023-03-28 PROCEDURE — G8510 SCR DEP NEG, NO PLAN REQD: HCPCS | Performed by: FAMILY MEDICINE

## 2023-03-28 PROCEDURE — G8417 CALC BMI ABV UP PARAM F/U: HCPCS | Performed by: FAMILY MEDICINE

## 2023-03-28 PROCEDURE — 3075F SYST BP GE 130 - 139MM HG: CPT | Performed by: FAMILY MEDICINE

## 2023-03-28 PROCEDURE — 1123F ACP DISCUSS/DSCN MKR DOCD: CPT | Performed by: FAMILY MEDICINE

## 2023-03-28 PROCEDURE — G8427 DOCREV CUR MEDS BY ELIG CLIN: HCPCS | Performed by: FAMILY MEDICINE

## 2023-03-28 PROCEDURE — 99214 OFFICE O/P EST MOD 30 MIN: CPT | Performed by: FAMILY MEDICINE

## 2023-03-28 RX ORDER — ATORVASTATIN CALCIUM 40 MG/1
40 TABLET, FILM COATED ORAL DAILY
Qty: 90 TABLET | Refills: 1 | Status: SHIPPED | OUTPATIENT
Start: 2023-03-28

## 2023-03-28 RX ORDER — DEXAMETHASONE SODIUM PHOSPHATE 4 MG/ML
4 INJECTION, SOLUTION INTRA-ARTICULAR; INTRALESIONAL; INTRAMUSCULAR; INTRAVENOUS; SOFT TISSUE ONCE
Status: DISCONTINUED | OUTPATIENT
Start: 2023-03-28 | End: 2023-03-28

## 2023-03-28 RX ORDER — LOSARTAN POTASSIUM 25 MG/1
TABLET ORAL
Qty: 180 TABLET | Refills: 1 | Status: SHIPPED | OUTPATIENT
Start: 2023-03-28

## 2023-03-28 RX ORDER — HYDROCHLOROTHIAZIDE 25 MG/1
25 TABLET ORAL DAILY
Qty: 90 TABLET | Refills: 1 | Status: SHIPPED | OUTPATIENT
Start: 2023-03-28

## 2023-03-28 RX ORDER — CYCLOBENZAPRINE HCL 5 MG
5 TABLET ORAL
Qty: 45 TABLET | Refills: 0 | Status: SHIPPED | OUTPATIENT
Start: 2023-03-28

## 2023-03-28 RX ORDER — METFORMIN HYDROCHLORIDE 1000 MG/1
1000 TABLET ORAL 2 TIMES DAILY WITH MEALS
Qty: 180 TABLET | Refills: 1 | Status: SHIPPED | OUTPATIENT
Start: 2023-03-28

## 2023-03-28 RX ORDER — FAMOTIDINE 40 MG/1
40 TABLET, FILM COATED ORAL DAILY
Qty: 90 TABLET | Refills: 1 | Status: SHIPPED | OUTPATIENT
Start: 2023-03-28

## 2023-03-28 RX ORDER — DICLOFENAC SODIUM 75 MG/1
TABLET, DELAYED RELEASE ORAL
Qty: 90 TABLET | Refills: 1 | Status: SHIPPED | OUTPATIENT
Start: 2023-03-28

## 2023-03-28 RX ORDER — LEVOFLOXACIN 500 MG/1
500 TABLET, FILM COATED ORAL AS NEEDED
COMMUNITY
Start: 2023-03-07

## 2023-03-28 NOTE — PROGRESS NOTES
YES Answers must have Comments  1. \"Have you been to the ER, urgent care clinic since your last visit? Hospitalized since your last visit? \"    [] YES   [x] NO       2. Have you seen or consulted any other health care providers outside of [address] [place] [address] since your last visit?     [] YES   [x] NO       3. For patients aged 39-70: Have you had a colorectal cancer screening such as a colonoscopy/FIT/Cologuard? Nurse/CMA to request records if not in chart   [x] YES   [] NO   [] NA, based on age    If the patient is female:      4. For female patients aged 41-77: Chhaya Parson you had a mammogram in the last two years?  Nurse/CMA to request records if not in chart   [] YES   [] NO   [] NA, based on age    11. For female patients aged 21-65: Chhaya Parson you had a pap smear?   Nurse/CMA to request records if not in chart   [] YES   [] NO  [] NA, based on age

## 2023-03-28 NOTE — PROGRESS NOTES
Uri Quach is a 69 y.o. male who presents to the office today with the following:  Chief Complaint   Patient presents with    Hypertension     Follow up     Back Pain     X several weeks, severe pain       HPI  HTN  No BP checks at home      DM  No BS checks  Needs       Back pain  Severe for last 2 weeks  In low back in midline  No injury  Pain with coughing  Taking Diclofenac  Not helping  Aching and burning  radiation of the pain to buttocks  Worse lying in bed  Can not lie flat  Can not walk much  Feels better walking but SOB with that      Out of arthritis meds  But were called in 2 w ago  Taking Diclofenac once a day    Sees Urologist  Needs Cardura refilled    Elevated liver tests  No ETOH  No risk of Hepatitis  US showed fatty liver  Ever had other tests done        Review of Systems   Gastrointestinal: Negative. Genitourinary: Negative. Musculoskeletal:  Positive for joint pain and myalgias (cramps at night). Negative for falls. Neurological:  Negative for sensory change and weakness. See HPI. Past Medical History:   Diagnosis Date    Adverse effect of anesthesia     slow to wake after lipoma removal 1/2020    Arrhythmia     Arthritis     BPH (benign prostatic hyperplasia) 01/05/2015    Chronic pain     Di[address] ([facility] [address]     Type II    Fatty liver     GERD (gastroesophageal reflux disease)     Hiatal hernia     Hypercholesterolemia     Hypertension     Ill-defined condition 2017    lt shoulder lipoma    Lipoma of back 11/2018    left shoulderblade area    Rotator cuff tear        Past Surgical History:   Procedure Laterality Date    HX CHOLECYSTECTOMY  2001    HX CYST REMOVAL  10/10/2017    HX HERNIA REPAIR  2001    HX MOHS PROCEDURES      HX ORTHOPAEDIC  2015     lt  shoulder replacement    HX OTHER SURGICAL  01/08/2020    Excisional biopsy of recurrent intramuscular left upper back mass.     HX PREMALIG/BENIGN SKIN LESION EXCISION  10/10/2017    Excision left shoulder cyst- Tang Slipper, MD NUÑEZ ROTATOR CUFF REPAIR      rt shoulder    UPPER GI ENDOSCOPY,DONN W GUIDE  2021            Allergies   Allergen Reactions    Lisinopril Cough    Pcn [Penicillins] Hives    Tramadol Nausea and Vomiting     Other reaction(s): Nausea and Vomiting    Wellbutrin [Bupropion] Other (comments)     Double vision       Current Outpatient Medications   Medication Sig    levoFLOXacin (LEVAQUIN) 500 mg tablet 500 mg as needed. metFORMIN (GLUCOPHAGE) 1,000 mg tablet Take 1 Tablet by mouth two (2) times daily (with meals). losartan (COZAAR) 25 mg tablet Take bid    atorvastatin (LIPITOR) 40 mg tablet Take 1 Tablet by mouth daily. famotidine (PEPCID) 40 mg tablet Take 1 Tablet by mouth daily. hydroCHLOROthiazide (HYDRODIURIL) 25 mg tablet Take 1 Tablet by mouth daily. diclofenac EC (VOLTAREN) 75 mg EC tablet Take one a day    cyclobenzaprine (FLEXERIL) 5 mg tablet Take 1 Tablet by mouth three (3) times daily as needed for Muscle Spasm(s). cetirizine (ZYRTEC) 10 mg tablet Take  by mouth.    loratadine (CLARITIN) 10 mg tablet Take 10 mg by mouth daily as needed for Allergies. finasteride (PROSCAR) 5 mg tablet Take 5 mg by mouth daily. doxazosin (CARDURA) 4 mg tablet Take 4 mg by mouth daily.      Current Facility-Administered Medications   Medication    dexamethasone (DECADRON) 4 mg/mL injection 4 mg       Social History     Socioeconomic History    Marital status:    Tobacco Use    Smoking status: Former     Packs/day: 1.00     Years: 40.00     Pack years: 40.00     Types: Cigarettes     Quit date: 10/5/2009     Years since quittin.4    Smokeless tobacco: Never   Vaping Use    Vaping Use: Never used   Substance and Sexual Activity    Alcohol use: No     Alcohol/week: 0.0 standard drinks    Drug use: No    Sexual activity: Yes     Partners: Female     Social Determinants of Health     Financial Resource Strain: High Risk    Difficulty of Paying Living Expenses: Hard   Food Insecurity: Food Insecurity Present    Worried About Running Out of Food in the Last Year: Sometimes true    Ran Out of Food in the Last Year: Never true   Transportation Needs: No Transportation Needs    Lack of Transportation (Medical): No    Lack of Transportation (Non-Medical): No   Housing Stability: Unknown    Unable to Pay for Housing in the Last Year: No    Unstable Housing in the Last Year: No       Family History   Problem Relation Age of Onset    Cancer Mother         bone & gland    Heart Disease Father     Cancer Sister         breast & ovarian    Heart Disease Sister     Diabetes Brother     Coronary Art Dis Brother     Cancer Niece         bone & brain -  age 43         Physical Exam:  Visit Vitals  BP (!) 145/72 (BP 1 Location: Right upper arm, BP Patient Position: Sitting, BP Cuff Size: Adult)   Pulse 77   Temp 98 °F (36.7 °C)   Resp 12   Ht 5' 8\" (1.727 m)   Wt 235 lb (106.6 kg)   SpO2 90%   BMI 35.73 kg/m²     Physical Exam  Vitals and nursing note reviewed. Constitutional:       Appearance: He is obese. HENT:      Head: Normocephalic and atraumatic. Eyes:      Extraocular Movements: Extraocular movements intact. Conjunctiva/sclera: Conjunctivae normal.   Cardiovascular:      Rate and Rhythm: Normal rate and regular rhythm. Heart sounds: Normal heart sounds. Pulmonary:      Effort: Pulmonary effort is normal.      Breath sounds: Normal breath sounds. Musculoskeletal:         General: Tenderness (tender over lumbar spine and left low back paraspinal muscles) present. Right lower leg: Edema present. Left lower leg: Edema present. Comments: Trace of edema bilat, pt able to lift toes and heels   Skin:     General: Skin is warm and dry. Neurological:      Mental Status: He is alert and oriented to person, place, and time. Psychiatric:         Mood and Affect: Mood normal.         Behavior: Behavior normal.       Assessment/Plan:    ICD-10-CM ICD-9-CM    1.  Acute midline low back pain with bilateral sciatica  M54.42 724.2 XR SPINE LUMB 2 OR 3 V    M54.41 724.3 REFERRAL TO PHYSICAL THERAPY      dexamethasone (DECADRON) 4 mg/mL injection 4 mg      2. Type 2 diabetes mellitus without complication, without long-term current use of insulin (HCC)  E11.9 250.00 HEMOGLOBIN A1C WITH EAG      HM DIABETES FOOT EXAM      metFORMIN (GLUCOPHAGE) 1,000 mg tablet      HEMOGLOBIN A1C WITH EAG      3. Essential hypertension  K30 911.7 METABOLIC PANEL, COMPREHENSIVE      CBC WITH AUTOMATED DIFF      losartan (COZAAR) 25 mg tablet      hydroCHLOROthiazide (HYDRODIURIL) 25 mg tablet      CBC WITH AUTOMATED DIFF      METABOLIC PANEL, COMPREHENSIVE      4. Severe obesity (BMI 35.0-39. 9) with comorbidity ([address]  E66.01 278.01       5. Hyperlipidemia, unspecified hyperlipidemia type  E78.5 272.4 atorvastatin (LIPITOR) 40 mg tablet      6. Gastroesophageal reflux disease without esophagitis  K21.9 530.81 famotidine (PEPCID) 40 mg tablet      7. Generalized osteoarthritis of multiple sites  M15.9 715.09 diclofenac EC (VOLTAREN) 75 mg EC tablet      8. Muscle cramps at night  R25.2 729.82 cyclobenzaprine (FLEXERIL) 5 mg tablet      9.  Elevated liver function tests  R79.89 790.6 IRON      MITOCHONDRIAL M2 AB      CERULOPLASMIN      CERULOPLASMIN      MITOCHONDRIAL M2 AB      IRON      Red flag sy discussed  Try Tylenol 650 mg bid in addition to Diclofenac  Will try PT and Decadron  RTC if not better or worse    Pt left before getting Decadron shot and refused to come back per Pedro Null MD

## 2023-03-28 NOTE — PROGRESS NOTES
I have received a call from [facility], patient's daughter in law who also is their . She wants to report that the patient did not get the Decadron 4mg/ml injection that was order. I have asked that they come back and I will give it to him. The patient says he will not come back since he has already left. If he is still having issues at this next visit, he will get it then.

## 2023-03-29 ENCOUNTER — TELEPHONE (OUTPATIENT)
Dept: FAMILY MEDICINE CLINIC | Age: 78
End: 2023-03-29

## 2023-03-29 LAB
ALBUMIN SERPL-MCNC: 3.7 G/DL (ref 3.5–5)
ALBUMIN/GLOB SERPL: 1.4 (ref 1.1–2.2)
ALP SERPL-CCNC: 89 U/L (ref 45–117)
ALT SERPL-CCNC: 58 U/L (ref 12–78)
ANION GAP SERPL CALC-SCNC: 6 MMOL/L (ref 5–15)
AST SERPL-CCNC: 36 U/L (ref 15–37)
BASOPHILS # BLD: 0.1 K/UL (ref 0–0.1)
BASOPHILS NFR BLD: 1 % (ref 0–1)
BILIRUB SERPL-MCNC: 0.5 MG/DL (ref 0.2–1)
BUN SERPL-MCNC: 28 MG/DL (ref 6–20)
BUN/CREAT SERPL: 27 (ref 12–20)
CALCIUM SERPL-MCNC: 9.4 MG/DL (ref 8.5–10.1)
CHLORIDE SERPL-SCNC: 102 MMOL/L (ref 97–108)
CO2 SERPL-SCNC: 33 MMOL/L (ref 21–32)
CREAT SERPL-MCNC: 1.02 MG/DL (ref 0.7–1.3)
DIFFERENTIAL METHOD BLD: ABNORMAL
EOSINOPHIL # BLD: 0.2 K/UL (ref 0–0.4)
EOSINOPHIL NFR BLD: 3 % (ref 0–7)
ERYTHROCYTE [DISTWIDTH] IN BLOOD BY AUTOMATED COUNT: 14.7 % (ref 11.5–14.5)
EST. AVERAGE GLUCOSE BLD GHB EST-MCNC: 128 MG/DL
GLOBULIN SER CALC-MCNC: 2.7 G/DL (ref 2–4)
GLUCOSE SERPL-MCNC: 103 MG/DL (ref 65–100)
HBA1C MFR BLD: 6.1 % (ref 4–5.6)
HCT VFR BLD AUTO: 39.9 % (ref 36.6–50.3)
HGB BLD-MCNC: 12.1 G/DL (ref 12.1–17)
IMM GRANULOCYTES # BLD AUTO: 0 K/UL (ref 0–0.04)
IMM GRANULOCYTES NFR BLD AUTO: 0 % (ref 0–0.5)
IRON SERPL-MCNC: 56 UG/DL (ref 35–150)
LYMPHOCYTES # BLD: 1.9 K/UL (ref 0.8–3.5)
LYMPHOCYTES NFR BLD: 23 % (ref 12–49)
MCH RBC QN AUTO: 27.5 PG (ref 26–34)
MCHC RBC AUTO-ENTMCNC: 30.3 G/DL (ref 30–36.5)
MCV RBC AUTO: 90.7 FL (ref 80–99)
MONOCYTES # BLD: 0.5 K/UL (ref 0–1)
MONOCYTES NFR BLD: 6 % (ref 5–13)
NEUTS SEG # BLD: 5.7 K/UL (ref 1.8–8)
NEUTS SEG NFR BLD: 67 % (ref 32–75)
NRBC # BLD: 0 K/UL (ref 0–0.01)
NRBC BLD-RTO: 0 PER 100 WBC
PLATELET # BLD AUTO: 246 K/UL (ref 150–400)
PMV BLD AUTO: 11.4 FL (ref 8.9–12.9)
POTASSIUM SERPL-SCNC: 3.8 MMOL/L (ref 3.5–5.1)
PROT SERPL-MCNC: 6.4 G/DL (ref 6.4–8.2)
RBC # BLD AUTO: 4.4 M/UL (ref 4.1–5.7)
SODIUM SERPL-SCNC: 141 MMOL/L (ref 136–145)
WBC # BLD AUTO: 8.4 K/UL (ref 4.1–11.1)

## 2023-03-29 NOTE — PROGRESS NOTES
Notify pt, the electrolytes are good  The kidney test is ok  The regular liver tests are normal now, some are still pending  The HbA1C is 6.1, better than last time, very good  The CBC is good  Cont current meds

## 2023-03-30 LAB
CERULOPLASMIN SERPL-MCNC: 18.7 MG/DL (ref 16–31)
MITOCHONDRIA M2 IGG SER-ACNC: <20 UNITS (ref 0–20)

## 2023-04-03 ENCOUNTER — HOSPITAL ENCOUNTER (OUTPATIENT)
Dept: GENERAL RADIOLOGY | Age: 78
End: 2023-04-03
Payer: MEDICARE

## 2023-04-03 DIAGNOSIS — M54.41 ACUTE MIDLINE LOW BACK PAIN WITH BILATERAL SCIATICA: ICD-10-CM

## 2023-04-03 DIAGNOSIS — M54.42 ACUTE MIDLINE LOW BACK PAIN WITH BILATERAL SCIATICA: ICD-10-CM

## 2023-04-03 PROCEDURE — 72100 X-RAY EXAM L-S SPINE 2/3 VWS: CPT

## 2023-04-04 NOTE — PROGRESS NOTES
Notify pt, the Xray is showing degenerative disc disease and hip osteoarthritis,  No acute abnormalities

## 2023-04-24 DIAGNOSIS — R25.2 MUSCLE CRAMPS AT NIGHT: ICD-10-CM

## 2023-04-25 RX ORDER — CYCLOBENZAPRINE HCL 5 MG
TABLET ORAL
Qty: 45 TABLET | Refills: 0 | Status: SHIPPED | OUTPATIENT
Start: 2023-04-25

## 2023-07-22 DIAGNOSIS — M15.9 POLYOSTEOARTHRITIS, UNSPECIFIED: ICD-10-CM

## 2023-07-22 DIAGNOSIS — K21.9 GASTRO-ESOPHAGEAL REFLUX DISEASE WITHOUT ESOPHAGITIS: ICD-10-CM

## 2023-07-22 DIAGNOSIS — R25.2 CRAMP AND SPASM: ICD-10-CM

## 2023-07-22 DIAGNOSIS — E78.5 HYPERLIPIDEMIA, UNSPECIFIED: ICD-10-CM

## 2023-07-22 DIAGNOSIS — I10 ESSENTIAL (PRIMARY) HYPERTENSION: ICD-10-CM

## 2023-07-23 RX ORDER — ATORVASTATIN CALCIUM 40 MG/1
TABLET, FILM COATED ORAL
Qty: 90 TABLET | Refills: 0 | Status: SHIPPED | OUTPATIENT
Start: 2023-07-23

## 2023-07-23 RX ORDER — LOSARTAN POTASSIUM 25 MG/1
TABLET ORAL
Qty: 180 TABLET | Refills: 0 | Status: SHIPPED | OUTPATIENT
Start: 2023-07-23

## 2023-07-23 RX ORDER — DICLOFENAC SODIUM 75 MG/1
TABLET, DELAYED RELEASE ORAL
Qty: 30 TABLET | Refills: 0 | Status: SHIPPED | OUTPATIENT
Start: 2023-07-23 | End: 2023-08-07

## 2023-07-23 RX ORDER — FAMOTIDINE 40 MG/1
TABLET, FILM COATED ORAL
Qty: 90 TABLET | Refills: 0 | Status: SHIPPED | OUTPATIENT
Start: 2023-07-23

## 2023-07-23 RX ORDER — CYCLOBENZAPRINE HCL 5 MG
TABLET ORAL
Qty: 45 TABLET | Refills: 0 | Status: SHIPPED | OUTPATIENT
Start: 2023-07-23

## 2023-07-27 ENCOUNTER — TELEPHONE (OUTPATIENT)
Dept: FAMILY MEDICINE CLINIC | Age: 78
End: 2023-07-27

## 2023-07-27 NOTE — TELEPHONE ENCOUNTER
PA initiated via Cover My Meds. Your information has been submitted to [employer]. [employer] will review the request and will issue a decision, typically within 1-3 days from your submission. You can check the updated outcome later by reopening this request.    If [employer] has not responded in 1-3 days or if you have any questions about your ePA request, please contact [employer] D at [phone]. If you think there may be a problem with your PA request, use our live chat feature at the bottom right.

## 2023-08-05 DIAGNOSIS — M15.9 POLYOSTEOARTHRITIS, UNSPECIFIED: ICD-10-CM

## 2023-08-05 DIAGNOSIS — I10 ESSENTIAL (PRIMARY) HYPERTENSION: ICD-10-CM

## 2023-08-07 RX ORDER — HYDROCHLOROTHIAZIDE 25 MG/1
TABLET ORAL
Qty: 90 TABLET | Refills: 0 | Status: SHIPPED | OUTPATIENT
Start: 2023-08-07

## 2023-08-07 RX ORDER — DICLOFENAC SODIUM 75 MG/1
TABLET, DELAYED RELEASE ORAL
Qty: 90 TABLET | Refills: 0 | Status: SHIPPED | OUTPATIENT
Start: 2023-08-07

## 2023-09-18 DIAGNOSIS — R25.2 CRAMP AND SPASM: ICD-10-CM

## 2023-09-18 RX ORDER — CYCLOBENZAPRINE HCL 5 MG
TABLET ORAL
Qty: 45 TABLET | Refills: 0 | Status: SHIPPED | OUTPATIENT
Start: 2023-09-18

## 2023-10-13 DIAGNOSIS — K21.9 GASTRO-ESOPHAGEAL REFLUX DISEASE WITHOUT ESOPHAGITIS: ICD-10-CM

## 2023-10-13 DIAGNOSIS — R25.2 CRAMP AND SPASM: ICD-10-CM

## 2023-10-13 DIAGNOSIS — I10 ESSENTIAL (PRIMARY) HYPERTENSION: ICD-10-CM

## 2023-10-13 DIAGNOSIS — E78.5 HYPERLIPIDEMIA, UNSPECIFIED: ICD-10-CM

## 2023-10-14 RX ORDER — CYCLOBENZAPRINE HCL 5 MG
TABLET ORAL
Qty: 45 TABLET | Refills: 0 | OUTPATIENT
Start: 2023-10-14

## 2023-10-14 RX ORDER — LOSARTAN POTASSIUM 25 MG/1
TABLET ORAL
Qty: 180 TABLET | Refills: 0 | OUTPATIENT
Start: 2023-10-14

## 2023-10-14 RX ORDER — ATORVASTATIN CALCIUM 40 MG/1
TABLET, FILM COATED ORAL
Qty: 90 TABLET | Refills: 0 | OUTPATIENT
Start: 2023-10-14

## 2023-10-14 RX ORDER — FAMOTIDINE 40 MG/1
TABLET, FILM COATED ORAL
Qty: 90 TABLET | Refills: 0 | OUTPATIENT
Start: 2023-10-14

## 2023-11-02 ENCOUNTER — OFFICE VISIT (OUTPATIENT)
Dept: FAMILY MEDICINE CLINIC | Age: 78
End: 2023-11-02
Payer: MEDICARE

## 2023-11-02 VITALS
WEIGHT: 240 LBS | RESPIRATION RATE: 12 BRPM | HEART RATE: 93 BPM | HEIGHT: 68 IN | TEMPERATURE: 98.2 F | BODY MASS INDEX: 36.37 KG/M2 | OXYGEN SATURATION: 97 % | DIASTOLIC BLOOD PRESSURE: 73 MMHG | SYSTOLIC BLOOD PRESSURE: 135 MMHG

## 2023-11-02 DIAGNOSIS — Z00.00 MEDICARE ANNUAL WELLNESS VISIT, SUBSEQUENT: Primary | ICD-10-CM

## 2023-11-02 DIAGNOSIS — E66.01 CLASS 2 SEVERE OBESITY WITH SERIOUS COMORBIDITY AND BODY MASS INDEX (BMI) OF 36.0 TO 36.9 IN ADULT, UNSPECIFIED OBESITY TYPE (HCC): ICD-10-CM

## 2023-11-02 DIAGNOSIS — Z87.891 PERSONAL HISTORY OF TOBACCO USE: ICD-10-CM

## 2023-11-02 DIAGNOSIS — Z11.59 NEED FOR HEPATITIS C SCREENING TEST: ICD-10-CM

## 2023-11-02 DIAGNOSIS — M79.641 RIGHT HAND PAIN: ICD-10-CM

## 2023-11-02 DIAGNOSIS — I10 ESSENTIAL (PRIMARY) HYPERTENSION: ICD-10-CM

## 2023-11-02 DIAGNOSIS — K21.9 GASTRO-ESOPHAGEAL REFLUX DISEASE WITHOUT ESOPHAGITIS: ICD-10-CM

## 2023-11-02 DIAGNOSIS — E11.9 TYPE 2 DIABETES MELLITUS WITHOUT COMPLICATION, WITHOUT LONG-TERM CURRENT USE OF INSULIN (HCC): ICD-10-CM

## 2023-11-02 DIAGNOSIS — E78.00 HYPERCHOLESTEROLEMIA: ICD-10-CM

## 2023-11-02 DIAGNOSIS — R25.2 CRAMP AND SPASM: ICD-10-CM

## 2023-11-02 PROCEDURE — 99214 OFFICE O/P EST MOD 30 MIN: CPT | Performed by: FAMILY MEDICINE

## 2023-11-02 PROCEDURE — G0296 VISIT TO DETERM LDCT ELIG: HCPCS | Performed by: FAMILY MEDICINE

## 2023-11-02 PROCEDURE — 3075F SYST BP GE 130 - 139MM HG: CPT | Performed by: FAMILY MEDICINE

## 2023-11-02 PROCEDURE — 1036F TOBACCO NON-USER: CPT | Performed by: FAMILY MEDICINE

## 2023-11-02 PROCEDURE — G0439 PPPS, SUBSEQ VISIT: HCPCS | Performed by: FAMILY MEDICINE

## 2023-11-02 PROCEDURE — 3044F HG A1C LEVEL LT 7.0%: CPT | Performed by: FAMILY MEDICINE

## 2023-11-02 PROCEDURE — 1123F ACP DISCUSS/DSCN MKR DOCD: CPT | Performed by: FAMILY MEDICINE

## 2023-11-02 PROCEDURE — G8484 FLU IMMUNIZE NO ADMIN: HCPCS | Performed by: FAMILY MEDICINE

## 2023-11-02 PROCEDURE — G8427 DOCREV CUR MEDS BY ELIG CLIN: HCPCS | Performed by: FAMILY MEDICINE

## 2023-11-02 PROCEDURE — G8417 CALC BMI ABV UP PARAM F/U: HCPCS | Performed by: FAMILY MEDICINE

## 2023-11-02 PROCEDURE — 3078F DIAST BP <80 MM HG: CPT | Performed by: FAMILY MEDICINE

## 2023-11-02 RX ORDER — HYDROCHLOROTHIAZIDE 25 MG/1
25 TABLET ORAL DAILY
Qty: 90 TABLET | Refills: 1 | Status: SHIPPED | OUTPATIENT
Start: 2023-11-02

## 2023-11-02 RX ORDER — BACLOFEN 10 MG/1
5 TABLET ORAL 2 TIMES DAILY
Qty: 60 TABLET | Refills: 0 | Status: SHIPPED | OUTPATIENT
Start: 2023-11-02

## 2023-11-02 RX ORDER — LOSARTAN POTASSIUM 25 MG/1
25 TABLET ORAL 2 TIMES DAILY
Qty: 180 TABLET | Refills: 1 | Status: SHIPPED | OUTPATIENT
Start: 2023-11-02

## 2023-11-02 RX ORDER — DICLOFENAC SODIUM 75 MG/1
75 TABLET, DELAYED RELEASE ORAL 2 TIMES DAILY
Qty: 180 TABLET | Refills: 0 | Status: SHIPPED | OUTPATIENT
Start: 2023-11-02

## 2023-11-02 RX ORDER — ATORVASTATIN CALCIUM 40 MG/1
40 TABLET, FILM COATED ORAL DAILY
Qty: 90 TABLET | Refills: 1 | Status: SHIPPED | OUTPATIENT
Start: 2023-11-02

## 2023-11-02 RX ORDER — FAMOTIDINE 40 MG/1
40 TABLET, FILM COATED ORAL DAILY
Qty: 90 TABLET | Refills: 1 | Status: SHIPPED | OUTPATIENT
Start: 2023-11-02

## 2023-11-02 ASSESSMENT — PATIENT HEALTH QUESTIONNAIRE - PHQ9
SUM OF ALL RESPONSES TO PHQ QUESTIONS 1-9: 0
SUM OF ALL RESPONSES TO PHQ9 QUESTIONS 1 & 2: 0
SUM OF ALL RESPONSES TO PHQ QUESTIONS 1-9: 0
SUM OF ALL RESPONSES TO PHQ QUESTIONS 1-9: 0
1. LITTLE INTEREST OR PLEASURE IN DOING THINGS: 0
SUM OF ALL RESPONSES TO PHQ QUESTIONS 1-9: 0
2. FEELING DOWN, DEPRESSED OR HOPELESS: 0

## 2023-11-02 ASSESSMENT — ENCOUNTER SYMPTOMS
GASTROINTESTINAL NEGATIVE: 1
WHEEZING: 0
SHORTNESS OF BREATH: 1
COUGH: 0

## 2023-11-02 ASSESSMENT — LIFESTYLE VARIABLES
HOW OFTEN DO YOU HAVE A DRINK CONTAINING ALCOHOL: NEVER
HOW MANY STANDARD DRINKS CONTAINING ALCOHOL DO YOU HAVE ON A TYPICAL DAY: PATIENT DOES NOT DRINK

## 2023-11-02 NOTE — PROGRESS NOTES
Sunil Jackson. is a 69 y.o. male who presents to the office today with the following:  Chief Complaint   Patient presents with    Medicare AWV            HPI  HM reviewed    DM  No BS checks  On Metformin 1000 bid    HTN  BP is good  No checks at home    Hyperlipidemia  Not fasting today    Due for LD CT chest    Needs Flexeril for spasms in back  Never tried Baclofen but willing to try    Had Cataract surgery and now getting shots in right eye    Obesity  Willing to try Ozempic    Pain in right hands with fingers triggering occ  Would like to increase Diclofenac  Last Creatinine nl    Review of Systems   Constitutional:  Negative for unexpected weight change. HENT:  Negative for congestion. Respiratory:  Positive for shortness of breath (when doing a whole lot of walking, not significatly worse). Negative for cough and wheezing. Cardiovascular:  Negative for chest pain. Gastrointestinal: Negative. Genitourinary: Negative. Neurological:  Negative for dizziness and headaches. Psychiatric/Behavioral:  Negative for dysphoric mood. The patient is not nervous/anxious. See HPI.     Past Medical History:   Diagnosis Date    Adverse effect of anesthesia     slow to wake after lipoma removal 1/2020    Arrhythmia     Arthritis     BPH (benign prostatic hyperplasia) 01/05/2015    Chronic pain     Diabetes ([address])     Type II    Fatty liver     GERD (gastroesophageal reflux disease)     Hiatal hernia     Hypercholesterolemia     Hypertension     Ill-defined condition 2017    lt shoulder lipoma    Lipoma of back 11/2018    left shoulderblade area    Rotator cuff tear        Past Surgical History:   Procedure Laterality Date    CATARACT EXTRACTION Bilateral 12/2022    CHOLECYSTECTOMY  2001    CYST REMOVAL  10/10/2017    HERNIA REPAIR  2001    MOHS SURGERY      ORTHOPEDIC SURGERY  2015     lt  shoulder replacement    OTHER SURGICAL HISTORY  01/08/2020    Excisional biopsy of recurrent

## 2023-11-03 LAB
ANION GAP SERPL CALC-SCNC: 8 MMOL/L (ref 5–15)
BUN SERPL-MCNC: 24 MG/DL (ref 6–20)
BUN/CREAT SERPL: 18 (ref 12–20)
CALCIUM SERPL-MCNC: 9 MG/DL (ref 8.5–10.1)
CHLORIDE SERPL-SCNC: 100 MMOL/L (ref 97–108)
CHOLEST SERPL-MCNC: 126 MG/DL
CO2 SERPL-SCNC: 32 MMOL/L (ref 21–32)
CREAT SERPL-MCNC: 1.35 MG/DL (ref 0.7–1.3)
EST. AVERAGE GLUCOSE BLD GHB EST-MCNC: 148 MG/DL
GLUCOSE SERPL-MCNC: 197 MG/DL (ref 65–100)
HBA1C MFR BLD: 6.8 % (ref 4–5.6)
HCV AB SER IA-ACNC: 0.02 INDEX
HCV AB SERPL QL IA: NONREACTIVE
HDLC SERPL-MCNC: 23 MG/DL
HDLC SERPL: 5.5 (ref 0–5)
LDLC SERPL CALC-MCNC: ABNORMAL MG/DL (ref 0–100)
LDLC SERPL DIRECT ASSAY-MCNC: 56 MG/DL (ref 0–100)
POTASSIUM SERPL-SCNC: 3.6 MMOL/L (ref 3.5–5.1)
SODIUM SERPL-SCNC: 140 MMOL/L (ref 136–145)
TRIGL SERPL-MCNC: 535 MG/DL
VLDLC SERPL CALC-MCNC: ABNORMAL MG/DL

## 2023-11-09 ENCOUNTER — TELEPHONE (OUTPATIENT)
Dept: FAMILY MEDICINE CLINIC | Age: 78
End: 2023-11-09

## 2023-11-09 DIAGNOSIS — E11.9 TYPE 2 DIABETES MELLITUS WITHOUT COMPLICATION, WITHOUT LONG-TERM CURRENT USE OF INSULIN (HCC): Primary | ICD-10-CM

## 2023-11-09 RX ORDER — LIRAGLUTIDE 6 MG/ML
1.2 INJECTION SUBCUTANEOUS DAILY
Qty: 2 ADJUSTABLE DOSE PRE-FILLED PEN SYRINGE | Refills: 3 | Status: SHIPPED | OUTPATIENT
Start: 2023-11-09 | End: 2023-11-09

## 2023-11-09 NOTE — TELEPHONE ENCOUNTER
Harper, wife (HIPAA verified) called to say that the Semaglutide prescribed on 11.2.2023 is too expensive.  Can something else be prescribed?  Call back at [phone]

## 2023-11-10 NOTE — TELEPHONE ENCOUNTER
Pt's wife calls back. I relayed Dr. Galvez's message. She had questions regarding all meds including one not talked about Jardiance.     I let her know that she needed to call her insurance company to get the formulary medication list for this drug class.    I also told her the price for meds can vary based on prior auth  and she should ask insurance about that as well.     No further action until we here from pt about meds.

## 2023-11-20 DIAGNOSIS — M79.641 RIGHT HAND PAIN: ICD-10-CM

## 2023-11-20 DIAGNOSIS — R25.2 CRAMP AND SPASM: ICD-10-CM

## 2023-11-20 RX ORDER — BACLOFEN 10 MG/1
TABLET ORAL
Qty: 60 TABLET | Refills: 0 | OUTPATIENT
Start: 2023-11-20

## 2023-11-20 RX ORDER — CYCLOBENZAPRINE HCL 5 MG
TABLET ORAL
Qty: 45 TABLET | Refills: 0 | OUTPATIENT
Start: 2023-11-20

## 2023-11-20 RX ORDER — DICLOFENAC SODIUM 75 MG/1
75 TABLET, DELAYED RELEASE ORAL DAILY
Qty: 90 TABLET | OUTPATIENT
Start: 2023-11-20

## 2023-11-20 NOTE — TELEPHONE ENCOUNTER
Patient requesting refill on     Requested Prescriptions     Pending Prescriptions Disp Refills    baclofen (LIORESAL) 10 MG tablet [Pharmacy Med Name: BACLOFEN 10 MG TABLET] 60 tablet 0     Sig: TAKE 1/2 TABLET TWICE A DAY BY MOUTH    cyclobenzaprine (FLEXERIL) 5 MG tablet [Pharmacy Med Name: CYCLOBENZAPRINE 5 MG TABLET] 45 tablet 0     Sig: TAKE 1 TABLET BY MOUTH THREE TIMES A DAY AS NEEDED FOR MUSCLE SPASM    diclofenac (VOLTAREN) 75 MG EC tablet [Pharmacy Med Name: DICLOFENAC SOD EC 75 MG TAB] 90 tablet      Sig: TAKE 1 TABLET BY MOUTH EVERY DAY        Last OV 11/2/2023

## 2023-11-21 DIAGNOSIS — Z87.891 PERSONAL HISTORY OF TOBACCO USE: ICD-10-CM

## 2023-12-07 ENCOUNTER — OFFICE VISIT (OUTPATIENT)
Dept: FAMILY MEDICINE CLINIC | Age: 78
End: 2023-12-07
Payer: MEDICARE

## 2023-12-07 VITALS
HEIGHT: 67 IN | RESPIRATION RATE: 12 BRPM | BODY MASS INDEX: 37.2 KG/M2 | SYSTOLIC BLOOD PRESSURE: 130 MMHG | HEART RATE: 87 BPM | WEIGHT: 237 LBS | DIASTOLIC BLOOD PRESSURE: 67 MMHG | TEMPERATURE: 98 F

## 2023-12-07 DIAGNOSIS — M25.512 CHRONIC LEFT SHOULDER PAIN: ICD-10-CM

## 2023-12-07 DIAGNOSIS — E11.9 TYPE 2 DIABETES MELLITUS WITHOUT COMPLICATION, WITHOUT LONG-TERM CURRENT USE OF INSULIN (HCC): Primary | ICD-10-CM

## 2023-12-07 DIAGNOSIS — N28.9 RENAL INSUFFICIENCY: ICD-10-CM

## 2023-12-07 DIAGNOSIS — H61.22 IMPACTED CERUMEN OF LEFT EAR: ICD-10-CM

## 2023-12-07 DIAGNOSIS — G89.29 CHRONIC LEFT SHOULDER PAIN: ICD-10-CM

## 2023-12-07 DIAGNOSIS — M19.90 ARTHRITIS: ICD-10-CM

## 2023-12-07 PROCEDURE — 99213 OFFICE O/P EST LOW 20 MIN: CPT | Performed by: FAMILY MEDICINE

## 2023-12-07 PROCEDURE — 3044F HG A1C LEVEL LT 7.0%: CPT | Performed by: FAMILY MEDICINE

## 2023-12-07 PROCEDURE — 69210 REMOVE IMPACTED EAR WAX UNI: CPT | Performed by: FAMILY MEDICINE

## 2023-12-07 PROCEDURE — 3075F SYST BP GE 130 - 139MM HG: CPT | Performed by: FAMILY MEDICINE

## 2023-12-07 PROCEDURE — G8484 FLU IMMUNIZE NO ADMIN: HCPCS | Performed by: FAMILY MEDICINE

## 2023-12-07 PROCEDURE — 3078F DIAST BP <80 MM HG: CPT | Performed by: FAMILY MEDICINE

## 2023-12-07 PROCEDURE — G8417 CALC BMI ABV UP PARAM F/U: HCPCS | Performed by: FAMILY MEDICINE

## 2023-12-07 PROCEDURE — G8428 CUR MEDS NOT DOCUMENT: HCPCS | Performed by: FAMILY MEDICINE

## 2023-12-07 PROCEDURE — 1036F TOBACCO NON-USER: CPT | Performed by: FAMILY MEDICINE

## 2023-12-07 PROCEDURE — 1123F ACP DISCUSS/DSCN MKR DOCD: CPT | Performed by: FAMILY MEDICINE

## 2023-12-07 ASSESSMENT — PATIENT HEALTH QUESTIONNAIRE - PHQ9
1. LITTLE INTEREST OR PLEASURE IN DOING THINGS: 0
SUM OF ALL RESPONSES TO PHQ QUESTIONS 1-9: 0
SUM OF ALL RESPONSES TO PHQ9 QUESTIONS 1 & 2: 0
SUM OF ALL RESPONSES TO PHQ QUESTIONS 1-9: 0
2. FEELING DOWN, DEPRESSED OR HOPELESS: 0

## 2023-12-07 NOTE — PROGRESS NOTES
Danya Olvera. is a 69 y.o. male who presents to the office today with the following:  Chief Complaint   Patient presents with    Diabetes     Follow up    Shoulder Pain     L shoulder       Diabetes    Shoulder Pain       DM  Recent HbA1C 6.8 a mo ago  Last visit Ozempic started but injectables too expensive too expensive  Insurance would only cover Jardiance    And Baclofen started instead of Flexeril  And helping per pt    And Diclofenac increased to bid but advised to stop d/t Creatinine at 1.35  Pt still taking it for pain in hands    Ears stopped up and sometimes ear ache  Tried Peroxide but did not help    left shoulder pain  Had shot in it a year ago  Has seen Ortho specialist in the past  Had surgery and has metal in it   Was work [employer] comp issue    Review of Systems    See HPI. Past Medical History:   Diagnosis Date    Adverse effect of anesthesia     slow to wake after lipoma removal 1/2020    Arrhythmia     Arthritis     BPH (benign prostatic hyperplasia) 01/05/2015    Chronic pain     Diabetes ([address])     Type II    Fatty liver     GERD (gastroesophageal reflux disease)     Hiatal hernia     Hypercholesterolemia     Hypertension     Ill-defined condition 2017    lt shoulder lipoma    Lipoma of back 11/2018    left shoulderblade area    Rotator cuff tear        Past Surgical History:   Procedure Laterality Date    CATARACT EXTRACTION Bilateral 12/2022    CHOLECYSTECTOMY  2001    CYST REMOVAL  10/10/2017    HERNIA REPAIR  2001    MOHS SURGERY      ORTHOPEDIC SURGERY  2015     lt  shoulder replacement    OTHER SURGICAL HISTORY  01/08/2020    Excisional biopsy of recurrent intramuscular left upper back mass.     PRE-MALIGNANT / BENIGN SKIN LESION EXCISION  10/10/2017    Excision left shoulder cyst- Benito Layton MD    ROTATOR CUFF REPAIR      rt shoulder    UPPER GI ENDOSCOPY,DILATN W GUIDE  07/06/2021            Allergies   Allergen Reactions    Bupropion Other (See Comments)     Double vision

## 2023-12-29 DIAGNOSIS — R25.2 CRAMP AND SPASM: ICD-10-CM

## 2023-12-29 RX ORDER — BACLOFEN 10 MG/1
TABLET ORAL
Qty: 60 TABLET | Refills: 0 | Status: SHIPPED | OUTPATIENT
Start: 2023-12-29

## 2023-12-29 NOTE — TELEPHONE ENCOUNTER
Requested Prescriptions     Pending Prescriptions Disp Refills    baclofen (LIORESAL) 10 MG tablet [Pharmacy Med Name: BACLOFEN 10 MG TABLET] 60 tablet 0     Sig: TAKE 1/2 TABLET TWICE A DAY BY MOUTH     Last seen:  12/7/23

## 2024-01-05 NOTE — TELEPHONE ENCOUNTER
NICOLLE Bull   PA Case ID: [id number] -   Rx #: [id number]  Need help?  Call us at [phone]    Outcome  Approved today    Your request has been approved    Drug  Cyclobenzaprine HCl 5MG tablets    Form  [employer] Electronic PA Form (5136 NCPDP)
PA initiated via Cover My Meds. NICOLLE AYALA Case ID: [id number] -   Rx #: [id number]  Need help?  Call us at [phone]    Status  Sent to Plan today    Drug  Cyclobenzaprine HCl 5MG tablets    Form  [employer] Electronic PA Form (9817 NCPDP)
Prior Auth required for Cyclobenzaprine HCI 5mg.   KEy: WAYLON
no edema noted per flowsheets

## 2024-01-30 DIAGNOSIS — M15.9 POLYOSTEOARTHRITIS, UNSPECIFIED: ICD-10-CM

## 2024-01-30 RX ORDER — DICLOFENAC SODIUM 75 MG/1
TABLET, DELAYED RELEASE ORAL
Qty: 30 TABLET | Refills: 0 | OUTPATIENT
Start: 2024-01-30

## 2024-02-13 ENCOUNTER — OFFICE VISIT (OUTPATIENT)
Dept: FAMILY MEDICINE CLINIC | Age: 79
End: 2024-02-13
Payer: MEDICARE

## 2024-02-13 VITALS
OXYGEN SATURATION: 91 % | DIASTOLIC BLOOD PRESSURE: 72 MMHG | HEIGHT: 67 IN | RESPIRATION RATE: 16 BRPM | WEIGHT: 232.4 LBS | HEART RATE: 95 BPM | SYSTOLIC BLOOD PRESSURE: 130 MMHG | BODY MASS INDEX: 36.47 KG/M2 | TEMPERATURE: 98.8 F

## 2024-02-13 DIAGNOSIS — M79.671 PAIN OF RIGHT HEEL: ICD-10-CM

## 2024-02-13 DIAGNOSIS — E66.01 SEVERE OBESITY (BMI 35.0-39.9) WITH COMORBIDITY (HCC): ICD-10-CM

## 2024-02-13 DIAGNOSIS — E78.00 HYPERCHOLESTEROLEMIA: Primary | ICD-10-CM

## 2024-02-13 DIAGNOSIS — R05.1 ACUTE COUGH: ICD-10-CM

## 2024-02-13 DIAGNOSIS — E11.9 TYPE 2 DIABETES MELLITUS WITHOUT COMPLICATION, WITHOUT LONG-TERM CURRENT USE OF INSULIN (HCC): ICD-10-CM

## 2024-02-13 PROCEDURE — G8417 CALC BMI ABV UP PARAM F/U: HCPCS | Performed by: FAMILY MEDICINE

## 2024-02-13 PROCEDURE — 3078F DIAST BP <80 MM HG: CPT | Performed by: FAMILY MEDICINE

## 2024-02-13 PROCEDURE — G8484 FLU IMMUNIZE NO ADMIN: HCPCS | Performed by: FAMILY MEDICINE

## 2024-02-13 PROCEDURE — 99214 OFFICE O/P EST MOD 30 MIN: CPT | Performed by: FAMILY MEDICINE

## 2024-02-13 PROCEDURE — 1036F TOBACCO NON-USER: CPT | Performed by: FAMILY MEDICINE

## 2024-02-13 PROCEDURE — 3075F SYST BP GE 130 - 139MM HG: CPT | Performed by: FAMILY MEDICINE

## 2024-02-13 PROCEDURE — G8428 CUR MEDS NOT DOCUMENT: HCPCS | Performed by: FAMILY MEDICINE

## 2024-02-13 PROCEDURE — 1123F ACP DISCUSS/DSCN MKR DOCD: CPT | Performed by: FAMILY MEDICINE

## 2024-02-13 RX ORDER — AZITHROMYCIN 250 MG/1
250 TABLET, FILM COATED ORAL SEE ADMIN INSTRUCTIONS
Qty: 6 TABLET | Refills: 0 | Status: SHIPPED | OUTPATIENT
Start: 2024-02-13 | End: 2024-02-18

## 2024-02-13 NOTE — PROGRESS NOTES
Jabari Alvarado Jr. is a 78 y.o. male who presents to the office today with the following:  Chief Complaint   Patient presents with    Foot Pain     Right foot pain in the center of the heal, possible bone spur    Also has had a cough for a week, no other symptoms       Foot Pain   Pertinent negatives include no fever.     Hyperlipidemia  Last   On Lipitor 40  Not fasting today    Obesity  Exercising a little  No change in weight    Last Creatinine 1.35    DM  Last HbA1C 6.8  Due for recheck in 3 mo    Bone spur under right heel in middle of plantar side for 1.5 w  Does not see Podiatrist    Cough for 1.5 w  Productive of green mucus  No other sy      Review of Systems   Constitutional:  Negative for fever.   HENT:  Negative for congestion and sore throat.    Respiratory:  Positive for cough (productive of green mucus) and shortness of breath (not unusual). Negative for wheezing.    Cardiovascular:  Negative for chest pain.   Gastrointestinal:  Negative for abdominal pain, diarrhea, nausea and vomiting.   Musculoskeletal:  Positive for arthralgias. Negative for myalgias.   Neurological:  Positive for headaches (with coughing a lot). Negative for dizziness.       See HPI.    Past Medical History:   Diagnosis Date    Adverse effect of anesthesia     slow to wake after lipoma removal 1/2020    Arrhythmia     Arthritis     BPH (benign prostatic hyperplasia) 01/05/2015    Chronic pain     Diabetes (HCC)     Type II    Fatty liver     GERD (gastroesophageal reflux disease)     Hiatal hernia     Hypercholesterolemia     Hypertension     Ill-defined condition 2017    lt shoulder lipoma    Lipoma of back 11/2018    left shoulderblade area    Rotator cuff tear        Past Surgical History:   Procedure Laterality Date    CATARACT EXTRACTION Bilateral 12/2022    CHOLECYSTECTOMY  2001    CYST REMOVAL  10/10/2017    HERNIA REPAIR  2001    [facility]      ORTHOPEDIC SURGERY  2015     lt  shoulder replacement    OTHER

## 2024-02-13 NOTE — PROGRESS NOTES
\"Have you been to the ER, urgent care clinic since your last visit?  Hospitalized since your last visit?\"    NO    “Have you seen or consulted any other health care providers outside of [facility] since your last visit?”    NO

## 2024-02-14 ENCOUNTER — HOSPITAL ENCOUNTER (OUTPATIENT)
Facility: HOSPITAL | Age: 79
Discharge: HOME | End: 2024-02-17
Payer: MEDICARE

## 2024-02-14 DIAGNOSIS — M79.671 PAIN OF RIGHT HEEL: ICD-10-CM

## 2024-02-14 PROCEDURE — 73650 X-RAY EXAM OF HEEL: CPT

## 2024-02-15 DIAGNOSIS — E11.9 TYPE 2 DIABETES MELLITUS WITHOUT COMPLICATIONS (HCC): ICD-10-CM

## 2024-02-22 ENCOUNTER — OFFICE VISIT (OUTPATIENT)
Dept: FAMILY MEDICINE CLINIC | Age: 79
End: 2024-02-22
Payer: MEDICARE

## 2024-02-22 VITALS
HEART RATE: 88 BPM | SYSTOLIC BLOOD PRESSURE: 111 MMHG | WEIGHT: 227 LBS | TEMPERATURE: 98 F | OXYGEN SATURATION: 94 % | DIASTOLIC BLOOD PRESSURE: 67 MMHG | BODY MASS INDEX: 34.4 KG/M2 | RESPIRATION RATE: 12 BRPM | HEIGHT: 68 IN

## 2024-02-22 DIAGNOSIS — E78.00 HYPERCHOLESTEROLEMIA: ICD-10-CM

## 2024-02-22 DIAGNOSIS — R05.1 ACUTE COUGH: Primary | ICD-10-CM

## 2024-02-22 PROCEDURE — 3078F DIAST BP <80 MM HG: CPT | Performed by: FAMILY MEDICINE

## 2024-02-22 PROCEDURE — 99213 OFFICE O/P EST LOW 20 MIN: CPT | Performed by: FAMILY MEDICINE

## 2024-02-22 PROCEDURE — G8417 CALC BMI ABV UP PARAM F/U: HCPCS | Performed by: FAMILY MEDICINE

## 2024-02-22 PROCEDURE — 1036F TOBACCO NON-USER: CPT | Performed by: FAMILY MEDICINE

## 2024-02-22 PROCEDURE — G8484 FLU IMMUNIZE NO ADMIN: HCPCS | Performed by: FAMILY MEDICINE

## 2024-02-22 PROCEDURE — 1123F ACP DISCUSS/DSCN MKR DOCD: CPT | Performed by: FAMILY MEDICINE

## 2024-02-22 PROCEDURE — G8428 CUR MEDS NOT DOCUMENT: HCPCS | Performed by: FAMILY MEDICINE

## 2024-02-22 PROCEDURE — 3074F SYST BP LT 130 MM HG: CPT | Performed by: FAMILY MEDICINE

## 2024-02-22 RX ORDER — LEVOFLOXACIN 750 MG/1
750 TABLET, FILM COATED ORAL DAILY
Qty: 7 TABLET | Refills: 0 | Status: SHIPPED | OUTPATIENT
Start: 2024-02-22 | End: 2024-02-29

## 2024-02-22 ASSESSMENT — ENCOUNTER SYMPTOMS
SHORTNESS OF BREATH: 0
WHEEZING: 1
VOMITING: 0
COUGH: 1
NAUSEA: 0
SORE THROAT: 0
DIARRHEA: 0

## 2024-02-22 NOTE — PROGRESS NOTES
Successful venipuncture in patient's R ac X 1 sticks.  The patient tolerated this procedure w/o c/o pain or discomfort.\"Have you been to the ER, urgent care clinic since your last visit?  Hospitalized since your last visit?\"    NO    “Have you seen or consulted any other health care providers outside of [facility] since your last visit?”    NO         
(Medical): No     Lack of Transportation (Non-Medical): No   Physical Activity: Inactive (2023)    Exercise Vital Sign     Days of Exercise per Week: 0 days     Minutes of Exercise per Session: 0 min   Housing Stability: Unknown (3/28/2023)    Housing Stability Vital Sign     Unable to Pay for Housing in the Last Year: No     Unstable Housing in the Last Year: No       Family History   Problem Relation Age of Onset    Cancer Mother         bone & gland    Cancer Niece         bone & brain -  age 42    Coronary Art Dis Brother     Diabetes Brother     Heart Disease Sister     Cancer Sister         breast & ovarian    Heart Disease Father          Physical Exam:  /67 (Site: Right Upper Arm, Position: Sitting, Cuff Size: Medium Adult)   Pulse 88   Temp 98 °F (36.7 °C)   Resp 12   Ht 1.727 m (5' 8\")   Wt 103 kg (227 lb)   SpO2 94%   BMI 34.52 kg/m²   Physical Exam  Vitals and nursing note reviewed.   Constitutional:       Appearance: He is obese.   HENT:      Head: Normocephalic and atraumatic.   Eyes:      Extraocular Movements: Extraocular movements intact.      Conjunctiva/sclera: Conjunctivae normal.   Cardiovascular:      Rate and Rhythm: Normal rate and regular rhythm.      Heart sounds: Normal heart sounds.   Pulmonary:      Effort: Pulmonary effort is normal.      Breath sounds: Normal breath sounds.   Neurological:      Mental Status: He is alert and oriented to person, place, and time.   Psychiatric:         Mood and Affect: Mood normal.         Behavior: Behavior normal.          Assessment/Plan:   Diagnosis Orders   1. Acute cough  levoFLOXacin (LEVAQUIN) 750 MG tablet      2. Hypercholesterolemia  Triglyceride          No follow-up provider specified.    Katy Moscoso MD

## 2024-02-23 LAB — TRIGL SERPL-MCNC: 268 MG/DL

## 2024-03-05 ENCOUNTER — OFFICE VISIT (OUTPATIENT)
Dept: FAMILY MEDICINE CLINIC | Age: 79
End: 2024-03-05
Payer: MEDICARE

## 2024-03-05 VITALS
WEIGHT: 227.2 LBS | OXYGEN SATURATION: 92 % | HEART RATE: 92 BPM | RESPIRATION RATE: 16 BRPM | DIASTOLIC BLOOD PRESSURE: 74 MMHG | SYSTOLIC BLOOD PRESSURE: 150 MMHG | BODY MASS INDEX: 34.43 KG/M2 | HEIGHT: 68 IN | TEMPERATURE: 97.3 F

## 2024-03-05 DIAGNOSIS — J45.21 MILD INTERMITTENT REACTIVE AIRWAY DISEASE WITH ACUTE EXACERBATION: Primary | ICD-10-CM

## 2024-03-05 DIAGNOSIS — R05.1 ACUTE COUGH: ICD-10-CM

## 2024-03-05 PROCEDURE — 1123F ACP DISCUSS/DSCN MKR DOCD: CPT | Performed by: FAMILY MEDICINE

## 2024-03-05 PROCEDURE — G8417 CALC BMI ABV UP PARAM F/U: HCPCS | Performed by: FAMILY MEDICINE

## 2024-03-05 PROCEDURE — 3078F DIAST BP <80 MM HG: CPT | Performed by: FAMILY MEDICINE

## 2024-03-05 PROCEDURE — G8484 FLU IMMUNIZE NO ADMIN: HCPCS | Performed by: FAMILY MEDICINE

## 2024-03-05 PROCEDURE — 99213 OFFICE O/P EST LOW 20 MIN: CPT | Performed by: FAMILY MEDICINE

## 2024-03-05 PROCEDURE — 1036F TOBACCO NON-USER: CPT | Performed by: FAMILY MEDICINE

## 2024-03-05 PROCEDURE — G8427 DOCREV CUR MEDS BY ELIG CLIN: HCPCS | Performed by: FAMILY MEDICINE

## 2024-03-05 PROCEDURE — 3077F SYST BP >= 140 MM HG: CPT | Performed by: FAMILY MEDICINE

## 2024-03-05 RX ORDER — PREDNISONE 20 MG/1
20 TABLET ORAL 2 TIMES DAILY
Qty: 10 TABLET | Refills: 0 | Status: SHIPPED | OUTPATIENT
Start: 2024-03-05 | End: 2024-03-10

## 2024-03-05 RX ORDER — DOXYCYCLINE HYCLATE 100 MG
100 TABLET ORAL 2 TIMES DAILY
Qty: 20 TABLET | Refills: 0 | Status: SHIPPED | OUTPATIENT
Start: 2024-03-05 | End: 2024-03-15

## 2024-03-05 RX ORDER — BENZONATATE 200 MG/1
200 CAPSULE ORAL 3 TIMES DAILY PRN
Qty: 20 CAPSULE | Refills: 0 | Status: SHIPPED | OUTPATIENT
Start: 2024-03-05 | End: 2024-03-12

## 2024-03-05 ASSESSMENT — ENCOUNTER SYMPTOMS
SINUS PAIN: 0
WHEEZING: 1
SHORTNESS OF BREATH: 0
SINUS PRESSURE: 0
COUGH: 1
BACK PAIN: 1

## 2024-03-05 ASSESSMENT — PATIENT HEALTH QUESTIONNAIRE - PHQ9
SUM OF ALL RESPONSES TO PHQ QUESTIONS 1-9: 0
SUM OF ALL RESPONSES TO PHQ QUESTIONS 1-9: 0
2. FEELING DOWN, DEPRESSED OR HOPELESS: 0
SUM OF ALL RESPONSES TO PHQ9 QUESTIONS 1 & 2: 0
SUM OF ALL RESPONSES TO PHQ QUESTIONS 1-9: 0
SUM OF ALL RESPONSES TO PHQ QUESTIONS 1-9: 0
1. LITTLE INTEREST OR PLEASURE IN DOING THINGS: 0

## 2024-03-05 NOTE — PROGRESS NOTES
\"Have you been to the ER, urgent care clinic since your last visit?  Hospitalized since your last visit?\"    NO    “Have you seen or consulted any other health care providers outside of [facility] since your last visit?”    NO

## 2024-03-05 NOTE — PROGRESS NOTES
Jabari Alvarado Jr. is a 78 y.o. male who presents to the office today with the following:  Chief Complaint   Patient presents with   • Cough     Still having a bad cough.  Has taken 2 ABX's for this.  After he finished the last one, the cough came right back.       Cough  Associated symptoms include headaches (shooting pain at both temples) and wheezing. Pertinent negatives include no chest pain, fever, postnasal drip or shortness of breath.     Cough for about a mo  Was on Zpak and that did not help  and then on Levaquin for 7 d which helped but cough came back  Stopped Levaquin a week ago    No Hx of Asthma of COPD, PFT was normal in 2019    Review of Systems   Constitutional:  Negative for fever and unexpected weight change.   HENT:  Negative for congestion, postnasal drip, sinus pressure and sinus pain.    Respiratory:  Positive for cough (productive yellow mucus) and wheezing. Negative for shortness of breath.    Cardiovascular:  Negative for chest pain.   Musculoskeletal:  Positive for back pain.   Neurological:  Positive for headaches (shooting pain at both temples).       See HPI.    Past Medical History:   Diagnosis Date   • Adverse effect of anesthesia     slow to wake after lipoma removal 1/2020   • Arrhythmia    • Arthritis    • BPH (benign prostatic hyperplasia) 01/05/2015   • Chronic pain    • Diabetes (HCC)     Type II   • Fatty liver    • GERD (gastroesophageal reflux disease)    • Hiatal hernia    • Hypercholesterolemia    • Hypertension    • Ill-defined condition 2017    lt shoulder lipoma   • Lipoma of back 11/2018    left shoulderblade area   • Rotator cuff tear        Past Surgical History:   Procedure Laterality Date   • CATARACT EXTRACTION Bilateral 12/2022   • CHOLECYSTECTOMY  2001   • CYST REMOVAL  10/10/2017   • HERNIA REPAIR  2001   • MOHS SURGERY     • ORTHOPEDIC SURGERY  2015     lt  shoulder replacement   • OTHER SURGICAL HISTORY  01/08/2020    Excisional biopsy of recurrent

## 2024-03-06 ENCOUNTER — HOSPITAL ENCOUNTER (OUTPATIENT)
Facility: HOSPITAL | Age: 79
Discharge: HOME | End: 2024-03-09
Payer: MEDICARE

## 2024-03-06 DIAGNOSIS — R05.1 ACUTE COUGH: ICD-10-CM

## 2024-03-06 PROCEDURE — 71046 X-RAY EXAM CHEST 2 VIEWS: CPT

## 2024-04-09 NOTE — PROCEDURES
Operative Report-Flexible sigmoidoscopy    Leah Banegas 9/29/1988   [facility] [id number] MRN [id number]   Admission Date 4/5/2024 Operation Date 4/8/2024   Attending Physician Calos Frederick DO Operating Physician Sana Garcia DO     PREOPERATIVE DIAGNOSIS/INDICATION: Hematochezia, Anemia   POSTOPERTATIVE DIAGNOSIS: Internal hemorrhoids  PROCEDURE PERFORMED: Flexible sigmoidoscopy  INFORMED CONSENT:  Once a brief history and physical examination was performed, the risks, benefits and alternatives to the procedure were discussed with the patient and/or family and informed consent was obtained. The risks of sedation, perforation, missed lesions and need for surgery were all discussed.  Patient expressed understanding of the risks and agreed to proceed.    PROCEDURE DESCRIPTION:The patient was then brought to the OR where the patient's pulse, pulse oximetry and blood pressure were monitored. The patient was intubated. The patient was placed in the left lateral decubitus position and deep sedation was administered. Once adequate sedation was achieved, a rectal exam was performed which was normal. A lubricated tip of an Olympus video colonoscope was then inserted through the rectum and gently manipulated under direct visualization to the sigmoid colon. The quality of the preparation was fair. Upon withdrawal of the colonoscope, careful visualization of the mucosa was performed.   [place] Prep Score: Left colon 2  FINDINGS/THERAPEUTICS:    SIGMOID COLON: The underlying mucosa was normal to the extent visualized in the mid sigmoid colon.   RECTUM: Grade II Internal hemorrhoids.  RECOMMENDATIONS:   Post Sigmoidoscopy precautions, watch for bleeding, infection, perforation, adverse drug reactions   Recommend colonoscopy if patient is agreeable.   CC Report:   Sana Garcia DO  4/8/2024  7:05 PM       NAME:  Fritz Ngo. :   1945   MRN:   [id number]     Date/Time:  2021 10:31 AM    Esophagogastroduodenoscopy (EGD) Procedure Note    Procedure: Esophagogastroduodenoscopy with biopsy, esophageal dilation    Indication:  Dysphagia/odynophagia  Pre-operative Diagnosis: see indication above  Post-operative Diagnosis: see findings below  :  Campbell Kat MD  Referring Provider:   Nadir Adams MD    Exam:  Airway: clear, no airway problems anticipated  Heart: RRR, without gallops or rubs  Lungs: clear bilaterally without wheezes, crackles, or rhonchi  Abdomen: soft, nontender, nondistended, bowel sounds present  Mental Status: awake, alert and oriented to person, place and time     Anethesia/Sedation:  MAC anesthesia Propofol 150mg IV  Procedure Details   After informed consent was obtained for the procedure, with all risks and benefits of procedure explained the patient was taken to the endoscopy suite and placed in the left lateral decubitus position. Following sequential administration of sedation as per above, the [id number] gastroscope was inserted into the mouth and advanced under direct vision to second portion of the duodenum. A careful inspection was made as the gastroscope was withdrawn, including a retroflexed view of the proximal stomach; findings and interventions are described below. Findings:    -Normal appearance of esophageal mucosa without stricture, mass, or obstruction; biopsied followed by empiric dilatation with 54FR Savary dilator over wire with endoscopic evaluation afterwards  -Esophageal dyskinesia (dysmotility)  -Small 3cm hiatal hernia from 42-45cm  -Normal stomach mucosa  -Normal duodenal mucosa      Therapies:  esophageal dilation with savary sizes 54FR over wire; biopsy of esophagus  Specimens: #1 G-E jxn  EBL:  None. Complications:   None; patient tolerated the procedure well.            Impression:    -Normal appearance of esophageal mucosa without stricture, mass, or obstruction; biopsied followed by empiric dilatation with 54FR Savary dilator over wire with endoscopic evaluation afterwards  -Esophageal dyskinesia (dysmotility)  -Small 3cm hiatal hernia from 42-45cm  -Normal stomach mucosa  -Normal duodenal mucosa    Recommendations:  -Continue acid suppression. , -Await pathology. , -Follow symptoms.     Discharge disposition:  Home in the company of  when able to ambulate    Keisha Headley MD

## 2024-05-13 ENCOUNTER — OFFICE VISIT (OUTPATIENT)
Dept: FAMILY MEDICINE CLINIC | Age: 79
End: 2024-05-13
Payer: MEDICARE

## 2024-05-13 VITALS
RESPIRATION RATE: 12 BRPM | HEART RATE: 70 BPM | TEMPERATURE: 97.9 F | OXYGEN SATURATION: 96 % | SYSTOLIC BLOOD PRESSURE: 123 MMHG | BODY MASS INDEX: 37.99 KG/M2 | HEIGHT: 65 IN | DIASTOLIC BLOOD PRESSURE: 75 MMHG | WEIGHT: 228 LBS

## 2024-05-13 DIAGNOSIS — E78.1 HIGH TRIGLYCERIDES: Primary | ICD-10-CM

## 2024-05-13 DIAGNOSIS — K21.9 GASTRO-ESOPHAGEAL REFLUX DISEASE WITHOUT ESOPHAGITIS: ICD-10-CM

## 2024-05-13 DIAGNOSIS — N17.9 ACUTE RENAL FAILURE, UNSPECIFIED ACUTE RENAL FAILURE TYPE (HCC): ICD-10-CM

## 2024-05-13 DIAGNOSIS — E11.9 TYPE 2 DIABETES MELLITUS WITHOUT COMPLICATION, WITHOUT LONG-TERM CURRENT USE OF INSULIN (HCC): ICD-10-CM

## 2024-05-13 DIAGNOSIS — I10 ESSENTIAL (PRIMARY) HYPERTENSION: ICD-10-CM

## 2024-05-13 PROBLEM — E11.22 TYPE 2 DIABETES MELLITUS WITH CHRONIC KIDNEY DISEASE (HCC): Status: ACTIVE | Noted: 2024-05-13

## 2024-05-13 PROCEDURE — 3078F DIAST BP <80 MM HG: CPT | Performed by: FAMILY MEDICINE

## 2024-05-13 PROCEDURE — G8417 CALC BMI ABV UP PARAM F/U: HCPCS | Performed by: FAMILY MEDICINE

## 2024-05-13 PROCEDURE — 3074F SYST BP LT 130 MM HG: CPT | Performed by: FAMILY MEDICINE

## 2024-05-13 PROCEDURE — G8427 DOCREV CUR MEDS BY ELIG CLIN: HCPCS | Performed by: FAMILY MEDICINE

## 2024-05-13 PROCEDURE — 1123F ACP DISCUSS/DSCN MKR DOCD: CPT | Performed by: FAMILY MEDICINE

## 2024-05-13 PROCEDURE — 1036F TOBACCO NON-USER: CPT | Performed by: FAMILY MEDICINE

## 2024-05-13 PROCEDURE — 99214 OFFICE O/P EST MOD 30 MIN: CPT | Performed by: FAMILY MEDICINE

## 2024-05-13 RX ORDER — OMEGA-3/DHA/EPA/FISH OIL 60 MG-90MG
500 CAPSULE ORAL 2 TIMES DAILY
COMMUNITY

## 2024-05-13 RX ORDER — HYDROCHLOROTHIAZIDE 25 MG/1
25 TABLET ORAL DAILY
Qty: 90 TABLET | Refills: 1 | Status: SHIPPED | OUTPATIENT
Start: 2024-05-13

## 2024-05-13 RX ORDER — LOSARTAN POTASSIUM 25 MG/1
25 TABLET ORAL 2 TIMES DAILY
Qty: 180 TABLET | Refills: 1 | Status: SHIPPED | OUTPATIENT
Start: 2024-05-13

## 2024-05-13 RX ORDER — ATORVASTATIN CALCIUM 40 MG/1
40 TABLET, FILM COATED ORAL DAILY
Qty: 90 TABLET | Refills: 1 | Status: SHIPPED | OUTPATIENT
Start: 2024-05-13

## 2024-05-13 RX ORDER — FAMOTIDINE 40 MG/1
40 TABLET, FILM COATED ORAL DAILY
Qty: 90 TABLET | Refills: 1 | Status: SHIPPED | OUTPATIENT
Start: 2024-05-13

## 2024-05-13 ASSESSMENT — PATIENT HEALTH QUESTIONNAIRE - PHQ9
SUM OF ALL RESPONSES TO PHQ QUESTIONS 1-9: 0
2. FEELING DOWN, DEPRESSED OR HOPELESS: NOT AT ALL
SUM OF ALL RESPONSES TO PHQ9 QUESTIONS 1 & 2: 0
SUM OF ALL RESPONSES TO PHQ QUESTIONS 1-9: 0
1. LITTLE INTEREST OR PLEASURE IN DOING THINGS: NOT AT ALL

## 2024-05-13 ASSESSMENT — ENCOUNTER SYMPTOMS
COUGH: 0
GASTROINTESTINAL NEGATIVE: 1

## 2024-05-13 NOTE — PROGRESS NOTES
Jabari Alvarado Jr. is a 79 y.o. male who presents to the office today with the following:  No chief complaint on file.      HPI    DM  Last HbA1C 6.8  On Metformin  Watching some foods  No BS checks      HTN  BP is good    Hyperlipidemia  Last Chol good  But Triglycerides still high  Fasting for BW  Watching diet  No formal exercises  Weed eating  On Lipitor 40 and Fish oil    Last Creatinine up  Will recheck      Review of Systems   Constitutional:  Negative for unexpected weight change.   HENT:  Negative for congestion.    Respiratory:  Negative for cough.    Cardiovascular:  Negative for chest pain.   Gastrointestinal: Negative.    Genitourinary: Negative.    Neurological:  Negative for dizziness and headaches.       See HPI.    Past Medical History:   Diagnosis Date    Adverse effect of anesthesia     slow to wake after lipoma removal 1/2020    Arrhythmia     Arthritis     BPH (benign prostatic hyperplasia) 01/05/2015    Chronic pain     Diabetes (HCC)     Type II    Fatty liver     GERD (gastroesophageal reflux disease)     Hiatal hernia     Hypercholesterolemia     Hypertension     Ill-defined condition 2017    lt shoulder lipoma    Lipoma of back 11/2018    left shoulderblade area    Rotator cuff tear        Past Surgical History:   Procedure Laterality Date    CATARACT EXTRACTION Bilateral 12/2022    CHOLECYSTECTOMY  2001    CYST REMOVAL  10/10/2017    HERNIA REPAIR  2001    MOHS SURGERY      ORTHOPEDIC SURGERY  2015     lt  shoulder replacement    OTHER SURGICAL HISTORY  01/08/2020    Excisional biopsy of recurrent intramuscular left upper back mass.    PRE-MALIGNANT / BENIGN SKIN LESION EXCISION  10/10/2017    Excision left shoulder cyst- Maylin Sullivan MD    ROTATOR CUFF REPAIR      rt shoulder    UPPER GI ENDOSCOPY,AGUSTINA W GUIDE  07/06/2021            Allergies   Allergen Reactions    Bupropion Other (See Comments)     Double vision    Lisinopril Cough    Penicillins Hives    Tramadol Nausea And

## 2024-05-13 NOTE — PROGRESS NOTES
Successful venipuncture in patient's r ac X 1 sticks.  The patient tolerated this procedure w/o c/o pain or discomfort.\"Have you been to the ER, urgent care clinic since your last visit?  Hospitalized since your last visit?\"    NO    “Have you seen or consulted any other health care providers outside of [facility] since your last visit?”    NO            Click Here for Release of Records Request

## 2024-05-14 LAB
ALBUMIN SERPL-MCNC: 3.7 G/DL (ref 3.5–5)
ALBUMIN/GLOB SERPL: 1.4 (ref 1.1–2.2)
ALP SERPL-CCNC: 108 U/L (ref 45–117)
ALT SERPL-CCNC: 31 U/L (ref 12–78)
ANION GAP SERPL CALC-SCNC: 8 MMOL/L (ref 5–15)
AST SERPL-CCNC: 21 U/L (ref 15–37)
BASOPHILS # BLD: 0 K/UL (ref 0–0.1)
BASOPHILS NFR BLD: 1 % (ref 0–1)
BILIRUB SERPL-MCNC: 0.6 MG/DL (ref 0.2–1)
BUN SERPL-MCNC: 20 MG/DL (ref 6–20)
BUN/CREAT SERPL: 22 (ref 12–20)
CALCIUM SERPL-MCNC: 9.3 MG/DL (ref 8.5–10.1)
CHLORIDE SERPL-SCNC: 101 MMOL/L (ref 97–108)
CO2 SERPL-SCNC: 33 MMOL/L (ref 21–32)
CREAT SERPL-MCNC: 0.92 MG/DL (ref 0.7–1.3)
DIFFERENTIAL METHOD BLD: ABNORMAL
EOSINOPHIL # BLD: 0.3 K/UL (ref 0–0.4)
EOSINOPHIL NFR BLD: 3 % (ref 0–7)
ERYTHROCYTE [DISTWIDTH] IN BLOOD BY AUTOMATED COUNT: 15.7 % (ref 11.5–14.5)
EST. AVERAGE GLUCOSE BLD GHB EST-MCNC: 126 MG/DL
GLOBULIN SER CALC-MCNC: 2.6 G/DL (ref 2–4)
GLUCOSE SERPL-MCNC: 132 MG/DL (ref 65–100)
HBA1C MFR BLD: 6 % (ref 4–5.6)
HCT VFR BLD AUTO: 37.3 % (ref 36.6–50.3)
HGB BLD-MCNC: 12 G/DL (ref 12.1–17)
IMM GRANULOCYTES # BLD AUTO: 0 K/UL (ref 0–0.04)
IMM GRANULOCYTES NFR BLD AUTO: 0 % (ref 0–0.5)
LYMPHOCYTES # BLD: 1.7 K/UL (ref 0.8–3.5)
LYMPHOCYTES NFR BLD: 20 % (ref 12–49)
MCH RBC QN AUTO: 27.8 PG (ref 26–34)
MCHC RBC AUTO-ENTMCNC: 32.2 G/DL (ref 30–36.5)
MCV RBC AUTO: 86.5 FL (ref 80–99)
MONOCYTES # BLD: 0.5 K/UL (ref 0–1)
MONOCYTES NFR BLD: 6 % (ref 5–13)
NEUTS SEG # BLD: 5.8 K/UL (ref 1.8–8)
NEUTS SEG NFR BLD: 70 % (ref 32–75)
NRBC # BLD: 0 K/UL (ref 0–0.01)
NRBC BLD-RTO: 0 PER 100 WBC
PLATELET # BLD AUTO: 226 K/UL (ref 150–400)
PMV BLD AUTO: 11.6 FL (ref 8.9–12.9)
POTASSIUM SERPL-SCNC: 3.7 MMOL/L (ref 3.5–5.1)
PROT SERPL-MCNC: 6.3 G/DL (ref 6.4–8.2)
RBC # BLD AUTO: 4.31 M/UL (ref 4.1–5.7)
SODIUM SERPL-SCNC: 142 MMOL/L (ref 136–145)
TRIGL SERPL-MCNC: 414 MG/DL
WBC # BLD AUTO: 8.3 K/UL (ref 4.1–11.1)

## 2024-05-14 RX ORDER — FENOFIBRATE 145 MG/1
145 TABLET, COATED ORAL DAILY
Qty: 90 TABLET | Refills: 1 | Status: SHIPPED | OUTPATIENT
Start: 2024-05-14

## 2024-05-28 ENCOUNTER — OFFICE VISIT (OUTPATIENT)
Dept: FAMILY MEDICINE CLINIC | Age: 79
End: 2024-05-28
Payer: MEDICARE

## 2024-05-28 VITALS
BODY MASS INDEX: 34.86 KG/M2 | HEIGHT: 68 IN | TEMPERATURE: 98 F | OXYGEN SATURATION: 94 % | SYSTOLIC BLOOD PRESSURE: 129 MMHG | HEART RATE: 92 BPM | RESPIRATION RATE: 12 BRPM | WEIGHT: 230 LBS | DIASTOLIC BLOOD PRESSURE: 73 MMHG

## 2024-05-28 DIAGNOSIS — E66.09 CLASS 1 OBESITY DUE TO EXCESS CALORIES WITH SERIOUS COMORBIDITY AND BODY MASS INDEX (BMI) OF 34.0 TO 34.9 IN ADULT: ICD-10-CM

## 2024-05-28 DIAGNOSIS — E11.9 TYPE 2 DIABETES MELLITUS WITHOUT COMPLICATION, WITHOUT LONG-TERM CURRENT USE OF INSULIN (HCC): Primary | ICD-10-CM

## 2024-05-28 PROCEDURE — 3044F HG A1C LEVEL LT 7.0%: CPT | Performed by: FAMILY MEDICINE

## 2024-05-28 PROCEDURE — 3078F DIAST BP <80 MM HG: CPT | Performed by: FAMILY MEDICINE

## 2024-05-28 PROCEDURE — G8417 CALC BMI ABV UP PARAM F/U: HCPCS | Performed by: FAMILY MEDICINE

## 2024-05-28 PROCEDURE — G8428 CUR MEDS NOT DOCUMENT: HCPCS | Performed by: FAMILY MEDICINE

## 2024-05-28 PROCEDURE — 99212 OFFICE O/P EST SF 10 MIN: CPT | Performed by: FAMILY MEDICINE

## 2024-05-28 PROCEDURE — 1123F ACP DISCUSS/DSCN MKR DOCD: CPT | Performed by: FAMILY MEDICINE

## 2024-05-28 PROCEDURE — 1036F TOBACCO NON-USER: CPT | Performed by: FAMILY MEDICINE

## 2024-05-28 PROCEDURE — 3074F SYST BP LT 130 MM HG: CPT | Performed by: FAMILY MEDICINE

## 2024-05-28 NOTE — PROGRESS NOTES
Jabari Alvarado Jr. is a 79 y.o. male who presents to the office today with the following:  Chief Complaint   Patient presents with    Discuss Medications     GOLO diet pills       HPI    Obesity    DM  Last HbA1C 6.0  On Metformin 1000 bid    Pt wants to try diet pills GOLO  Cost of 63 dollars a mo  Mixture of herbal supplements and minerals on review  Pt wants to make sure it is ok to try  Can not afford Ozempic even with Insurance covering some of it  Cost is double of the GOLO      Review of Systems    See HPI.    Past Medical History:   Diagnosis Date    Adverse effect of anesthesia     slow to wake after lipoma removal 1/2020    Arrhythmia     Arthritis     BPH (benign prostatic hyperplasia) 01/05/2015    Chronic pain     Diabetes (HCC)     Type II    Fatty liver     GERD (gastroesophageal reflux disease)     Hiatal hernia     Hypercholesterolemia     Hypertension     Ill-defined condition 2017    lt shoulder lipoma    Lipoma of back 11/2018    left shoulderblade area    Rotator cuff tear        Past Surgical History:   Procedure Laterality Date    CATARACT EXTRACTION Bilateral 12/2022    CHOLECYSTECTOMY  2001    CYST REMOVAL  10/10/2017    HERNIA REPAIR  2001    MOHS SURGERY      ORTHOPEDIC SURGERY  2015     lt  shoulder replacement    OTHER SURGICAL HISTORY  01/08/2020    Excisional biopsy of recurrent intramuscular left upper back mass.    PRE-MALIGNANT / BENIGN SKIN LESION EXCISION  10/10/2017    Excision left shoulder cyst- Maylin Sullivan MD    ROTATOR CUFF REPAIR      rt shoulder    UPPER GI ENDOSCOPY,DILATN W GUIDE  07/06/2021            Allergies   Allergen Reactions    Bupropion Other (See Comments)     Double vision    Lisinopril Cough    Penicillins Hives    Tramadol Nausea And Vomiting     Other reaction(s): Nausea and Vomiting       Current Outpatient Medications   Medication Sig Dispense Refill    fenofibrate (TRICOR) 145 MG tablet Take 1 tablet by mouth daily 90 tablet 1    Omega-3 Fatty Acids

## 2024-07-15 ENCOUNTER — TELEPHONE (OUTPATIENT)
Dept: FAMILY MEDICINE CLINIC | Age: 79
End: 2024-07-15

## 2024-07-15 NOTE — TELEPHONE ENCOUNTER
----- Message from Jania Castellon sent at 2024  3:45 PM EDT -----  Regarding: [facility] Referral Request  [facility] Referral Request    Reason for referral request: Specialty Provider  / PT    Specialist/Lab/Test patient is requesting (if known): Physical therapy    Specialist Phone Number (if applicable):    Additional Information: The patient's wife wants to request for a referral for her  to see a physical therapist, they already have a order before but it's already .  --------------------------------------------------------------------------------------------------------------------------    Relationship to Patient: Spouse/Partner /Wife ( Harper )    Call Back Information: OK to leave message on voicemail  Preferred Call Back Number: Phone [phone]

## 2024-07-15 NOTE — TELEPHONE ENCOUNTER
I have called and spoke to this patient.  He verbalizes understanding.   He has an apt on 8/29/24 and tells me that he will discuss Physical Therapy at that visit.

## 2024-08-12 DIAGNOSIS — E11.9 TYPE 2 DIABETES MELLITUS WITHOUT COMPLICATION, WITHOUT LONG-TERM CURRENT USE OF INSULIN (HCC): ICD-10-CM

## 2024-09-03 ENCOUNTER — OFFICE VISIT (OUTPATIENT)
Dept: FAMILY MEDICINE CLINIC | Age: 79
End: 2024-09-03
Payer: MEDICARE

## 2024-09-03 ENCOUNTER — HOSPITAL ENCOUNTER (OUTPATIENT)
Facility: HOSPITAL | Age: 79
Discharge: HOME | End: 2024-09-06
Payer: MEDICARE

## 2024-09-03 VITALS
TEMPERATURE: 98.6 F | SYSTOLIC BLOOD PRESSURE: 135 MMHG | BODY MASS INDEX: 34.71 KG/M2 | OXYGEN SATURATION: 95 % | HEART RATE: 72 BPM | RESPIRATION RATE: 12 BRPM | WEIGHT: 229 LBS | DIASTOLIC BLOOD PRESSURE: 81 MMHG | HEIGHT: 68 IN

## 2024-09-03 DIAGNOSIS — M25.562 CHRONIC PAIN OF LEFT KNEE: ICD-10-CM

## 2024-09-03 DIAGNOSIS — M54.50 CHRONIC MIDLINE LOW BACK PAIN WITHOUT SCIATICA: ICD-10-CM

## 2024-09-03 DIAGNOSIS — G89.29 CHRONIC PAIN OF LEFT KNEE: ICD-10-CM

## 2024-09-03 DIAGNOSIS — G89.29 CHRONIC MIDLINE LOW BACK PAIN WITHOUT SCIATICA: ICD-10-CM

## 2024-09-03 DIAGNOSIS — E78.1 HIGH TRIGLYCERIDES: ICD-10-CM

## 2024-09-03 DIAGNOSIS — U07.1 COVID-19: Primary | ICD-10-CM

## 2024-09-03 PROCEDURE — 3075F SYST BP GE 130 - 139MM HG: CPT | Performed by: FAMILY MEDICINE

## 2024-09-03 PROCEDURE — 73560 X-RAY EXAM OF KNEE 1 OR 2: CPT

## 2024-09-03 PROCEDURE — 36415 COLL VENOUS BLD VENIPUNCTURE: CPT | Performed by: FAMILY MEDICINE

## 2024-09-03 PROCEDURE — 1123F ACP DISCUSS/DSCN MKR DOCD: CPT | Performed by: FAMILY MEDICINE

## 2024-09-03 PROCEDURE — 1036F TOBACCO NON-USER: CPT | Performed by: FAMILY MEDICINE

## 2024-09-03 PROCEDURE — G8427 DOCREV CUR MEDS BY ELIG CLIN: HCPCS | Performed by: FAMILY MEDICINE

## 2024-09-03 PROCEDURE — 3079F DIAST BP 80-89 MM HG: CPT | Performed by: FAMILY MEDICINE

## 2024-09-03 PROCEDURE — 99214 OFFICE O/P EST MOD 30 MIN: CPT | Performed by: FAMILY MEDICINE

## 2024-09-03 PROCEDURE — G8417 CALC BMI ABV UP PARAM F/U: HCPCS | Performed by: FAMILY MEDICINE

## 2024-09-03 SDOH — ECONOMIC STABILITY: INCOME INSECURITY: HOW HARD IS IT FOR YOU TO PAY FOR THE VERY BASICS LIKE FOOD, HOUSING, MEDICAL CARE, AND HEATING?: NOT HARD AT ALL

## 2024-09-03 SDOH — ECONOMIC STABILITY: FOOD INSECURITY: WITHIN THE PAST 12 MONTHS, THE FOOD YOU BOUGHT JUST DIDN'T LAST AND YOU DIDN'T HAVE MONEY TO GET MORE.: NEVER TRUE

## 2024-09-03 SDOH — ECONOMIC STABILITY: FOOD INSECURITY: WITHIN THE PAST 12 MONTHS, YOU WORRIED THAT YOUR FOOD WOULD RUN OUT BEFORE YOU GOT MONEY TO BUY MORE.: NEVER TRUE

## 2024-09-03 ASSESSMENT — ENCOUNTER SYMPTOMS
BACK PAIN: 1
DIARRHEA: 1
SORE THROAT: 1
SHORTNESS OF BREATH: 1
COUGH: 1

## 2024-09-03 ASSESSMENT — PATIENT HEALTH QUESTIONNAIRE - PHQ9
SUM OF ALL RESPONSES TO PHQ QUESTIONS 1-9: 0
SUM OF ALL RESPONSES TO PHQ QUESTIONS 1-9: 0
SUM OF ALL RESPONSES TO PHQ9 QUESTIONS 1 & 2: 0
1. LITTLE INTEREST OR PLEASURE IN DOING THINGS: NOT AT ALL
SUM OF ALL RESPONSES TO PHQ QUESTIONS 1-9: 0
2. FEELING DOWN, DEPRESSED OR HOPELESS: NOT AT ALL
SUM OF ALL RESPONSES TO PHQ QUESTIONS 1-9: 0

## 2024-09-03 NOTE — PROGRESS NOTES
racSuccessful venipuncture in patient's rac X 1 sticks.  The patient tolerated this procedure w/o c/o pain or discomfort.

## 2024-09-03 NOTE — PROGRESS NOTES
Jabari Alvarado Jr. is a 79 y.o. male who presents to the office today with the following:  Chief Complaint   Patient presents with    Diabetes     Refills, follow up.  Has covid - home test       Diabetes  Hypoglycemia symptoms include headaches. Pertinent negatives for diabetes include no chest pain.       Got sick Wed, 6 d ago  Tested pos for Covid 5 d ago  Headaches, cough, no fever  Runny nose and eyes  Taking Mucinex    Had first 2 shots of Covid vaccines    Got Covid from wife    Last TG over 400  Fenofibrate was started  No SE with med  Fasting for BW    Wants disability DMV for left knee pain  Has a hard time getting up  Has to stop after 50 feet to rest  No Xray for years  No use of canes or walkers  Has had knee pain for years but getting worse  No locking  Occ feels like giving way      Never did PT for back  Wants to try that now  Back pain for years  In middle of low back  Had deg disc ds and OA in both hips on Xray 4/23  No GI or  incontinence  No numbness   No falls      Review of Systems   Constitutional:  Negative for fever.   HENT:  Positive for congestion and sore throat.    Respiratory:  Positive for cough and shortness of breath (little). Wheezing: little.   Cardiovascular:  Negative for chest pain.   Gastrointestinal:  Positive for diarrhea.   Musculoskeletal:  Positive for arthralgias and back pain.   Neurological:  Positive for headaches. Negative for numbness.       See HPI.    Past Medical History:   Diagnosis Date    Adverse effect of anesthesia     slow to wake after lipoma removal 1/2020    Arrhythmia     Arthritis     BPH (benign prostatic hyperplasia) 01/05/2015    Chronic pain     Diabetes (HCC)     Type II    Fatty liver     GERD (gastroesophageal reflux disease)     Hiatal hernia     Hypercholesterolemia     Hypertension     Ill-defined condition 2017    lt shoulder lipoma    Lipoma of back 11/2018    left shoulderblade area    Rotator cuff tear        Past Surgical History:

## 2024-09-04 LAB
ALBUMIN SERPL-MCNC: 3.8 G/DL (ref 3.5–5)
ALBUMIN/GLOB SERPL: 1.2 (ref 1.1–2.2)
ALP SERPL-CCNC: 61 U/L (ref 45–117)
ALT SERPL-CCNC: 37 U/L (ref 12–78)
ANION GAP SERPL CALC-SCNC: 7 MMOL/L (ref 5–15)
AST SERPL-CCNC: 17 U/L (ref 15–37)
BILIRUB SERPL-MCNC: 0.4 MG/DL (ref 0.2–1)
BUN SERPL-MCNC: 27 MG/DL (ref 6–20)
BUN/CREAT SERPL: 23 (ref 12–20)
CALCIUM SERPL-MCNC: 9.7 MG/DL (ref 8.5–10.1)
CHLORIDE SERPL-SCNC: 102 MMOL/L (ref 97–108)
CHOLEST SERPL-MCNC: 157 MG/DL
CO2 SERPL-SCNC: 32 MMOL/L (ref 21–32)
CREAT SERPL-MCNC: 1.16 MG/DL (ref 0.7–1.3)
GLOBULIN SER CALC-MCNC: 3.2 G/DL (ref 2–4)
GLUCOSE SERPL-MCNC: 105 MG/DL (ref 65–100)
HDLC SERPL-MCNC: 31 MG/DL
HDLC SERPL: 5.1 (ref 0–5)
LDLC SERPL CALC-MCNC: 59.4 MG/DL (ref 0–100)
POTASSIUM SERPL-SCNC: 3.7 MMOL/L (ref 3.5–5.1)
PROT SERPL-MCNC: 7 G/DL (ref 6.4–8.2)
SODIUM SERPL-SCNC: 141 MMOL/L (ref 136–145)
TRIGL SERPL-MCNC: 333 MG/DL
VLDLC SERPL CALC-MCNC: 66.6 MG/DL

## 2024-09-06 ENCOUNTER — TELEPHONE (OUTPATIENT)
Dept: FAMILY MEDICINE CLINIC | Age: 79
End: 2024-09-06

## 2024-11-02 DIAGNOSIS — E78.1 HIGH TRIGLYCERIDES: ICD-10-CM

## 2024-11-04 RX ORDER — FENOFIBRATE 145 MG/1
145 TABLET, COATED ORAL DAILY
Qty: 90 TABLET | Refills: 1 | Status: SHIPPED | OUTPATIENT
Start: 2024-11-04

## 2024-11-04 NOTE — TELEPHONE ENCOUNTER
Patient requesting refill on     Requested Prescriptions     Pending Prescriptions Disp Refills    fenofibrate (TRICOR) 145 MG tablet [Pharmacy Med Name: FENOFIBRATE 145 MG TABLET] 90 tablet 1     Sig: TAKE 1 TABLET BY MOUTH EVERY DAY        Last OV 9/3/2024

## 2024-11-09 DIAGNOSIS — E11.9 TYPE 2 DIABETES MELLITUS WITHOUT COMPLICATION, WITHOUT LONG-TERM CURRENT USE OF INSULIN (HCC): ICD-10-CM

## 2024-11-09 DIAGNOSIS — R25.2 CRAMP AND SPASM: ICD-10-CM

## 2024-11-11 RX ORDER — BACLOFEN 10 MG/1
TABLET ORAL
Qty: 60 TABLET | Refills: 0 | Status: SHIPPED | OUTPATIENT
Start: 2024-11-11

## 2024-12-05 DIAGNOSIS — R25.2 CRAMP AND SPASM: ICD-10-CM

## 2024-12-05 RX ORDER — BACLOFEN 10 MG/1
TABLET ORAL
Qty: 30 TABLET | Refills: 0 | Status: SHIPPED | OUTPATIENT
Start: 2024-12-05

## 2024-12-05 NOTE — TELEPHONE ENCOUNTER
Patient requesting refill on     Requested Prescriptions     Pending Prescriptions Disp Refills    baclofen (LIORESAL) 10 MG tablet [Pharmacy Med Name: BACLOFEN 10 MG TABLET] 90 tablet 1     Sig: TAKE 1/2 TABLET TWICE A DAY BY MOUTH        Last OV 9/3/2024

## 2024-12-31 ENCOUNTER — OFFICE VISIT (OUTPATIENT)
Dept: FAMILY MEDICINE CLINIC | Age: 79
End: 2024-12-31
Payer: MEDICARE

## 2024-12-31 VITALS
SYSTOLIC BLOOD PRESSURE: 123 MMHG | DIASTOLIC BLOOD PRESSURE: 70 MMHG | HEART RATE: 88 BPM | RESPIRATION RATE: 12 BRPM | WEIGHT: 230 LBS | BODY MASS INDEX: 34.86 KG/M2 | TEMPERATURE: 98 F | HEIGHT: 68 IN | OXYGEN SATURATION: 94 %

## 2024-12-31 DIAGNOSIS — I10 ESSENTIAL (PRIMARY) HYPERTENSION: ICD-10-CM

## 2024-12-31 DIAGNOSIS — E11.9 TYPE 2 DIABETES MELLITUS WITHOUT COMPLICATION, WITHOUT LONG-TERM CURRENT USE OF INSULIN (HCC): ICD-10-CM

## 2024-12-31 DIAGNOSIS — E78.1 HIGH TRIGLYCERIDES: ICD-10-CM

## 2024-12-31 DIAGNOSIS — Z00.00 MEDICARE ANNUAL WELLNESS VISIT, SUBSEQUENT: Primary | ICD-10-CM

## 2024-12-31 DIAGNOSIS — B37.2 MONILIASIS SKIN: ICD-10-CM

## 2024-12-31 DIAGNOSIS — I49.9 IRREGULAR HEART RATE: ICD-10-CM

## 2024-12-31 DIAGNOSIS — R07.9 CHEST PAIN, UNSPECIFIED TYPE: ICD-10-CM

## 2024-12-31 PROCEDURE — 1159F MED LIST DOCD IN RCRD: CPT | Performed by: FAMILY MEDICINE

## 2024-12-31 PROCEDURE — G8417 CALC BMI ABV UP PARAM F/U: HCPCS | Performed by: FAMILY MEDICINE

## 2024-12-31 PROCEDURE — 99214 OFFICE O/P EST MOD 30 MIN: CPT | Performed by: FAMILY MEDICINE

## 2024-12-31 PROCEDURE — G0439 PPPS, SUBSEQ VISIT: HCPCS | Performed by: FAMILY MEDICINE

## 2024-12-31 PROCEDURE — 3078F DIAST BP <80 MM HG: CPT | Performed by: FAMILY MEDICINE

## 2024-12-31 PROCEDURE — 3044F HG A1C LEVEL LT 7.0%: CPT | Performed by: FAMILY MEDICINE

## 2024-12-31 PROCEDURE — G8484 FLU IMMUNIZE NO ADMIN: HCPCS | Performed by: FAMILY MEDICINE

## 2024-12-31 PROCEDURE — 1123F ACP DISCUSS/DSCN MKR DOCD: CPT | Performed by: FAMILY MEDICINE

## 2024-12-31 PROCEDURE — 93005 ELECTROCARDIOGRAM TRACING: CPT | Performed by: FAMILY MEDICINE

## 2024-12-31 PROCEDURE — 3074F SYST BP LT 130 MM HG: CPT | Performed by: FAMILY MEDICINE

## 2024-12-31 PROCEDURE — 36415 COLL VENOUS BLD VENIPUNCTURE: CPT | Performed by: FAMILY MEDICINE

## 2024-12-31 PROCEDURE — 93010 ELECTROCARDIOGRAM REPORT: CPT | Performed by: FAMILY MEDICINE

## 2024-12-31 PROCEDURE — G8427 DOCREV CUR MEDS BY ELIG CLIN: HCPCS | Performed by: FAMILY MEDICINE

## 2024-12-31 PROCEDURE — 1036F TOBACCO NON-USER: CPT | Performed by: FAMILY MEDICINE

## 2024-12-31 RX ORDER — NITROGLYCERIN 0.4 MG/1
0.4 TABLET SUBLINGUAL EVERY 5 MIN PRN
Qty: 25 TABLET | Refills: 3 | Status: SHIPPED | OUTPATIENT
Start: 2024-12-31

## 2024-12-31 RX ORDER — LOSARTAN POTASSIUM 25 MG/1
25 TABLET ORAL 2 TIMES DAILY
Qty: 180 TABLET | Refills: 1 | Status: SHIPPED | OUTPATIENT
Start: 2024-12-31

## 2024-12-31 RX ORDER — ATORVASTATIN CALCIUM 40 MG/1
40 TABLET, FILM COATED ORAL DAILY
Qty: 90 TABLET | Refills: 1 | Status: SHIPPED | OUTPATIENT
Start: 2024-12-31

## 2024-12-31 RX ORDER — HYDROCHLOROTHIAZIDE 25 MG/1
25 TABLET ORAL DAILY
Qty: 90 TABLET | Refills: 1 | Status: SHIPPED | OUTPATIENT
Start: 2024-12-31

## 2024-12-31 RX ORDER — MELOXICAM 15 MG/1
15 TABLET ORAL DAILY
COMMUNITY
Start: 2024-11-11

## 2024-12-31 RX ORDER — CLOTRIMAZOLE 1 %
CREAM (GRAM) TOPICAL
Qty: 28 G | Refills: 0 | Status: SHIPPED | OUTPATIENT
Start: 2024-12-31 | End: 2025-01-07

## 2024-12-31 ASSESSMENT — ENCOUNTER SYMPTOMS
ABDOMINAL PAIN: 0
COUGH: 0
BACK PAIN: 1
VOMITING: 0
WHEEZING: 0
SHORTNESS OF BREATH: 1
NAUSEA: 0

## 2024-12-31 ASSESSMENT — PATIENT HEALTH QUESTIONNAIRE - PHQ9
SUM OF ALL RESPONSES TO PHQ QUESTIONS 1-9: 0
SUM OF ALL RESPONSES TO PHQ QUESTIONS 1-9: 0
2. FEELING DOWN, DEPRESSED OR HOPELESS: NOT AT ALL
SUM OF ALL RESPONSES TO PHQ9 QUESTIONS 1 & 2: 0
1. LITTLE INTEREST OR PLEASURE IN DOING THINGS: NOT AT ALL
SUM OF ALL RESPONSES TO PHQ QUESTIONS 1-9: 0
SUM OF ALL RESPONSES TO PHQ QUESTIONS 1-9: 0

## 2024-12-31 ASSESSMENT — LIFESTYLE VARIABLES: HOW MANY STANDARD DRINKS CONTAINING ALCOHOL DO YOU HAVE ON A TYPICAL DAY: PATIENT DOES NOT DRINK

## 2024-12-31 NOTE — PROGRESS NOTES
Jabari Alvarado Jr. is a 79 y.o. male who presents to the office today with the following:  Chief Complaint   Patient presents with    Medicare AWV         Diabetes     Follow up       HPI  HM reviewed  Pt quit smoking 25 y ago per wife and pt    DM  On Metformin  No BS checks    HTN  BP good   No home checks  On Losartan 25 bid  And HCTZ    High Triglycerides in the past  On Lipitor  Not fasting today    Hx of Tob use but quit 25 y ago    Has done PT for left knee pain  Needs TKR  Had cortisone shot and it helped    Occ CP and SOB with it  No other sy  Comes on anytime from left abdomen and radiating to left chest  Had stress test in 2018 on review    Review of Systems   Constitutional:  Negative for diaphoresis and unexpected weight change.   HENT:  Negative for congestion.    Respiratory:  Positive for shortness of breath (some). Negative for cough and wheezing.    Cardiovascular:  Positive for chest pain. Negative for leg swelling.   Gastrointestinal:  Negative for abdominal pain, nausea and vomiting.   Musculoskeletal:  Positive for arthralgias (shoulder pain) and back pain.   Skin:  Positive for rash (in both axilla).   Neurological:  Negative for dizziness, weakness and numbness.       See HPI.    Past Medical History:   Diagnosis Date    Adverse effect of anesthesia     slow to wake after lipoma removal 1/2020    Arrhythmia     Arthritis     BPH (benign prostatic hyperplasia) 01/05/2015    Chronic pain     Diabetes (HCC)     Type II    Fatty liver     GERD (gastroesophageal reflux disease)     Hiatal hernia     Hypercholesterolemia     Hypertension     Ill-defined condition 2017    lt shoulder lipoma    Lipoma of back 11/2018    left shoulderblade area    Rotator cuff tear        Past Surgical History:   Procedure Laterality Date    CATARACT EXTRACTION Bilateral 12/2022    CHOLECYSTECTOMY  2001    CYST REMOVAL  10/10/2017    HERNIA REPAIR  2001    [facility]      ORTHOPEDIC SURGERY  2015     lt

## 2024-12-31 NOTE — PROGRESS NOTES
Successful venipuncture in patient's RAC X 1 sticks.  The patient tolerated this procedure w/o c/o pain or discomfort.

## 2024-12-31 NOTE — PROGRESS NOTES
Successful venipuncture in patient's RAC X 1 sticks.  The patient tolerated this procedure w/o c/o pain or discomfort.\"Have you been to the ER, urgent care clinic since your last visit?  Hospitalized since your last visit?\"    NO    “Have you seen or consulted any other health care providers outside our system since your last visit?”    NO

## 2024-12-31 NOTE — PATIENT INSTRUCTIONS

## 2025-01-01 LAB
ANION GAP SERPL CALC-SCNC: 8 MMOL/L (ref 2–12)
BASOPHILS # BLD: 0.1 K/UL (ref 0–0.1)
BASOPHILS NFR BLD: 1 % (ref 0–1)
BUN SERPL-MCNC: 31 MG/DL (ref 6–20)
BUN/CREAT SERPL: 22 (ref 12–20)
CALCIUM SERPL-MCNC: 9.5 MG/DL (ref 8.5–10.1)
CHLORIDE SERPL-SCNC: 100 MMOL/L (ref 97–108)
CO2 SERPL-SCNC: 30 MMOL/L (ref 21–32)
CREAT SERPL-MCNC: 1.42 MG/DL (ref 0.7–1.3)
DIFFERENTIAL METHOD BLD: ABNORMAL
EOSINOPHIL # BLD: 0.1 K/UL (ref 0–0.4)
EOSINOPHIL NFR BLD: 2 % (ref 0–7)
ERYTHROCYTE [DISTWIDTH] IN BLOOD BY AUTOMATED COUNT: 15 % (ref 11.5–14.5)
EST. AVERAGE GLUCOSE BLD GHB EST-MCNC: 131 MG/DL
GLUCOSE SERPL-MCNC: 179 MG/DL (ref 65–100)
HBA1C MFR BLD: 6.2 % (ref 4–5.6)
HCT VFR BLD AUTO: 37.3 % (ref 36.6–50.3)
HGB BLD-MCNC: 11.8 G/DL (ref 12.1–17)
IMM GRANULOCYTES # BLD AUTO: 0.1 K/UL (ref 0–0.04)
IMM GRANULOCYTES NFR BLD AUTO: 1 % (ref 0–0.5)
LYMPHOCYTES # BLD: 1.6 K/UL (ref 0.8–3.5)
LYMPHOCYTES NFR BLD: 21 % (ref 12–49)
MCH RBC QN AUTO: 28.1 PG (ref 26–34)
MCHC RBC AUTO-ENTMCNC: 31.6 G/DL (ref 30–36.5)
MCV RBC AUTO: 88.8 FL (ref 80–99)
MONOCYTES # BLD: 0.4 K/UL (ref 0–1)
MONOCYTES NFR BLD: 5 % (ref 5–13)
NEUTS SEG # BLD: 5.7 K/UL (ref 1.8–8)
NEUTS SEG NFR BLD: 70 % (ref 32–75)
NRBC # BLD: 0 K/UL (ref 0–0.01)
NRBC BLD-RTO: 0 PER 100 WBC
PLATELET # BLD AUTO: 275 K/UL (ref 150–400)
PMV BLD AUTO: 11.5 FL (ref 8.9–12.9)
POTASSIUM SERPL-SCNC: 3.6 MMOL/L (ref 3.5–5.1)
RBC # BLD AUTO: 4.2 M/UL (ref 4.1–5.7)
SODIUM SERPL-SCNC: 138 MMOL/L (ref 136–145)
WBC # BLD AUTO: 8 K/UL (ref 4.1–11.1)

## 2025-01-02 ENCOUNTER — TELEPHONE (OUTPATIENT)
Dept: FAMILY MEDICINE CLINIC | Age: 80
End: 2025-01-02

## 2025-01-02 DIAGNOSIS — E11.9 TYPE 2 DIABETES MELLITUS WITHOUT COMPLICATION, WITHOUT LONG-TERM CURRENT USE OF INSULIN (HCC): Primary | ICD-10-CM

## 2025-01-02 NOTE — TELEPHONE ENCOUNTER
Patient unable to leave urine sample at 12.31.24 appt.  Has been notified, and will stop by to leave sample

## 2025-01-03 DIAGNOSIS — E11.9 TYPE 2 DIABETES MELLITUS WITHOUT COMPLICATION, WITHOUT LONG-TERM CURRENT USE OF INSULIN (HCC): ICD-10-CM

## 2025-01-03 LAB
CREAT UR-MCNC: 112 MG/DL
MICROALBUMIN UR-MCNC: 2.74 MG/DL
MICROALBUMIN/CREAT UR-RTO: 24 MG/G (ref 0–30)

## 2025-01-04 DIAGNOSIS — R25.2 CRAMP AND SPASM: ICD-10-CM

## 2025-01-05 RX ORDER — BACLOFEN 10 MG/1
TABLET ORAL
Qty: 90 TABLET | Refills: 0 | Status: SHIPPED | OUTPATIENT
Start: 2025-01-05

## 2025-01-27 ENCOUNTER — TELEPHONE (OUTPATIENT)
Facility: HOSPITAL | Age: 80
End: 2025-01-27

## 2025-01-27 NOTE — TELEPHONE ENCOUNTER
Spoke with pt via phone, discussed prep/education for stress test Wednesday 1/29.  Pt has no questions at this time.

## 2025-01-30 ENCOUNTER — TELEPHONE (OUTPATIENT)
Facility: HOSPITAL | Age: 80
End: 2025-01-30

## 2025-01-30 NOTE — TELEPHONE ENCOUNTER
Spoke with pt via phone, reviewed prep/education for stress test Monday 2/3.  Pt has no questions at this time.

## 2025-02-03 ENCOUNTER — HOSPITAL ENCOUNTER (OUTPATIENT)
Facility: HOSPITAL | Age: 80
Setting detail: RECURRING SERIES
Discharge: HOME | End: 2025-02-06
Attending: FAMILY MEDICINE
Payer: MEDICARE

## 2025-02-03 ENCOUNTER — HOSPITAL ENCOUNTER (OUTPATIENT)
Facility: HOSPITAL | Age: 80
Discharge: HOME | End: 2025-02-05
Attending: FAMILY MEDICINE
Payer: MEDICARE

## 2025-02-03 DIAGNOSIS — R07.9 CHEST PAIN, UNSPECIFIED TYPE: ICD-10-CM

## 2025-02-03 LAB
NUC STRESS EJECTION FRACTION: 62 %
STRESS BASELINE DIAS BP: 87 MMHG
STRESS BASELINE HR: 70 BPM
STRESS BASELINE SYS BP: 140 MMHG
STRESS ESTIMATED WORKLOAD: 1 METS
STRESS EXERCISE DUR MIN: 2 MIN
STRESS EXERCISE DUR SEC: 25 SEC
STRESS PEAK DIAS BP: 62 MMHG
STRESS PEAK SYS BP: 157 MMHG
STRESS PERCENT HR ACHIEVED: 71 %
STRESS POST PEAK HR: 101 BPM
STRESS RATE PRESSURE PRODUCT: NORMAL BPM*MMHG
STRESS STAGE 1 BP: NORMAL MMHG
STRESS STAGE 1 DURATION: 1 MIN:SEC
STRESS STAGE 1 HR: 97 BPM
STRESS STAGE 2 DURATION: 1 MIN:SEC
STRESS STAGE 2 HR: 101 BPM
STRESS STAGE 3 BP: NORMAL MMHG
STRESS STAGE 3 DURATION: NORMAL MIN:SEC
STRESS STAGE 3 HR: 96 BPM
STRESS STAGE RECOVERY 1 BP: NORMAL MMHG
STRESS STAGE RECOVERY 1 DURATION: NORMAL MIN:SEC
STRESS STAGE RECOVERY 1 HR: 95 BPM
STRESS TARGET HR: 142 BPM
TID: 0.99

## 2025-02-03 PROCEDURE — A9500 TC99M SESTAMIBI: HCPCS | Performed by: FAMILY MEDICINE

## 2025-02-03 PROCEDURE — 93017 CV STRESS TEST TRACING ONLY: CPT

## 2025-02-03 PROCEDURE — 93018 CV STRESS TEST I&R ONLY: CPT | Performed by: INTERNAL MEDICINE

## 2025-02-03 PROCEDURE — 6360000002 HC RX W HCPCS: Performed by: FAMILY MEDICINE

## 2025-02-03 PROCEDURE — 93016 CV STRESS TEST SUPVJ ONLY: CPT | Performed by: INTERNAL MEDICINE

## 2025-02-03 PROCEDURE — 3430000000 HC RX DIAGNOSTIC RADIOPHARMACEUTICAL: Performed by: FAMILY MEDICINE

## 2025-02-03 PROCEDURE — 78452 HT MUSCLE IMAGE SPECT MULT: CPT | Performed by: INTERNAL MEDICINE

## 2025-02-03 RX ORDER — REGADENOSON 0.08 MG/ML
0.4 INJECTION, SOLUTION INTRAVENOUS
Status: COMPLETED | OUTPATIENT
Start: 2025-02-03 | End: 2025-02-03

## 2025-02-03 RX ORDER — TETRAKIS(2-METHOXYISOBUTYLISOCYANIDE)COPPER(I) TETRAFLUOROBORATE 1 MG/ML
33 INJECTION, POWDER, LYOPHILIZED, FOR SOLUTION INTRAVENOUS
Status: COMPLETED | OUTPATIENT
Start: 2025-02-03 | End: 2025-02-03

## 2025-02-03 RX ORDER — TETRAKIS(2-METHOXYISOBUTYLISOCYANIDE)COPPER(I) TETRAFLUOROBORATE 1 MG/ML
11 INJECTION, POWDER, LYOPHILIZED, FOR SOLUTION INTRAVENOUS
Status: COMPLETED | OUTPATIENT
Start: 2025-02-03 | End: 2025-02-03

## 2025-02-03 RX ADMIN — TETRAKIS(2-METHOXYISOBUTYLISOCYANIDE)COPPER(I) TETRAFLUOROBORATE 33 MILLICURIE: 1 INJECTION, POWDER, LYOPHILIZED, FOR SOLUTION INTRAVENOUS at 08:42

## 2025-02-03 RX ADMIN — REGADENOSON 0.4 MG: 0.08 INJECTION, SOLUTION INTRAVENOUS at 08:41

## 2025-02-03 RX ADMIN — TETRAKIS(2-METHOXYISOBUTYLISOCYANIDE)COPPER(I) TETRAFLUOROBORATE 11 MILLICURIE: 1 INJECTION, POWDER, LYOPHILIZED, FOR SOLUTION INTRAVENOUS at 07:05

## 2025-03-03 ENCOUNTER — TELEPHONE (OUTPATIENT)
Dept: FAMILY MEDICINE CLINIC | Age: 80
End: 2025-03-03

## 2025-03-03 DIAGNOSIS — M54.50 CHRONIC MIDLINE LOW BACK PAIN WITHOUT SCIATICA: Primary | ICD-10-CM

## 2025-03-03 DIAGNOSIS — G89.29 CHRONIC MIDLINE LOW BACK PAIN WITHOUT SCIATICA: Primary | ICD-10-CM

## 2025-03-03 NOTE — TELEPHONE ENCOUNTER
Pt's wife calls to request that jpt get a referral to a different ortho provider. They also want to get an mri of pt's back.     Pt continues to have back pain even after his physical therapy trial, referral to ortho, who stated there wasn't much they could do.     I offered follow up appointment with you. They declinded stating they would like a nurse to call them to explain.     They state [place], which is difficult. If you would please advise.     Best number for callback is  Wife's new cell phone.

## 2025-03-04 NOTE — TELEPHONE ENCOUNTER
2nd attempt, no answer on patient's #, left message for a return call.  I tried calling the wife's new phone #, rings busy.

## 2025-03-05 NOTE — TELEPHONE ENCOUNTER
I have called and spoke to both this patient and his wife.  They want to have the patient see Dr Marcelo Miramontes in [place] for his back.  They want to make an apt to come in and see Dr Galvez to discuss getting an MRI of his back.  Please call [phone] to speak with them to schedule this apt.

## 2025-03-10 ENCOUNTER — OFFICE VISIT (OUTPATIENT)
Dept: FAMILY MEDICINE CLINIC | Age: 80
End: 2025-03-10
Payer: MEDICARE

## 2025-03-10 VITALS
HEART RATE: 90 BPM | RESPIRATION RATE: 12 BRPM | DIASTOLIC BLOOD PRESSURE: 73 MMHG | BODY MASS INDEX: 34.86 KG/M2 | TEMPERATURE: 97.9 F | HEIGHT: 68 IN | OXYGEN SATURATION: 93 % | WEIGHT: 230 LBS | SYSTOLIC BLOOD PRESSURE: 125 MMHG

## 2025-03-10 DIAGNOSIS — E11.22 TYPE 2 DIABETES MELLITUS WITH STAGE 3 CHRONIC KIDNEY DISEASE, WITHOUT LONG-TERM CURRENT USE OF INSULIN, UNSPECIFIED WHETHER STAGE 3A OR 3B CKD (HCC): ICD-10-CM

## 2025-03-10 DIAGNOSIS — N18.30 TYPE 2 DIABETES MELLITUS WITH STAGE 3 CHRONIC KIDNEY DISEASE, WITHOUT LONG-TERM CURRENT USE OF INSULIN, UNSPECIFIED WHETHER STAGE 3A OR 3B CKD (HCC): ICD-10-CM

## 2025-03-10 DIAGNOSIS — M54.2 CHRONIC NECK AND BACK PAIN: Primary | ICD-10-CM

## 2025-03-10 DIAGNOSIS — M54.9 CHRONIC NECK AND BACK PAIN: Primary | ICD-10-CM

## 2025-03-10 DIAGNOSIS — G89.29 CHRONIC NECK AND BACK PAIN: Primary | ICD-10-CM

## 2025-03-10 PROCEDURE — 1036F TOBACCO NON-USER: CPT | Performed by: FAMILY MEDICINE

## 2025-03-10 PROCEDURE — 1125F AMNT PAIN NOTED PAIN PRSNT: CPT | Performed by: FAMILY MEDICINE

## 2025-03-10 PROCEDURE — 3078F DIAST BP <80 MM HG: CPT | Performed by: FAMILY MEDICINE

## 2025-03-10 PROCEDURE — G8417 CALC BMI ABV UP PARAM F/U: HCPCS | Performed by: FAMILY MEDICINE

## 2025-03-10 PROCEDURE — G8427 DOCREV CUR MEDS BY ELIG CLIN: HCPCS | Performed by: FAMILY MEDICINE

## 2025-03-10 PROCEDURE — 1123F ACP DISCUSS/DSCN MKR DOCD: CPT | Performed by: FAMILY MEDICINE

## 2025-03-10 PROCEDURE — 1159F MED LIST DOCD IN RCRD: CPT | Performed by: FAMILY MEDICINE

## 2025-03-10 PROCEDURE — 99213 OFFICE O/P EST LOW 20 MIN: CPT | Performed by: FAMILY MEDICINE

## 2025-03-10 PROCEDURE — 3074F SYST BP LT 130 MM HG: CPT | Performed by: FAMILY MEDICINE

## 2025-03-10 SDOH — ECONOMIC STABILITY: FOOD INSECURITY: WITHIN THE PAST 12 MONTHS, YOU WORRIED THAT YOUR FOOD WOULD RUN OUT BEFORE YOU GOT MONEY TO BUY MORE.: NEVER TRUE

## 2025-03-10 SDOH — ECONOMIC STABILITY: FOOD INSECURITY: WITHIN THE PAST 12 MONTHS, THE FOOD YOU BOUGHT JUST DIDN'T LAST AND YOU DIDN'T HAVE MONEY TO GET MORE.: NEVER TRUE

## 2025-03-10 ASSESSMENT — ENCOUNTER SYMPTOMS: BACK PAIN: 1

## 2025-03-10 NOTE — PROGRESS NOTES
Jabari Alvarado Jr. is a 79 y.o. male who presents to the office today with the following:  Chief Complaint   Patient presents with    Back Pain     Back and neck pain       Back Pain      Neck and back pain started 2 mo ago  Getting worse  4/10  Aching and occ stabbing, knife like  Worse walking  Better sitting still  Had PT and not helping    Had Xray of L spine in 23  Showing multilevel dis disease    Right hand hurting all the time  At times going numb, comes and goes  Dropping things at times  Right worse than left    Burning in both legs  No numbness or tingling or loss of strength in legs  No bowel or urin problems    Would like to see spine specialist Dr Miramontes in [place]  Wife had surgery with him      Needs form filled out for Ozempic to get it from [email or web address]    Review of Systems   Musculoskeletal:  Positive for back pain.       See HPI.    Past Medical History:   Diagnosis Date    Adverse effect of anesthesia     slow to wake after lipoma removal 1/2020    Arrhythmia     Arthritis     BPH (benign prostatic hyperplasia) 01/05/2015    Chronic pain     Diabetes (HCC)     Type II    Fatty liver     GERD (gastroesophageal reflux disease)     Hiatal hernia     Hypercholesterolemia     Hypertension     Ill-defined condition 2017    lt shoulder lipoma    Lipoma of back 11/2018    left shoulderblade area    Rotator cuff tear        Past Surgical History:   Procedure Laterality Date    CATARACT EXTRACTION Bilateral 12/2022    CHOLECYSTECTOMY  2001    CYST REMOVAL  10/10/2017    HERNIA REPAIR  2001    MOHS SURGERY      ORTHOPEDIC SURGERY  2015     lt  shoulder replacement    OTHER SURGICAL HISTORY  01/08/2020    Excisional biopsy of recurrent intramuscular left upper back mass.    PRE-MALIGNANT / BENIGN SKIN LESION EXCISION  10/10/2017    Excision left shoulder cyst- Maylin Sullivan MD    ROTATOR CUFF REPAIR      rt shoulder    UPPER GI ENDOSCOPY,DILATN W GUIDE  07/06/2021            Allergies   Allergen Reactions

## 2025-03-14 ENCOUNTER — TELEPHONE (OUTPATIENT)
Dept: FAMILY MEDICINE CLINIC | Age: 80
End: 2025-03-14

## 2025-03-14 NOTE — TELEPHONE ENCOUNTER
I called pt's wife and left message on cell phone and home phone about having to resend the financial assistance application on different form.  I am waiting for Dr. Galvez's signature on Monday, and then will refax the form.

## 2025-03-18 NOTE — TELEPHONE ENCOUNTER
I have left messages for her to call. I did get Dr. Galvez's signature on new form and faxed back to company

## 2025-04-15 ENCOUNTER — TRANSCRIBE ORDERS (OUTPATIENT)
Facility: HOSPITAL | Age: 80
End: 2025-04-15

## 2025-04-15 DIAGNOSIS — M54.2 CERVICALGIA: Primary | ICD-10-CM

## 2025-04-15 DIAGNOSIS — M47.816 LUMBAR SPONDYLOSIS: ICD-10-CM

## 2025-04-15 DIAGNOSIS — M47.812 CERVICAL SPONDYLOSIS WITHOUT MYELOPATHY: ICD-10-CM

## 2025-04-15 DIAGNOSIS — M54.50 LUMBAR PAIN: ICD-10-CM

## 2025-04-15 DIAGNOSIS — M50.30 DDD (DEGENERATIVE DISC DISEASE), CERVICAL: ICD-10-CM

## 2025-04-15 DIAGNOSIS — M51.369 DEGENERATION OF INTERVERTEBRAL DISC OF LUMBAR REGION, UNSPECIFIED WHETHER PAIN PRESENT: ICD-10-CM

## 2025-04-15 DIAGNOSIS — M54.2 NECK PAIN: ICD-10-CM

## 2025-04-25 ENCOUNTER — HOSPITAL ENCOUNTER (OUTPATIENT)
Facility: HOSPITAL | Age: 80
Discharge: HOME | End: 2025-04-28
Attending: ORTHOPAEDIC SURGERY
Payer: MEDICARE

## 2025-04-25 DIAGNOSIS — M51.369 DEGENERATION OF INTERVERTEBRAL DISC OF LUMBAR REGION, UNSPECIFIED WHETHER PAIN PRESENT: ICD-10-CM

## 2025-04-25 DIAGNOSIS — M54.2 CERVICALGIA: ICD-10-CM

## 2025-04-25 DIAGNOSIS — M54.2 NECK PAIN: ICD-10-CM

## 2025-04-25 DIAGNOSIS — M47.812 CERVICAL SPONDYLOSIS WITHOUT MYELOPATHY: ICD-10-CM

## 2025-04-25 DIAGNOSIS — M47.816 LUMBAR SPONDYLOSIS: ICD-10-CM

## 2025-04-25 DIAGNOSIS — M50.30 DDD (DEGENERATIVE DISC DISEASE), CERVICAL: ICD-10-CM

## 2025-04-25 DIAGNOSIS — M54.50 LUMBAR PAIN: ICD-10-CM

## 2025-04-25 PROCEDURE — 72148 MRI LUMBAR SPINE W/O DYE: CPT

## 2025-04-25 PROCEDURE — 72141 MRI NECK SPINE W/O DYE: CPT

## 2025-05-01 DIAGNOSIS — E78.1 HIGH TRIGLYCERIDES: ICD-10-CM

## 2025-05-01 RX ORDER — FENOFIBRATE 145 MG/1
145 TABLET, COATED ORAL DAILY
Qty: 90 TABLET | Refills: 0 | Status: SHIPPED | OUTPATIENT
Start: 2025-05-01

## 2025-05-01 NOTE — TELEPHONE ENCOUNTER
Patient requesting refill on     Requested Prescriptions     Pending Prescriptions Disp Refills    fenofibrate (TRICOR) 145 MG tablet [Pharmacy Med Name: FENOFIBRATE 145 MG TABLET] 90 tablet 1     Sig: TAKE 1 TABLET BY MOUTH EVERY DAY        Last OV 3/10/2025

## 2025-05-14 ENCOUNTER — TELEPHONE (OUTPATIENT)
Dept: FAMILY MEDICINE CLINIC | Age: 80
End: 2025-05-14

## 2025-05-14 RX ORDER — PREGABALIN 50 MG/1
50 CAPSULE ORAL 2 TIMES DAILY
COMMUNITY
Start: 2025-05-02

## 2025-05-14 NOTE — TELEPHONE ENCOUNTER
Patient comes into office to  Ozempic.  He and his wife report that he is not taking Pregablin at the lowest dose for his back.  They ask that I enter this into his chart.  I have found the information on the reconcile Meds.  I have entered the information.

## 2025-05-14 NOTE — TELEPHONE ENCOUNTER
We have received a shipment of Ozempic for this patient.  I have call and spoke to him and advised him of this.  He tells me he will come by to pick it up.  I have asked him to sign and date when he picks up for our records.  Patient verbalizes understanding.

## 2025-06-26 DIAGNOSIS — I10 ESSENTIAL (PRIMARY) HYPERTENSION: ICD-10-CM

## 2025-06-26 RX ORDER — LOSARTAN POTASSIUM 25 MG/1
25 TABLET ORAL 2 TIMES DAILY
Qty: 180 TABLET | Refills: 0 | Status: SHIPPED | OUTPATIENT
Start: 2025-06-26

## 2025-06-26 NOTE — TELEPHONE ENCOUNTER
Patient requesting refill on     Requested Prescriptions     Pending Prescriptions Disp Refills    losartan (COZAAR) 25 MG tablet [Pharmacy Med Name: LOSARTAN POTASSIUM 25 MG TAB] 180 tablet 1     Sig: TAKE 1 TABLET BY MOUTH TWICE A DAY        Last OV 3/10/2025

## 2025-07-27 DIAGNOSIS — E78.1 HIGH TRIGLYCERIDES: ICD-10-CM

## 2025-07-27 RX ORDER — FENOFIBRATE 145 MG/1
145 TABLET, FILM COATED ORAL DAILY
Qty: 90 TABLET | Refills: 0 | Status: SHIPPED | OUTPATIENT
Start: 2025-07-27

## 2025-08-04 DIAGNOSIS — E11.9 TYPE 2 DIABETES MELLITUS WITHOUT COMPLICATION, WITHOUT LONG-TERM CURRENT USE OF INSULIN (HCC): ICD-10-CM

## 2025-08-07 ENCOUNTER — OFFICE VISIT (OUTPATIENT)
Dept: FAMILY MEDICINE CLINIC | Age: 80
End: 2025-08-07
Payer: MEDICARE

## 2025-08-07 VITALS
HEART RATE: 82 BPM | RESPIRATION RATE: 12 BRPM | SYSTOLIC BLOOD PRESSURE: 118 MMHG | HEIGHT: 68 IN | TEMPERATURE: 97.5 F | DIASTOLIC BLOOD PRESSURE: 58 MMHG | WEIGHT: 227 LBS | BODY MASS INDEX: 34.4 KG/M2 | OXYGEN SATURATION: 96 %

## 2025-08-07 DIAGNOSIS — E11.9 TYPE 2 DIABETES MELLITUS WITHOUT COMPLICATION, WITHOUT LONG-TERM CURRENT USE OF INSULIN (HCC): Primary | ICD-10-CM

## 2025-08-07 DIAGNOSIS — E66.811 CLASS 1 OBESITY DUE TO EXCESS CALORIES WITH SERIOUS COMORBIDITY AND BODY MASS INDEX (BMI) OF 34.0 TO 34.9 IN ADULT: ICD-10-CM

## 2025-08-07 DIAGNOSIS — I10 ESSENTIAL (PRIMARY) HYPERTENSION: ICD-10-CM

## 2025-08-07 DIAGNOSIS — E78.5 HYPERLIPIDEMIA, UNSPECIFIED HYPERLIPIDEMIA TYPE: ICD-10-CM

## 2025-08-07 DIAGNOSIS — E66.09 CLASS 1 OBESITY DUE TO EXCESS CALORIES WITH SERIOUS COMORBIDITY AND BODY MASS INDEX (BMI) OF 34.0 TO 34.9 IN ADULT: ICD-10-CM

## 2025-08-07 PROCEDURE — 3074F SYST BP LT 130 MM HG: CPT | Performed by: FAMILY MEDICINE

## 2025-08-07 PROCEDURE — 1160F RVW MEDS BY RX/DR IN RCRD: CPT | Performed by: FAMILY MEDICINE

## 2025-08-07 PROCEDURE — 99214 OFFICE O/P EST MOD 30 MIN: CPT | Performed by: FAMILY MEDICINE

## 2025-08-07 PROCEDURE — G8427 DOCREV CUR MEDS BY ELIG CLIN: HCPCS | Performed by: FAMILY MEDICINE

## 2025-08-07 PROCEDURE — 36415 COLL VENOUS BLD VENIPUNCTURE: CPT | Performed by: FAMILY MEDICINE

## 2025-08-07 PROCEDURE — 1126F AMNT PAIN NOTED NONE PRSNT: CPT | Performed by: FAMILY MEDICINE

## 2025-08-07 PROCEDURE — 1036F TOBACCO NON-USER: CPT | Performed by: FAMILY MEDICINE

## 2025-08-07 PROCEDURE — 1123F ACP DISCUSS/DSCN MKR DOCD: CPT | Performed by: FAMILY MEDICINE

## 2025-08-07 PROCEDURE — G8417 CALC BMI ABV UP PARAM F/U: HCPCS | Performed by: FAMILY MEDICINE

## 2025-08-07 PROCEDURE — 1159F MED LIST DOCD IN RCRD: CPT | Performed by: FAMILY MEDICINE

## 2025-08-07 PROCEDURE — 3078F DIAST BP <80 MM HG: CPT | Performed by: FAMILY MEDICINE

## 2025-08-07 ASSESSMENT — PATIENT HEALTH QUESTIONNAIRE - PHQ9
SUM OF ALL RESPONSES TO PHQ QUESTIONS 1-9: 0
1. LITTLE INTEREST OR PLEASURE IN DOING THINGS: NOT AT ALL
SUM OF ALL RESPONSES TO PHQ QUESTIONS 1-9: 0
2. FEELING DOWN, DEPRESSED OR HOPELESS: NOT AT ALL
SUM OF ALL RESPONSES TO PHQ QUESTIONS 1-9: 0
SUM OF ALL RESPONSES TO PHQ QUESTIONS 1-9: 0

## 2025-08-07 ASSESSMENT — ENCOUNTER SYMPTOMS
SHORTNESS OF BREATH: 0
COUGH: 0
GASTROINTESTINAL NEGATIVE: 1

## 2025-08-08 LAB
ALBUMIN SERPL-MCNC: 3.7 G/DL (ref 3.5–5.2)
ALBUMIN/GLOB SERPL: 1.6 (ref 1.1–2.2)
ALP SERPL-CCNC: 49 U/L (ref 40–129)
ALT SERPL-CCNC: 29 U/L (ref 10–50)
ANION GAP SERPL CALC-SCNC: 11 MMOL/L (ref 2–14)
AST SERPL-CCNC: 29 U/L (ref 10–50)
BASOPHILS # BLD: 0.05 K/UL (ref 0–0.1)
BASOPHILS NFR BLD: 0.6 % (ref 0–1)
BILIRUB SERPL-MCNC: 0.3 MG/DL (ref 0–1.2)
BUN SERPL-MCNC: 35 MG/DL (ref 8–23)
BUN/CREAT SERPL: 29 (ref 12–20)
CALCIUM SERPL-MCNC: 9.2 MG/DL (ref 8.8–10.2)
CHLORIDE SERPL-SCNC: 102 MMOL/L (ref 98–107)
CHOLEST SERPL-MCNC: 123 MG/DL (ref 0–200)
CO2 SERPL-SCNC: 29 MMOL/L (ref 20–29)
CREAT SERPL-MCNC: 1.21 MG/DL (ref 0.7–1.2)
DIFFERENTIAL METHOD BLD: ABNORMAL
EOSINOPHIL # BLD: 0.22 K/UL (ref 0–0.4)
EOSINOPHIL NFR BLD: 2.6 % (ref 0–7)
ERYTHROCYTE [DISTWIDTH] IN BLOOD BY AUTOMATED COUNT: 15.1 % (ref 11.5–14.5)
EST. AVERAGE GLUCOSE BLD GHB EST-MCNC: 117 MG/DL
GLOBULIN SER CALC-MCNC: 2.3 G/DL (ref 2–4)
GLUCOSE SERPL-MCNC: 96 MG/DL (ref 65–100)
HBA1C MFR BLD: 5.7 % (ref 4–5.6)
HCT VFR BLD AUTO: 36 % (ref 36.6–50.3)
HDLC SERPL-MCNC: 24 MG/DL (ref 40–60)
HDLC SERPL: 5.1
HGB BLD-MCNC: 11 G/DL (ref 12.1–17)
IMM GRANULOCYTES # BLD AUTO: 0.03 K/UL (ref 0–0.04)
IMM GRANULOCYTES NFR BLD AUTO: 0.4 % (ref 0–0.5)
LDLC SERPL CALC-MCNC: 51 MG/DL
LYMPHOCYTES # BLD: 1.66 K/UL (ref 0.8–3.5)
LYMPHOCYTES NFR BLD: 19.5 % (ref 12–49)
MCH RBC QN AUTO: 27.5 PG (ref 26–34)
MCHC RBC AUTO-ENTMCNC: 30.6 G/DL (ref 30–36.5)
MCV RBC AUTO: 90 FL (ref 80–99)
MONOCYTES # BLD: 0.62 K/UL (ref 0–1)
MONOCYTES NFR BLD: 7.3 % (ref 5–13)
NEUTS SEG # BLD: 5.94 K/UL (ref 1.8–8)
NEUTS SEG NFR BLD: 69.6 % (ref 32–75)
NRBC # BLD: 0 K/UL (ref 0–0.01)
NRBC BLD-RTO: 0 PER 100 WBC
PLATELET # BLD AUTO: 241 K/UL (ref 150–400)
PMV BLD AUTO: 11.7 FL (ref 8.9–12.9)
POTASSIUM SERPL-SCNC: 3.8 MMOL/L (ref 3.5–5.1)
PROT SERPL-MCNC: 6.1 G/DL (ref 6.4–8.3)
RBC # BLD AUTO: 4 M/UL (ref 4.1–5.7)
SODIUM SERPL-SCNC: 142 MMOL/L (ref 136–145)
TRIGL SERPL-MCNC: 238 MG/DL (ref 0–150)
VLDLC SERPL CALC-MCNC: 48 MG/DL
WBC # BLD AUTO: 8.5 K/UL (ref 4.1–11.1)

## 2025-08-13 DIAGNOSIS — I10 ESSENTIAL (PRIMARY) HYPERTENSION: ICD-10-CM

## 2025-08-13 RX ORDER — HYDROCHLOROTHIAZIDE 25 MG/1
25 TABLET ORAL DAILY
Qty: 90 TABLET | Refills: 1 | Status: SHIPPED | OUTPATIENT
Start: 2025-08-13

## 2025-09-02 DIAGNOSIS — E11.9 TYPE 2 DIABETES MELLITUS WITHOUT COMPLICATION, WITHOUT LONG-TERM CURRENT USE OF INSULIN (HCC): ICD-10-CM

## (undated) DEVICE — (D)PREP SKN CHLRAPRP APPL 26ML -- CONVERT TO ITEM 371833

## (undated) DEVICE — DEVON™ KNEE AND BODY STRAP 60" X 3" (1.5 M X 7.6 CM): Brand: DEVON

## (undated) DEVICE — STERILE POLYISOPRENE POWDER-FREE SURGICAL GLOVES: Brand: PROTEXIS

## (undated) DEVICE — Device

## (undated) DEVICE — TUBING, SUCTION, 1/4" X 12', STRAIGHT: Brand: MEDLINE

## (undated) DEVICE — SOLIDIFIER FLD 2OZ 1500CC N DISINF IN BTL DISP SAFESORB

## (undated) DEVICE — SYR 10ML LUER LOK 1/5ML GRAD --

## (undated) DEVICE — (D)SYR 10ML 1/5ML GRAD NSAF -- PKGING CHANGE USE ITEM 338027

## (undated) DEVICE — SOLUTION IV 1000ML 0.9% SOD CHL

## (undated) DEVICE — CATH IV AUTOGRD BC PNK 20GA 25 -- INSYTE

## (undated) DEVICE — HANDLE LT SNAP ON ULT DURABLE LENS FOR TRUMPF ALC DISPOSABLE

## (undated) DEVICE — GOWN,PREVENTION PLUS,XL,ST,24/CS: Brand: MEDLINE

## (undated) DEVICE — SUTURE VCRL SZ 3-0 L27IN ABSRB UD L26MM SH 1/2 CIR J416H

## (undated) DEVICE — INFECTION CONTROL KIT SYS

## (undated) DEVICE — NEEDLE HYPO 22GA L1.5IN BLK S STL HUB POLYPR SHLD REG BVL

## (undated) DEVICE — NEONATAL-ADULT SPO2 SENSOR: Brand: NELLCOR

## (undated) DEVICE — STRAP,POSITIONING,KNEE/BODY,FOAM,4X60": Brand: MEDLINE

## (undated) DEVICE — FORCEPS BX L160CM DIA8MM GRSP DISECT CUP TIP NONLOCKING ROT

## (undated) DEVICE — BNDG ADH FABRIC 2X4IN ST LF --

## (undated) DEVICE — STERILE POLYISOPRENE POWDER-FREE SURGICAL GLOVES WITH EMOLLIENT COATING: Brand: PROTEXIS

## (undated) DEVICE — SHEET, T, LAPAROTOMY, STERILE: Brand: MEDLINE

## (undated) DEVICE — SET ADMIN 16ML TBNG L100IN 2 Y INJ SITE IV PIGGY BK DISP

## (undated) DEVICE — SUTURE VCRL SZ 4-0 L27IN ABSRB UD L19MM PS-2 3/8 CIR PRIM J426H

## (undated) DEVICE — CONTAINER SPEC 20 ML LID NEUT BUFF FORMALIN 10 % POLYPR STS

## (undated) DEVICE — SUTURE MCRYL SZ 4-0 L27IN ABSRB UD L19MM PS-2 1/2 CIR PRIM Y426H

## (undated) DEVICE — BLOCK BITE ENDOSCP AD 21 MM W/ DIL BLU LF DISP

## (undated) DEVICE — LIGHT HANDLE: Brand: DEVON

## (undated) DEVICE — SUTURE ABSORBABLE BRAIDED 3-0 SHB 18 IN UD VICRYL + VCPB864D

## (undated) DEVICE — GOWN,SIRUS,NONRNF,SETINSLV,2XL,18/CS: Brand: MEDLINE

## (undated) DEVICE — TOWEL 4 PLY TISS 19X30 SUE WHT

## (undated) DEVICE — 1200 GUARD II KIT W/5MM TUBE W/O VAC TUBE: Brand: GUARDIAN

## (undated) DEVICE — NEEDLE HYPO 18GA L1.5IN PNK S STL HUB POLYPR SHLD REG BVL

## (undated) DEVICE — SUTURE VCRL SZ 2-0 L27IN ABSRB UD L26MM SH 1/2 CIR J417H

## (undated) DEVICE — GARMENT,MEDLINE,DVT,INT,CALF,MED, GEN2: Brand: MEDLINE

## (undated) DEVICE — BAG SPEC BIOHZRD 10 X 10 IN --

## (undated) DEVICE — SYR 3ML LL TIP 1/10ML GRAD --

## (undated) DEVICE — DERMABOND SKIN ADH 0.7ML -- DERMABOND ADVANCED 12/BX

## (undated) DEVICE — BASIN EMSIS 16OZ GRAPHITE PLAS KID SHP MOLD GRAD FOR ORAL

## (undated) DEVICE — REM POLYHESIVE ADULT PATIENT RETURN ELECTRODE: Brand: VALLEYLAB

## (undated) DEVICE — STRIP,CLOSURE,WOUND,MEDI-STRIP,1/2X4: Brand: MEDLINE

## (undated) DEVICE — SYR ASSEMB INFL BLLN 60ML --

## (undated) DEVICE — ELECTRODE,RADIOTRANSLUCENT,FOAM,5PK: Brand: MEDLINE

## (undated) DEVICE — KENDALL SCD EXPRESS SLEEVES, KNEE LENGTH, MEDIUM: Brand: KENDALL SCD

## (undated) DEVICE — Z DISCONTINUED PER MEDLINE LINE GAS SAMPLING O2/CO2 LNG AD 13 FT NSL W/ TBNG FILTERLINE

## (undated) DEVICE — YANKAUER,TAPERED BULBOUS TIP,W/O VENT: Brand: MEDLINE